# Patient Record
Sex: MALE | Race: BLACK OR AFRICAN AMERICAN | Employment: FULL TIME | URBAN - METROPOLITAN AREA
[De-identification: names, ages, dates, MRNs, and addresses within clinical notes are randomized per-mention and may not be internally consistent; named-entity substitution may affect disease eponyms.]

---

## 2017-05-10 ENCOUNTER — APPOINTMENT (OUTPATIENT)
Dept: GENERAL RADIOLOGY | Age: 52
End: 2017-05-10
Payer: OTHER GOVERNMENT

## 2017-05-10 ENCOUNTER — HOSPITAL ENCOUNTER (EMERGENCY)
Age: 52
Discharge: HOME | End: 2017-05-10
Attending: EMERGENCY MEDICINE
Payer: OTHER GOVERNMENT

## 2017-05-10 VITALS
WEIGHT: 242.44 LBS | TEMPERATURE: 98.9 F | RESPIRATION RATE: 16 BRPM | HEIGHT: 69 IN | SYSTOLIC BLOOD PRESSURE: 151 MMHG | HEART RATE: 89 BPM | DIASTOLIC BLOOD PRESSURE: 86 MMHG | OXYGEN SATURATION: 98 % | BODY MASS INDEX: 35.91 KG/M2

## 2017-05-10 DIAGNOSIS — M23.92 INTERNAL DERANGEMENT OF LEFT KNEE: Primary | ICD-10-CM

## 2017-05-10 LAB
ABSOLUTE EOS #: 0.2 K/UL (ref 0–0.4)
ABSOLUTE LYMPH #: 1.4 K/UL (ref 1–4.8)
ABSOLUTE MONO #: 0.5 K/UL (ref 0.2–0.8)
ANION GAP SERPL CALCULATED.3IONS-SCNC: 15 MMOL/L (ref 9–17)
BASOPHILS # BLD: 0 %
BASOPHILS ABSOLUTE: 0 K/UL (ref 0–0.2)
BUN BLDV-MCNC: 15 MG/DL (ref 6–20)
BUN/CREAT BLD: 12 (ref 9–20)
CALCIUM SERPL-MCNC: 8.9 MG/DL (ref 8.6–10.4)
CHLORIDE BLD-SCNC: 97 MMOL/L (ref 98–107)
CO2: 25 MMOL/L (ref 20–31)
CREAT SERPL-MCNC: 1.24 MG/DL (ref 0.7–1.2)
DIFFERENTIAL TYPE: ABNORMAL
EOSINOPHILS RELATIVE PERCENT: 2 %
GFR AFRICAN AMERICAN: >60 ML/MIN
GFR NON-AFRICAN AMERICAN: >60 ML/MIN
GFR SERPL CREATININE-BSD FRML MDRD: ABNORMAL ML/MIN/{1.73_M2}
GFR SERPL CREATININE-BSD FRML MDRD: ABNORMAL ML/MIN/{1.73_M2}
GLUCOSE BLD-MCNC: 170 MG/DL (ref 70–99)
HCT VFR BLD CALC: 37.6 % (ref 41–53)
HEMOGLOBIN: 12.5 G/DL (ref 13.5–17.5)
LYMPHOCYTES # BLD: 15 %
MCH RBC QN AUTO: 27.6 PG (ref 26–34)
MCHC RBC AUTO-ENTMCNC: 33.1 G/DL (ref 31–37)
MCV RBC AUTO: 83.5 FL (ref 80–100)
MONOCYTES # BLD: 6 %
PDW BLD-RTO: 14.1 % (ref 11.5–14.5)
PLATELET # BLD: 232 K/UL (ref 130–400)
PLATELET ESTIMATE: ABNORMAL
PMV BLD AUTO: ABNORMAL FL (ref 6–12)
POTASSIUM SERPL-SCNC: 4 MMOL/L (ref 3.7–5.3)
RBC # BLD: 4.51 M/UL (ref 4.5–5.9)
RBC # BLD: ABNORMAL 10*6/UL
SEG NEUTROPHILS: 77 %
SEGMENTED NEUTROPHILS ABSOLUTE COUNT: 6.8 K/UL (ref 1.8–7.7)
SODIUM BLD-SCNC: 137 MMOL/L (ref 135–144)
URIC ACID: 6.7 MG/DL (ref 3.4–7)
WBC # BLD: 8.9 K/UL (ref 3.5–11)
WBC # BLD: ABNORMAL 10*3/UL

## 2017-05-10 PROCEDURE — 6370000000 HC RX 637 (ALT 250 FOR IP): Performed by: EMERGENCY MEDICINE

## 2017-05-10 PROCEDURE — 73562 X-RAY EXAM OF KNEE 3: CPT

## 2017-05-10 PROCEDURE — 84550 ASSAY OF BLOOD/URIC ACID: CPT

## 2017-05-10 PROCEDURE — 85025 COMPLETE CBC W/AUTO DIFF WBC: CPT

## 2017-05-10 PROCEDURE — 99283 EMERGENCY DEPT VISIT LOW MDM: CPT

## 2017-05-10 PROCEDURE — 80048 BASIC METABOLIC PNL TOTAL CA: CPT

## 2017-05-10 RX ORDER — HYDROCODONE BITARTRATE AND ACETAMINOPHEN 5; 325 MG/1; MG/1
2 TABLET ORAL ONCE
Status: COMPLETED | OUTPATIENT
Start: 2017-05-10 | End: 2017-05-10

## 2017-05-10 RX ORDER — GLIMEPIRIDE 4 MG/1
8 TABLET ORAL
COMMUNITY

## 2017-05-10 RX ORDER — HYDROCODONE BITARTRATE AND ACETAMINOPHEN 5; 325 MG/1; MG/1
1 TABLET ORAL EVERY 4 HOURS PRN
Qty: 20 TABLET | Refills: 0 | Status: SHIPPED | OUTPATIENT
Start: 2017-05-10 | End: 2019-02-15

## 2017-05-10 RX ADMIN — HYDROCODONE BITARTRATE AND ACETAMINOPHEN 2 TABLET: 5; 325 TABLET ORAL at 20:50

## 2017-05-10 ASSESSMENT — PAIN DESCRIPTION - PAIN TYPE: TYPE: ACUTE PAIN

## 2017-05-10 ASSESSMENT — PAIN SCALES - WONG BAKER: WONGBAKER_NUMERICALRESPONSE: 6

## 2017-05-10 ASSESSMENT — PAIN SCALES - GENERAL
PAINLEVEL_OUTOF10: 10
PAINLEVEL_OUTOF10: 10

## 2017-05-10 ASSESSMENT — PAIN DESCRIPTION - DESCRIPTORS: DESCRIPTORS: THROBBING;CONSTANT

## 2017-05-10 ASSESSMENT — PAIN DESCRIPTION - FREQUENCY: FREQUENCY: CONTINUOUS

## 2017-05-10 ASSESSMENT — PAIN DESCRIPTION - ORIENTATION: ORIENTATION: LEFT

## 2017-05-10 ASSESSMENT — PAIN DESCRIPTION - LOCATION: LOCATION: KNEE

## 2017-12-12 ENCOUNTER — HOSPITAL ENCOUNTER (EMERGENCY)
Age: 52
Discharge: HOME | End: 2017-12-12
Attending: EMERGENCY MEDICINE
Payer: OTHER GOVERNMENT

## 2017-12-12 ENCOUNTER — APPOINTMENT (OUTPATIENT)
Dept: CT IMAGING | Age: 52
End: 2017-12-12
Payer: OTHER GOVERNMENT

## 2017-12-12 VITALS
TEMPERATURE: 98.4 F | WEIGHT: 232 LBS | HEART RATE: 77 BPM | HEIGHT: 69 IN | OXYGEN SATURATION: 95 % | RESPIRATION RATE: 16 BRPM | DIASTOLIC BLOOD PRESSURE: 66 MMHG | BODY MASS INDEX: 34.36 KG/M2 | SYSTOLIC BLOOD PRESSURE: 141 MMHG

## 2017-12-12 DIAGNOSIS — I10 ESSENTIAL HYPERTENSION: ICD-10-CM

## 2017-12-12 DIAGNOSIS — R51.9 NONINTRACTABLE HEADACHE, UNSPECIFIED CHRONICITY PATTERN, UNSPECIFIED HEADACHE TYPE: Primary | ICD-10-CM

## 2017-12-12 LAB
ABSOLUTE EOS #: 0.2 K/UL (ref 0–0.4)
ABSOLUTE IMMATURE GRANULOCYTE: ABNORMAL K/UL (ref 0–0.3)
ABSOLUTE LYMPH #: 1.8 K/UL (ref 1–4.8)
ABSOLUTE MONO #: 0.5 K/UL (ref 0.2–0.8)
AMPHETAMINE SCREEN URINE: NEGATIVE
ANION GAP SERPL CALCULATED.3IONS-SCNC: 13 MMOL/L (ref 9–17)
BARBITURATE SCREEN URINE: NEGATIVE
BASOPHILS # BLD: 1 % (ref 0–2)
BASOPHILS ABSOLUTE: 0 K/UL (ref 0–0.2)
BENZODIAZEPINE SCREEN, URINE: NEGATIVE
BUN BLDV-MCNC: 20 MG/DL (ref 6–20)
BUN/CREAT BLD: 15 (ref 9–20)
BUPRENORPHINE URINE: NORMAL
CALCIUM SERPL-MCNC: 8.9 MG/DL (ref 8.6–10.4)
CANNABINOID SCREEN URINE: NEGATIVE
CHLORIDE BLD-SCNC: 97 MMOL/L (ref 98–107)
CO2: 26 MMOL/L (ref 20–31)
COCAINE METABOLITE, URINE: NEGATIVE
CREAT SERPL-MCNC: 1.37 MG/DL (ref 0.7–1.2)
DIFFERENTIAL TYPE: ABNORMAL
EOSINOPHILS RELATIVE PERCENT: 2 % (ref 1–4)
GFR AFRICAN AMERICAN: >60 ML/MIN
GFR NON-AFRICAN AMERICAN: 55 ML/MIN
GFR SERPL CREATININE-BSD FRML MDRD: ABNORMAL ML/MIN/{1.73_M2}
GFR SERPL CREATININE-BSD FRML MDRD: ABNORMAL ML/MIN/{1.73_M2}
GLUCOSE BLD-MCNC: 171 MG/DL (ref 70–99)
HCT VFR BLD CALC: 36.4 % (ref 41–53)
HEMOGLOBIN: 12.3 G/DL (ref 13.5–17.5)
IMMATURE GRANULOCYTES: ABNORMAL %
LYMPHOCYTES # BLD: 21 % (ref 24–44)
MCH RBC QN AUTO: 28.7 PG (ref 26–34)
MCHC RBC AUTO-ENTMCNC: 33.8 G/DL (ref 31–37)
MCV RBC AUTO: 84.8 FL (ref 80–100)
MDMA URINE: NORMAL
METHADONE SCREEN, URINE: NEGATIVE
METHAMPHETAMINE, URINE: NORMAL
MONOCYTES # BLD: 6 % (ref 1–7)
MYOGLOBIN: 134 NG/ML (ref 28–72)
OPIATES, URINE: NEGATIVE
OXYCODONE SCREEN URINE: NEGATIVE
PDW BLD-RTO: 14.2 % (ref 11.5–14.5)
PHENCYCLIDINE, URINE: NEGATIVE
PLATELET # BLD: 243 K/UL (ref 130–400)
PLATELET ESTIMATE: ABNORMAL
PMV BLD AUTO: 7.5 FL (ref 6–12)
POTASSIUM SERPL-SCNC: 3.9 MMOL/L (ref 3.7–5.3)
PROPOXYPHENE, URINE: NORMAL
RBC # BLD: 4.29 M/UL (ref 4.5–5.9)
RBC # BLD: ABNORMAL 10*6/UL
SEG NEUTROPHILS: 70 % (ref 36–66)
SEGMENTED NEUTROPHILS ABSOLUTE COUNT: 5.8 K/UL (ref 1.8–7.7)
SODIUM BLD-SCNC: 136 MMOL/L (ref 135–144)
TEST INFORMATION: NORMAL
TRICYCLIC ANTIDEPRESSANTS, UR: NORMAL
TROPONIN INTERP: ABNORMAL
TROPONIN T: <0.03 NG/ML
WBC # BLD: 8.3 K/UL (ref 3.5–11)
WBC # BLD: ABNORMAL 10*3/UL

## 2017-12-12 PROCEDURE — 80307 DRUG TEST PRSMV CHEM ANLYZR: CPT

## 2017-12-12 PROCEDURE — 85025 COMPLETE CBC W/AUTO DIFF WBC: CPT

## 2017-12-12 PROCEDURE — 80048 BASIC METABOLIC PNL TOTAL CA: CPT

## 2017-12-12 PROCEDURE — 2580000003 HC RX 258: Performed by: NURSE PRACTITIONER

## 2017-12-12 PROCEDURE — 36415 COLL VENOUS BLD VENIPUNCTURE: CPT

## 2017-12-12 PROCEDURE — 6360000002 HC RX W HCPCS: Performed by: NURSE PRACTITIONER

## 2017-12-12 PROCEDURE — 84484 ASSAY OF TROPONIN QUANT: CPT

## 2017-12-12 PROCEDURE — 6360000004 HC RX CONTRAST MEDICATION: Performed by: NURSE PRACTITIONER

## 2017-12-12 PROCEDURE — 70496 CT ANGIOGRAPHY HEAD: CPT

## 2017-12-12 PROCEDURE — 96374 THER/PROPH/DIAG INJ IV PUSH: CPT

## 2017-12-12 PROCEDURE — 96375 TX/PRO/DX INJ NEW DRUG ADDON: CPT

## 2017-12-12 PROCEDURE — 99284 EMERGENCY DEPT VISIT MOD MDM: CPT

## 2017-12-12 PROCEDURE — 83874 ASSAY OF MYOGLOBIN: CPT

## 2017-12-12 PROCEDURE — 70496 CT ANGIOGRAPHY HEAD: CPT | Performed by: EMERGENCY MEDICINE

## 2017-12-12 RX ORDER — 0.9 % SODIUM CHLORIDE 0.9 %
50 INTRAVENOUS SOLUTION INTRAVENOUS ONCE
Status: COMPLETED | OUTPATIENT
Start: 2017-12-12 | End: 2017-12-12

## 2017-12-12 RX ORDER — ONDANSETRON 2 MG/ML
4 INJECTION INTRAMUSCULAR; INTRAVENOUS ONCE
Status: COMPLETED | OUTPATIENT
Start: 2017-12-12 | End: 2017-12-12

## 2017-12-12 RX ORDER — NALBUPHINE HCL 10 MG/ML
10 AMPUL (ML) INJECTION ONCE
Status: COMPLETED | OUTPATIENT
Start: 2017-12-12 | End: 2017-12-12

## 2017-12-12 RX ORDER — SODIUM CHLORIDE 0.9 % (FLUSH) 0.9 %
10 SYRINGE (ML) INJECTION PRN
Status: DISCONTINUED | OUTPATIENT
Start: 2017-12-12 | End: 2017-12-13 | Stop reason: HOSPADM

## 2017-12-12 RX ORDER — BUTALBITAL, ASPIRIN, AND CAFFEINE 50; 325; 40 MG/1; MG/1; MG/1
1 CAPSULE ORAL EVERY 6 HOURS PRN
Qty: 20 CAPSULE | Refills: 0 | Status: SHIPPED | OUTPATIENT
Start: 2017-12-12 | End: 2018-01-03

## 2017-12-12 RX ORDER — KETOROLAC TROMETHAMINE 30 MG/ML
30 INJECTION, SOLUTION INTRAMUSCULAR; INTRAVENOUS ONCE
Status: COMPLETED | OUTPATIENT
Start: 2017-12-12 | End: 2017-12-12

## 2017-12-12 RX ORDER — GLIPIZIDE 10 MG/1
10 TABLET ORAL DAILY
Status: ON HOLD | COMMUNITY
End: 2020-03-01

## 2017-12-12 RX ORDER — HYDRALAZINE HYDROCHLORIDE 20 MG/ML
10 INJECTION INTRAMUSCULAR; INTRAVENOUS ONCE
Status: COMPLETED | OUTPATIENT
Start: 2017-12-12 | End: 2017-12-12

## 2017-12-12 RX ADMIN — HYDRALAZINE HYDROCHLORIDE 10 MG: 20 INJECTION INTRAMUSCULAR; INTRAVENOUS at 20:01

## 2017-12-12 RX ADMIN — ONDANSETRON 4 MG: 2 INJECTION INTRAMUSCULAR; INTRAVENOUS at 20:14

## 2017-12-12 RX ADMIN — IOPAMIDOL 80 ML: 755 INJECTION, SOLUTION INTRAVENOUS at 19:32

## 2017-12-12 RX ADMIN — SODIUM CHLORIDE 50 ML: 9 INJECTION, SOLUTION INTRAVENOUS at 19:32

## 2017-12-12 RX ADMIN — NALBUPHINE HYDROCHLORIDE 10 MG: 10 INJECTION, SOLUTION INTRAMUSCULAR; INTRAVENOUS; SUBCUTANEOUS at 21:07

## 2017-12-12 RX ADMIN — KETOROLAC TROMETHAMINE 30 MG: 30 INJECTION, SOLUTION INTRAMUSCULAR at 20:14

## 2017-12-12 RX ADMIN — Medication 10 ML: at 19:32

## 2017-12-12 ASSESSMENT — PAIN DESCRIPTION - PAIN TYPE: TYPE: ACUTE PAIN

## 2017-12-12 ASSESSMENT — PAIN DESCRIPTION - LOCATION
LOCATION: HEAD
LOCATION: HEAD

## 2017-12-12 ASSESSMENT — PAIN SCALES - GENERAL
PAINLEVEL_OUTOF10: 8
PAINLEVEL_OUTOF10: 5

## 2017-12-12 ASSESSMENT — PAIN DESCRIPTION - DESCRIPTORS: DESCRIPTORS: ACHING;POUNDING

## 2017-12-13 ASSESSMENT — ENCOUNTER SYMPTOMS
RHINORRHEA: 0
ABDOMINAL PAIN: 0
SHORTNESS OF BREATH: 0
VOMITING: 0
COUGH: 0
WHEEZING: 0
DIARRHEA: 0
SINUS PRESSURE: 0
COLOR CHANGE: 0
NAUSEA: 0
CONSTIPATION: 0
SORE THROAT: 0

## 2017-12-13 NOTE — ED PROVIDER NOTES
Attending Supervisory Note/Shared Visit   I have personally performed a face to face diagnostic evaluation on this patient. I have reviewed the mid-levels findings and agree. History and Exam by me shows Vascular headache.   No signs of subarachnoid hemorrhage and was to follow-up with neurology    (Please note that portions of this note were completed with a voice recognition program.  Efforts were made to edit the dictations but occasionally words are mis-transcribed.)    Manan Hung MD  Attending Emergency Physician        Manan Hung MD  12/12/17 6101

## 2017-12-13 NOTE — ED PROVIDER NOTES
[facility] ED  eMERGENCY dEPARTMENT eNCOUnter      Pt Name: Candance Hakim  MRN: [id number]  [place] 1965  Date of evaluation: 12/12/2017  Provider: Beatriz Bonner NP, Simone 6626       Chief Complaint   Patient presents with    Headache     past 9 days / tx at [facility] ED 3 days ago         HISTORY OF PRESENT ILLNESS  (Location/Symptom, Timing/Onset, Context/Setting, Quality, Duration, Modifying Factors, Severity.)   Hayes Lawrence is a 47 y.o. male who presents to the emergency department Today by private vehicle for evaluation of a headache. Patient states that he has a throbbing generalized headache that he rates a 5 out of 0-10 scale. He states he feels that his head is going to explode. He also noted to have some high blood pressure. He does take 10 mg of lisinopril daily. He was seen at [facility] 3 days ago for the same complaint. They gave him some medications to bring his blood pressure down and help with his headache and then send him home. He states he still has the headache and high blood pressure. He attempted to get an appointment with his primary care physician at the [place] but he was unable to do so. He is experiencing any nausea or vomiting. Nursing Notes were reviewed. ALLERGIES     Review of patient's allergies indicates no known allergies.     CURRENT MEDICATIONS       Discharge Medication List as of 12/12/2017 10:20 PM      CONTINUE these medications which have NOT CHANGED    Details   glipiZIDE (GLUCOTROL) 10 MG tablet Take 10 mg by mouth dailyHistorical Med      glimepiride (AMARYL) 1 MG tablet Take 1 mg by mouth every morning (before breakfast)Historical Med      HYDROcodone-acetaminophen (NORCO) 5-325 MG per tablet Take 1 tablet by mouth every 4 hours as needed for Pain ., Disp-20 tablet, R-0Print      cephALEXin (KEFLEX) 500 MG capsule Take 1 capsule by mouth 3 times daily, Disp-21 capsule, R-0      ibuprofen (ADVIL;MOTRIN) 600 MG tablet Take 1 tablet by mouth every 8 hours as needed for Pain, Disp-30 tablet, R-0      nystatin (MYCOSTATIN) 571051 UNIT/GM powder Apply to left groin BID, Disp-1 Bottle, R-1, Print      insulin glargine (LANTUS) 100 UNIT/ML injection vial Inject 15 Units into the skin 2 times daily. PAST MEDICAL HISTORY         Diagnosis Date    Diabetes mellitus ([address]        SURGICAL HISTORY           Procedure Laterality Date    ROTATOR CUFF REPAIR      ROTATOR CUFF REPAIR Right          FAMILY HISTORY     History reviewed. No pertinent family history. No family status information on file. SOCIAL HISTORY      reports that he has never smoked. He does not have any smokeless tobacco history on file. He reports that he drinks alcohol. He reports that he does not use drugs. REVIEW OF SYSTEMS    (2-9 systems for level 4, 10 or more for level 5)     Review of Systems   Constitutional: Negative for chills, fever and unexpected weight change. HENT: Negative for congestion, rhinorrhea, sinus pressure and sore throat. Respiratory: Negative for cough, shortness of breath and wheezing. Cardiovascular: Negative for chest pain and palpitations. Gastrointestinal: Negative for abdominal pain, constipation, diarrhea, nausea and vomiting. Genitourinary: Negative for dysuria and hematuria. Musculoskeletal: Negative for arthralgias and myalgias. Skin: Negative for color change and rash. Neurological: Positive for headaches. Negative for dizziness and weakness. Hematological: Negative for adenopathy. Except as noted above the remainder of the review of systems was reviewed and negative. PHYSICAL EXAM    (up to 7 for level 4, 8 or more for level 5)     ED Triage Vitals [12/12/17 1730]   BP Temp Temp Source Pulse Resp SpO2 Height Weight   (!) 155/105 98.4 °F (36.9 °C) Oral 86 16 99 % 5' 9\" (1.753 m) 232 lb (105.2 kg)       Physical Exam   Constitutional: He is oriented to person, place, and time.  He appears well-developed and well-nourished. HENT:   Head: Normocephalic and atraumatic. Mouth/Throat: Oropharynx is clear and moist.   Eyes: Conjunctivae are normal. Pupils are equal, round, and reactive to light. Neck: Normal range of motion. Neck supple. Cardiovascular: Normal rate and regular rhythm. Pulmonary/Chest: Effort normal and breath sounds normal. No stridor. No respiratory distress. Abdominal: Soft. Bowel sounds are normal.   Musculoskeletal: Normal range of motion. Lymphadenopathy:     He has no cervical adenopathy. Neurological: He is alert and oriented to person, place, and time. Skin: Skin is warm and dry. No rash noted. Psychiatric: He has a normal mood and affect. Vitals reviewed. RADIOLOGY:   Non-plain film images such as CT, Ultrasound and MRI are read by the radiologist. Odalys Healy radiographic images are visualized and preliminarily interpreted by the emergency physician with the below findings:    Cta Head W Contrast    Result Date: 12/12/2017  EXAMINATION: CTA OF THE HEAD WITH CONTRAST  12/12/2017 7:38 pm: TECHNIQUE: CTA of the head/brain was performed with the administration of intravenous contrast. Multiplanar reformatted images are provided for review. MIP images are provided for review. Dose modulation, iterative reconstruction, and/or weight based adjustment of the mA/kV was utilized to reduce the radiation dose to as low as reasonably achievable. COMPARISON: Unenhanced head CT 04/17/2015 HISTORY: ORDERING SYSTEM PROVIDED HISTORY: headache FINDINGS: ANTERIOR CIRCULATION: The internal carotid arteries are normal in course and caliber without focal stenosis. The anterior cerebral and middle cerebral arteries demonstrate no focal stenosis. POSTERIOR CIRCULATION: The posterior cerebral arteries demonstrate no focal stenosis. The vertebral and basilar arteries appear unremarkable. ANEURYSM: No intracranial aneurysm is seen.  BRAIN: No abnormal intracranial enhancement or vascular malformation. Paranasal sinuses are well aerated. Normal structures are normal.  No acute osseous abnormality is seen. Presence of intravascular contrast precludes evaluation for small amounts of acute subarachnoid hemorrhage. Normal CTA of the head. Interpretation per the Radiologist below, if available at the time of this note:    CTA HEAD W CONTRAST   Final Result   Normal CTA of the head. LABS:  Labs Reviewed   CBC WITH AUTO DIFFERENTIAL - Abnormal; Notable for the following:        Result Value    RBC 4.29 (*)     Hemoglobin 12.3 (*)     Hematocrit 36.4 (*)     Seg Neutrophils 70 (*)     Lymphocytes 21 (*)     All other components within normal limits   BASIC METABOLIC PANEL - Abnormal; Notable for the following:     Glucose 171 (*)     CREATININE 1.37 (*)     Chloride 97 (*)     GFR Non- 55 (*)     All other components within normal limits   TROP/MYOGLOBIN - Abnormal; Notable for the following:     Myoglobin 134 (*)     All other components within normal limits   URINE DRUG SCREEN       All other labs were within normal range or not returned as of this dictation. EMERGENCY DEPARTMENT COURSE and DIFFERENTIAL DIAGNOSIS/MDM:   Vitals:    Vitals:    12/12/17 1730 12/12/17 1938 12/12/17 2055 12/12/17 2128   BP: (!) 155/105 (!) 176/90 (!) 147/69 (!) 141/66   Pulse: 86   77   Resp: 16   16   Temp: 98.4 °F (36.9 °C)      TempSrc: Oral      SpO2: 99%   95%   Weight: 232 lb (105.2 kg)      Height: 5' 9\" (1.753 m)          Medical Decision Making: Patient is given Toradol, Zofran, and Nubain. His headache has improved drastically. His blood pressure is now stable 147/69. He was told he needs a follow-up with neurology if these headaches persists as further outpatient testing such as MRI and other tests may be needed, return for worsening headache, fever, vomiting or any other concerns.   Medications   0.9 % sodium chloride bolus (0 mLs Intravenous Stopped 12/12/17 1933)   iopamidol (ISOVUE-370) 76 % injection 80 mL (80 mLs Intravenous Given 12/12/17 1932)   hydrALAZINE (APRESOLINE) injection 10 mg (10 mg Intravenous Given 12/12/17 2001)   ketorolac (TORADOL) injection 30 mg (30 mg Intravenous Given 12/12/17 2014)   ondansetron (ZOFRAN) injection 4 mg (4 mg Intravenous Given 12/12/17 2014)   nalbuphine (NUBAIN) injection 10 mg (10 mg Intravenous Given 12/12/17 2107)       FINAL IMPRESSION      1. Nonintractable headache, unspecified chronicity pattern, unspecified headache type    2.  Essential hypertension          DISPOSITION/PLAN   DISPOSITION Decision to Discharge    PATIENT REFERRED TO:   Sofia Smith MD  . [address]  [phone]    Call in 2 days        DISCHARGE MEDICATIONS:     Discharge Medication List as of 12/12/2017 10:20 PM      START taking these medications    Details   butalbital-aspirin-caffeine (FIORINAL) -40 MG per capsule Take 1 capsule by mouth every 6 hours as needed for Headaches ., Disp-20 capsule, R-0Print                 (Please note that portions of this note were completed with a voice recognition program.  Efforts were made to edit the dictations but occasionally words are mis-transcribed.)    Lynn Bingham NP, CNP  Certified Nurse Practitioner            Dillon Deleon, [address] [facility]  12/13/17 1696

## 2018-01-03 ENCOUNTER — HOSPITAL ENCOUNTER (EMERGENCY)
Age: 53
Discharge: HOME | End: 2018-01-03
Attending: EMERGENCY MEDICINE
Payer: OTHER GOVERNMENT

## 2018-01-03 VITALS
OXYGEN SATURATION: 98 % | DIASTOLIC BLOOD PRESSURE: 98 MMHG | SYSTOLIC BLOOD PRESSURE: 178 MMHG | BODY MASS INDEX: 34.36 KG/M2 | TEMPERATURE: 98.9 F | HEIGHT: 69 IN | RESPIRATION RATE: 18 BRPM | WEIGHT: 232 LBS | HEART RATE: 84 BPM

## 2018-01-03 DIAGNOSIS — R51.9 NONINTRACTABLE HEADACHE, UNSPECIFIED CHRONICITY PATTERN, UNSPECIFIED HEADACHE TYPE: ICD-10-CM

## 2018-01-03 DIAGNOSIS — I10 ESSENTIAL HYPERTENSION: Primary | ICD-10-CM

## 2018-01-03 LAB
ABSOLUTE EOS #: 0.2 K/UL (ref 0–0.4)
ABSOLUTE IMMATURE GRANULOCYTE: ABNORMAL K/UL (ref 0–0.3)
ABSOLUTE LYMPH #: 1.8 K/UL (ref 1–4.8)
ABSOLUTE MONO #: 0.5 K/UL (ref 0.2–0.8)
ANION GAP SERPL CALCULATED.3IONS-SCNC: 14 MMOL/L (ref 9–17)
BASOPHILS # BLD: 1 % (ref 0–2)
BASOPHILS ABSOLUTE: 0 K/UL (ref 0–0.2)
BUN BLDV-MCNC: 20 MG/DL (ref 6–20)
BUN/CREAT BLD: 14 (ref 9–20)
CALCIUM SERPL-MCNC: 8.9 MG/DL (ref 8.6–10.4)
CHLORIDE BLD-SCNC: 95 MMOL/L (ref 98–107)
CO2: 25 MMOL/L (ref 20–31)
CREAT SERPL-MCNC: 1.44 MG/DL (ref 0.7–1.2)
DIFFERENTIAL TYPE: ABNORMAL
EOSINOPHILS RELATIVE PERCENT: 3 % (ref 1–4)
GFR AFRICAN AMERICAN: >60 ML/MIN
GFR NON-AFRICAN AMERICAN: 52 ML/MIN
GFR SERPL CREATININE-BSD FRML MDRD: ABNORMAL ML/MIN/{1.73_M2}
GFR SERPL CREATININE-BSD FRML MDRD: ABNORMAL ML/MIN/{1.73_M2}
GLUCOSE BLD-MCNC: 188 MG/DL (ref 70–99)
HCT VFR BLD CALC: 37.3 % (ref 41–53)
HEMOGLOBIN: 12.8 G/DL (ref 13.5–17.5)
IMMATURE GRANULOCYTES: ABNORMAL %
LYMPHOCYTES # BLD: 21 % (ref 24–44)
MCH RBC QN AUTO: 29.2 PG (ref 26–34)
MCHC RBC AUTO-ENTMCNC: 34.3 G/DL (ref 31–37)
MCV RBC AUTO: 85.1 FL (ref 80–100)
MONOCYTES # BLD: 6 % (ref 1–7)
PDW BLD-RTO: 14.4 % (ref 11.5–14.5)
PLATELET # BLD: 249 K/UL (ref 130–400)
PLATELET ESTIMATE: ABNORMAL
PMV BLD AUTO: 7.1 FL (ref 6–12)
POTASSIUM SERPL-SCNC: 4 MMOL/L (ref 3.7–5.3)
RBC # BLD: 4.38 M/UL (ref 4.5–5.9)
RBC # BLD: ABNORMAL 10*6/UL
SEG NEUTROPHILS: 69 % (ref 36–66)
SEGMENTED NEUTROPHILS ABSOLUTE COUNT: 5.8 K/UL (ref 1.8–7.7)
SODIUM BLD-SCNC: 134 MMOL/L (ref 135–144)
WBC # BLD: 8.4 K/UL (ref 3.5–11)
WBC # BLD: ABNORMAL 10*3/UL

## 2018-01-03 PROCEDURE — 99284 EMERGENCY DEPT VISIT MOD MDM: CPT

## 2018-01-03 PROCEDURE — 96374 THER/PROPH/DIAG INJ IV PUSH: CPT

## 2018-01-03 PROCEDURE — 2580000003 HC RX 258: Performed by: NURSE PRACTITIONER

## 2018-01-03 PROCEDURE — 85025 COMPLETE CBC W/AUTO DIFF WBC: CPT

## 2018-01-03 PROCEDURE — 96375 TX/PRO/DX INJ NEW DRUG ADDON: CPT

## 2018-01-03 PROCEDURE — 6360000002 HC RX W HCPCS: Performed by: NURSE PRACTITIONER

## 2018-01-03 PROCEDURE — 80048 BASIC METABOLIC PNL TOTAL CA: CPT

## 2018-01-03 RX ORDER — 0.9 % SODIUM CHLORIDE 0.9 %
1000 INTRAVENOUS SOLUTION INTRAVENOUS ONCE
Status: COMPLETED | OUTPATIENT
Start: 2018-01-03 | End: 2018-01-03

## 2018-01-03 RX ORDER — MORPHINE SULFATE 4 MG/ML
4 INJECTION, SOLUTION INTRAMUSCULAR; INTRAVENOUS ONCE
Status: COMPLETED | OUTPATIENT
Start: 2018-01-03 | End: 2018-01-03

## 2018-01-03 RX ORDER — KETOROLAC TROMETHAMINE 30 MG/ML
30 INJECTION, SOLUTION INTRAMUSCULAR; INTRAVENOUS ONCE
Status: COMPLETED | OUTPATIENT
Start: 2018-01-03 | End: 2018-01-03

## 2018-01-03 RX ORDER — ONDANSETRON 2 MG/ML
4 INJECTION INTRAMUSCULAR; INTRAVENOUS ONCE
Status: COMPLETED | OUTPATIENT
Start: 2018-01-03 | End: 2018-01-03

## 2018-01-03 RX ORDER — HYDRALAZINE HYDROCHLORIDE 20 MG/ML
10 INJECTION INTRAMUSCULAR; INTRAVENOUS ONCE
Status: COMPLETED | OUTPATIENT
Start: 2018-01-03 | End: 2018-01-03

## 2018-01-03 RX ORDER — DIPHENHYDRAMINE HYDROCHLORIDE 50 MG/ML
12.5 INJECTION INTRAMUSCULAR; INTRAVENOUS
Status: DISCONTINUED | OUTPATIENT
Start: 2018-01-03 | End: 2018-01-03 | Stop reason: HOSPADM

## 2018-01-03 RX ORDER — BUTALBITAL, ASPIRIN, AND CAFFEINE 50; 325; 40 MG/1; MG/1; MG/1
1 CAPSULE ORAL EVERY 6 HOURS PRN
Qty: 20 CAPSULE | Refills: 0 | Status: SHIPPED | OUTPATIENT
Start: 2018-01-03 | End: 2019-02-15

## 2018-01-03 RX ORDER — LISINOPRIL 20 MG/1
20 TABLET ORAL DAILY
Qty: 30 TABLET | Refills: 0 | Status: ON HOLD | OUTPATIENT
Start: 2018-01-03 | End: 2020-03-04

## 2018-01-03 RX ADMIN — MORPHINE SULFATE 4 MG: 4 INJECTION INTRAVENOUS at 19:54

## 2018-01-03 RX ADMIN — KETOROLAC TROMETHAMINE 30 MG: 30 INJECTION, SOLUTION INTRAMUSCULAR at 19:09

## 2018-01-03 RX ADMIN — SODIUM CHLORIDE 1000 ML: 9 INJECTION, SOLUTION INTRAVENOUS at 19:09

## 2018-01-03 RX ADMIN — ONDANSETRON 4 MG: 2 INJECTION INTRAMUSCULAR; INTRAVENOUS at 19:08

## 2018-01-03 RX ADMIN — HYDRALAZINE HYDROCHLORIDE 10 MG: 20 INJECTION INTRAMUSCULAR; INTRAVENOUS at 19:52

## 2018-01-03 ASSESSMENT — ENCOUNTER SYMPTOMS
DIARRHEA: 0
NAUSEA: 0
CONSTIPATION: 0
RHINORRHEA: 0
SHORTNESS OF BREATH: 0
SORE THROAT: 0
SINUS PRESSURE: 0
WHEEZING: 0
COLOR CHANGE: 0
ABDOMINAL PAIN: 0
VOMITING: 0
COUGH: 0

## 2018-01-03 ASSESSMENT — PAIN DESCRIPTION - DESCRIPTORS
DESCRIPTORS: ACHING;POUNDING;PRESSURE
DESCRIPTORS: DISCOMFORT

## 2018-01-03 ASSESSMENT — PAIN SCALES - GENERAL
PAINLEVEL_OUTOF10: 10
PAINLEVEL_OUTOF10: 5
PAINLEVEL_OUTOF10: 10
PAINLEVEL_OUTOF10: 10

## 2018-01-03 ASSESSMENT — PAIN DESCRIPTION - LOCATION
LOCATION: HEAD
LOCATION: HEAD

## 2018-01-03 ASSESSMENT — PAIN DESCRIPTION - PROGRESSION: CLINICAL_PROGRESSION: GRADUALLY IMPROVING

## 2018-01-03 ASSESSMENT — PAIN DESCRIPTION - FREQUENCY
FREQUENCY: CONTINUOUS
FREQUENCY: INTERMITTENT

## 2018-01-03 ASSESSMENT — PAIN SCALES - WONG BAKER: WONGBAKER_NUMERICALRESPONSE: 0

## 2018-01-03 ASSESSMENT — PAIN DESCRIPTION - ORIENTATION: ORIENTATION: UPPER;ANTERIOR

## 2018-01-03 ASSESSMENT — PAIN DESCRIPTION - PAIN TYPE: TYPE: ACUTE PAIN

## 2018-01-04 NOTE — ED PROVIDER NOTES
tablet Take 1 tablet by mouth every 8 hours as needed for Pain, Disp-30 tablet, R-0      nystatin (MYCOSTATIN) 975936 UNIT/GM powder Apply to left groin BID, Disp-1 Bottle, R-1, Print      insulin glargine (LANTUS) 100 UNIT/ML injection vial Inject 15 Units into the skin 2 times daily. PAST MEDICAL HISTORY         Diagnosis Date    Diabetes mellitus ([facility] [address]        SURGICAL HISTORY           Procedure Laterality Date    ROTATOR CUFF REPAIR      ROTATOR CUFF REPAIR Right          FAMILY HISTORY     History reviewed. No pertinent family history. No family status information on file. SOCIAL HISTORY      reports that he has never smoked. He has never used smokeless tobacco. He reports that he drinks alcohol. He reports that he does not use drugs. REVIEW OF SYSTEMS    (2-9 systems for level 4, 10 or more for level 5)     Review of Systems   Constitutional: Negative for chills, fever and unexpected weight change. HENT: Negative for congestion, rhinorrhea, sinus pressure and sore throat. Respiratory: Negative for cough, shortness of breath and wheezing. Cardiovascular: Negative for chest pain and palpitations. Gastrointestinal: Negative for abdominal pain, constipation, diarrhea, nausea and vomiting. Genitourinary: Negative for dysuria and hematuria. Musculoskeletal: Negative for arthralgias and myalgias. Skin: Negative for color change and rash. Neurological: Positive for headaches. Negative for dizziness and weakness. Hematological: Negative for adenopathy. Except as noted above the remainder of the review of systems was reviewed and negative. PHYSICAL EXAM    (up to 7 for level 4, 8 or more for level 5)     ED Triage Vitals [01/03/18 1839]   BP Temp Temp Source Pulse Resp SpO2 Height Weight   (!) 182/102 98.9 °F (37.2 °C) Temporal 84 18 98 % 5' 9\" (1.753 m) 232 lb (105.2 kg)       Physical Exam   Constitutional: He is oriented to person, place, and time.  He appears well-developed and well-nourished. HENT:   Head: Normocephalic and atraumatic. Mouth/Throat: Oropharynx is clear and moist.   Eyes: Conjunctivae are normal. Pupils are equal, round, and reactive to light. Neck: Normal range of motion. Neck supple. Cardiovascular: Normal rate and regular rhythm. Pulmonary/Chest: Effort normal and breath sounds normal. No stridor. No respiratory distress. Abdominal: Soft. Bowel sounds are normal.   Musculoskeletal: Normal range of motion. Lymphadenopathy:     He has no cervical adenopathy. Neurological: He is alert and oriented to person, place, and time. Skin: Skin is warm and dry. No rash noted. Psychiatric: He has a normal mood and affect. Vitals reviewed. LABS:  Labs Reviewed   CBC WITH AUTO DIFFERENTIAL - Abnormal; Notable for the following:        Result Value    RBC 4.38 (*)     Hemoglobin 12.8 (*)     Hematocrit 37.3 (*)     Seg Neutrophils 69 (*)     Lymphocytes 21 (*)     All other components within normal limits   BASIC METABOLIC PANEL - Abnormal; Notable for the following:     Glucose 188 (*)     CREATININE 1.44 (*)     Sodium 134 (*)     Chloride 95 (*)     GFR Non- 52 (*)     All other components within normal limits       All other labs were within normal range or not returned as of this dictation. EMERGENCY DEPARTMENT COURSE and DIFFERENTIAL DIAGNOSIS/MDM:   Vitals:    Vitals:    01/03/18 1839 01/03/18 1952   BP: (!) 182/102 (!) 178/98   Pulse: 84    Resp: 18    Temp: 98.9 °F (37.2 °C)    TempSrc: Temporal    SpO2: 98%    Weight: 232 lb (105.2 kg)    Height: 5' 9\" (1.753 m)        Medical Decision Making: Patient was given Toradol and Zofran but continued to have a headache. He was given hydralazine and morphine. His headache has improved down to he'll be discharged home on a prescription for lisinopril 20 mg daily in addition to a refill of Fiorinal.  [facility].   Medications   0.9

## 2018-06-25 ENCOUNTER — HOSPITAL ENCOUNTER (EMERGENCY)
Age: 53
Discharge: HOME | End: 2018-06-25
Attending: EMERGENCY MEDICINE
Payer: OTHER GOVERNMENT

## 2018-06-25 ENCOUNTER — APPOINTMENT (OUTPATIENT)
Dept: CT IMAGING | Age: 53
End: 2018-06-25
Payer: OTHER GOVERNMENT

## 2018-06-25 VITALS
SYSTOLIC BLOOD PRESSURE: 168 MMHG | HEART RATE: 66 BPM | TEMPERATURE: 98.2 F | HEIGHT: 69 IN | RESPIRATION RATE: 16 BRPM | WEIGHT: 247.5 LBS | BODY MASS INDEX: 36.66 KG/M2 | DIASTOLIC BLOOD PRESSURE: 83 MMHG | OXYGEN SATURATION: 100 %

## 2018-06-25 DIAGNOSIS — E86.0 DEHYDRATION: ICD-10-CM

## 2018-06-25 DIAGNOSIS — I10 ESSENTIAL HYPERTENSION: ICD-10-CM

## 2018-06-25 DIAGNOSIS — R51.9 NONINTRACTABLE EPISODIC HEADACHE, UNSPECIFIED HEADACHE TYPE: Primary | ICD-10-CM

## 2018-06-25 LAB
ABSOLUTE EOS #: 0.2 K/UL (ref 0–0.4)
ABSOLUTE IMMATURE GRANULOCYTE: ABNORMAL K/UL (ref 0–0.3)
ABSOLUTE LYMPH #: 1.2 K/UL (ref 1–4.8)
ABSOLUTE MONO #: 0.5 K/UL (ref 0.2–0.8)
ANION GAP SERPL CALCULATED.3IONS-SCNC: 12 MMOL/L (ref 9–17)
BASOPHILS # BLD: 0 % (ref 0–2)
BASOPHILS ABSOLUTE: 0 K/UL (ref 0–0.2)
BUN BLDV-MCNC: 21 MG/DL (ref 6–20)
BUN/CREAT BLD: 15 (ref 9–20)
CALCIUM SERPL-MCNC: 9.5 MG/DL (ref 8.6–10.4)
CHLORIDE BLD-SCNC: 99 MMOL/L (ref 98–107)
CO2: 26 MMOL/L (ref 20–31)
CREAT SERPL-MCNC: 1.41 MG/DL (ref 0.7–1.2)
DIFFERENTIAL TYPE: ABNORMAL
EOSINOPHILS RELATIVE PERCENT: 2 % (ref 1–4)
GFR AFRICAN AMERICAN: >60 ML/MIN
GFR NON-AFRICAN AMERICAN: 53 ML/MIN
GFR SERPL CREATININE-BSD FRML MDRD: ABNORMAL ML/MIN/{1.73_M2}
GFR SERPL CREATININE-BSD FRML MDRD: ABNORMAL ML/MIN/{1.73_M2}
GLUCOSE BLD-MCNC: 144 MG/DL (ref 70–99)
HCT VFR BLD CALC: 37.6 % (ref 41–53)
HEMOGLOBIN: 12.6 G/DL (ref 13.5–17.5)
IMMATURE GRANULOCYTES: ABNORMAL %
LYMPHOCYTES # BLD: 13 % (ref 24–44)
MCH RBC QN AUTO: 28.6 PG (ref 26–34)
MCHC RBC AUTO-ENTMCNC: 33.6 G/DL (ref 31–37)
MCV RBC AUTO: 85.2 FL (ref 80–100)
MONOCYTES # BLD: 6 % (ref 1–7)
NRBC AUTOMATED: ABNORMAL PER 100 WBC
PDW BLD-RTO: 14.4 % (ref 11.5–14.5)
PLATELET # BLD: 238 K/UL (ref 130–400)
PLATELET ESTIMATE: ABNORMAL
PMV BLD AUTO: 6.8 FL (ref 6–12)
POTASSIUM SERPL-SCNC: 4.4 MMOL/L (ref 3.7–5.3)
RBC # BLD: 4.42 M/UL (ref 4.5–5.9)
RBC # BLD: ABNORMAL 10*6/UL
SEG NEUTROPHILS: 79 % (ref 36–66)
SEGMENTED NEUTROPHILS ABSOLUTE COUNT: 6.9 K/UL (ref 1.8–7.7)
SODIUM BLD-SCNC: 137 MMOL/L (ref 135–144)
WBC # BLD: 8.8 K/UL (ref 3.5–11)
WBC # BLD: ABNORMAL 10*3/UL

## 2018-06-25 PROCEDURE — 96375 TX/PRO/DX INJ NEW DRUG ADDON: CPT

## 2018-06-25 PROCEDURE — 96376 TX/PRO/DX INJ SAME DRUG ADON: CPT

## 2018-06-25 PROCEDURE — 2580000003 HC RX 258: Performed by: NURSE PRACTITIONER

## 2018-06-25 PROCEDURE — 96361 HYDRATE IV INFUSION ADD-ON: CPT

## 2018-06-25 PROCEDURE — 96374 THER/PROPH/DIAG INJ IV PUSH: CPT

## 2018-06-25 PROCEDURE — 70450 CT HEAD/BRAIN W/O DYE: CPT

## 2018-06-25 PROCEDURE — 80048 BASIC METABOLIC PNL TOTAL CA: CPT

## 2018-06-25 PROCEDURE — 85025 COMPLETE CBC W/AUTO DIFF WBC: CPT

## 2018-06-25 PROCEDURE — 6360000002 HC RX W HCPCS: Performed by: NURSE PRACTITIONER

## 2018-06-25 PROCEDURE — 99284 EMERGENCY DEPT VISIT MOD MDM: CPT

## 2018-06-25 RX ORDER — DIPHENHYDRAMINE HYDROCHLORIDE 50 MG/ML
25 INJECTION INTRAMUSCULAR; INTRAVENOUS ONCE
Status: COMPLETED | OUTPATIENT
Start: 2018-06-25 | End: 2018-06-25

## 2018-06-25 RX ORDER — TRAMADOL HYDROCHLORIDE 50 MG/1
50 TABLET ORAL EVERY 8 HOURS PRN
Qty: 12 TABLET | Refills: 0 | Status: SHIPPED | OUTPATIENT
Start: 2018-06-25 | End: 2018-06-29

## 2018-06-25 RX ORDER — HYDRALAZINE HYDROCHLORIDE 20 MG/ML
10 INJECTION INTRAMUSCULAR; INTRAVENOUS ONCE
Status: COMPLETED | OUTPATIENT
Start: 2018-06-25 | End: 2018-06-25

## 2018-06-25 RX ORDER — 0.9 % SODIUM CHLORIDE 0.9 %
1000 INTRAVENOUS SOLUTION INTRAVENOUS ONCE
Status: COMPLETED | OUTPATIENT
Start: 2018-06-25 | End: 2018-06-25

## 2018-06-25 RX ORDER — DEXAMETHASONE SODIUM PHOSPHATE 10 MG/ML
10 INJECTION INTRAMUSCULAR; INTRAVENOUS ONCE
Status: COMPLETED | OUTPATIENT
Start: 2018-06-25 | End: 2018-06-25

## 2018-06-25 RX ORDER — ONDANSETRON 2 MG/ML
4 INJECTION INTRAMUSCULAR; INTRAVENOUS ONCE
Status: COMPLETED | OUTPATIENT
Start: 2018-06-25 | End: 2018-06-25

## 2018-06-25 RX ADMIN — HYDRALAZINE HYDROCHLORIDE 10 MG: 20 INJECTION INTRAMUSCULAR; INTRAVENOUS at 22:43

## 2018-06-25 RX ADMIN — SODIUM CHLORIDE 1000 ML: 9 INJECTION, SOLUTION INTRAVENOUS at 21:43

## 2018-06-25 RX ADMIN — HYDROMORPHONE HYDROCHLORIDE 1 MG: 1 INJECTION, SOLUTION INTRAMUSCULAR; INTRAVENOUS; SUBCUTANEOUS at 22:43

## 2018-06-25 RX ADMIN — DIPHENHYDRAMINE HYDROCHLORIDE 25 MG: 50 INJECTION INTRAMUSCULAR; INTRAVENOUS at 21:44

## 2018-06-25 RX ADMIN — DEXAMETHASONE SODIUM PHOSPHATE 10 MG: 10 INJECTION INTRAMUSCULAR; INTRAVENOUS at 21:43

## 2018-06-25 RX ADMIN — ONDANSETRON 4 MG: 2 INJECTION INTRAMUSCULAR; INTRAVENOUS at 21:43

## 2018-06-25 RX ADMIN — HYDRALAZINE HYDROCHLORIDE 10 MG: 20 INJECTION INTRAMUSCULAR; INTRAVENOUS at 21:43

## 2018-06-25 ASSESSMENT — PAIN DESCRIPTION - LOCATION: LOCATION: HEAD

## 2018-06-25 ASSESSMENT — PAIN DESCRIPTION - PAIN TYPE
TYPE: ACUTE PAIN
TYPE: ACUTE PAIN

## 2018-06-25 ASSESSMENT — PAIN SCALES - GENERAL
PAINLEVEL_OUTOF10: 10
PAINLEVEL_OUTOF10: 8
PAINLEVEL_OUTOF10: 10

## 2018-06-25 ASSESSMENT — PAIN DESCRIPTION - FREQUENCY: FREQUENCY: CONTINUOUS

## 2018-06-25 ASSESSMENT — PAIN DESCRIPTION - DESCRIPTORS: DESCRIPTORS: HEADACHE

## 2019-02-15 ENCOUNTER — HOSPITAL ENCOUNTER (EMERGENCY)
Age: 54
Discharge: HOME | End: 2019-02-16
Attending: EMERGENCY MEDICINE
Payer: COMMERCIAL

## 2019-02-15 VITALS
SYSTOLIC BLOOD PRESSURE: 165 MMHG | OXYGEN SATURATION: 98 % | HEART RATE: 67 BPM | BODY MASS INDEX: 34.86 KG/M2 | TEMPERATURE: 97.7 F | RESPIRATION RATE: 19 BRPM | HEIGHT: 69 IN | WEIGHT: 235.38 LBS | DIASTOLIC BLOOD PRESSURE: 81 MMHG

## 2019-02-15 DIAGNOSIS — R51.9 NONINTRACTABLE HEADACHE, UNSPECIFIED CHRONICITY PATTERN, UNSPECIFIED HEADACHE TYPE: Primary | ICD-10-CM

## 2019-02-15 PROCEDURE — 2580000003 HC RX 258: Performed by: EMERGENCY MEDICINE

## 2019-02-15 PROCEDURE — 96375 TX/PRO/DX INJ NEW DRUG ADDON: CPT

## 2019-02-15 PROCEDURE — 99283 EMERGENCY DEPT VISIT LOW MDM: CPT

## 2019-02-15 PROCEDURE — 96361 HYDRATE IV INFUSION ADD-ON: CPT

## 2019-02-15 PROCEDURE — 96374 THER/PROPH/DIAG INJ IV PUSH: CPT

## 2019-02-15 PROCEDURE — 6360000002 HC RX W HCPCS: Performed by: EMERGENCY MEDICINE

## 2019-02-15 RX ORDER — 0.9 % SODIUM CHLORIDE 0.9 %
1000 INTRAVENOUS SOLUTION INTRAVENOUS ONCE
Status: COMPLETED | OUTPATIENT
Start: 2019-02-15 | End: 2019-02-16

## 2019-02-15 RX ORDER — KETOROLAC TROMETHAMINE 15 MG/ML
15 INJECTION, SOLUTION INTRAMUSCULAR; INTRAVENOUS ONCE
Status: COMPLETED | OUTPATIENT
Start: 2019-02-15 | End: 2019-02-15

## 2019-02-15 RX ORDER — DEXAMETHASONE SODIUM PHOSPHATE 10 MG/ML
10 INJECTION, SOLUTION INTRAMUSCULAR; INTRAVENOUS ONCE
Status: COMPLETED | OUTPATIENT
Start: 2019-02-15 | End: 2019-02-15

## 2019-02-15 RX ORDER — SUMATRIPTAN 25 MG/1
25 TABLET, FILM COATED ORAL
Status: ON HOLD | COMMUNITY
End: 2020-03-02

## 2019-02-15 RX ORDER — DIPHENHYDRAMINE HYDROCHLORIDE 50 MG/ML
25 INJECTION INTRAMUSCULAR; INTRAVENOUS ONCE
Status: COMPLETED | OUTPATIENT
Start: 2019-02-15 | End: 2019-02-15

## 2019-02-15 RX ADMIN — SODIUM CHLORIDE 1000 ML: 9 INJECTION, SOLUTION INTRAVENOUS at 23:24

## 2019-02-15 RX ADMIN — DEXAMETHASONE SODIUM PHOSPHATE 10 MG: 10 INJECTION, SOLUTION INTRAMUSCULAR; INTRAVENOUS at 23:27

## 2019-02-15 RX ADMIN — KETOROLAC TROMETHAMINE 15 MG: 15 INJECTION, SOLUTION INTRAMUSCULAR; INTRAVENOUS at 23:32

## 2019-02-15 RX ADMIN — PROCHLORPERAZINE EDISYLATE 10 MG: 5 INJECTION INTRAMUSCULAR; INTRAVENOUS at 23:29

## 2019-02-15 RX ADMIN — DIPHENHYDRAMINE HYDROCHLORIDE 25 MG: 50 INJECTION, SOLUTION INTRAMUSCULAR; INTRAVENOUS at 23:25

## 2019-02-15 ASSESSMENT — PAIN SCALES - GENERAL
PAINLEVEL_OUTOF10: 7
PAINLEVEL_OUTOF10: 7

## 2019-02-15 ASSESSMENT — PAIN DESCRIPTION - LOCATION: LOCATION: HEAD;NECK

## 2019-02-16 ASSESSMENT — PAIN SCALES - GENERAL: PAINLEVEL_OUTOF10: 4

## 2019-02-17 ENCOUNTER — APPOINTMENT (OUTPATIENT)
Dept: CT IMAGING | Age: 54
End: 2019-02-17
Payer: COMMERCIAL

## 2019-02-17 ENCOUNTER — HOSPITAL ENCOUNTER (EMERGENCY)
Age: 54
Discharge: HOME | End: 2019-02-17
Attending: EMERGENCY MEDICINE
Payer: COMMERCIAL

## 2019-02-17 VITALS
TEMPERATURE: 97.6 F | RESPIRATION RATE: 16 BRPM | HEART RATE: 94 BPM | DIASTOLIC BLOOD PRESSURE: 98 MMHG | BODY MASS INDEX: 35.62 KG/M2 | SYSTOLIC BLOOD PRESSURE: 165 MMHG | WEIGHT: 240.5 LBS | OXYGEN SATURATION: 99 % | HEIGHT: 69 IN

## 2019-02-17 DIAGNOSIS — R51.9 NONINTRACTABLE HEADACHE, UNSPECIFIED CHRONICITY PATTERN, UNSPECIFIED HEADACHE TYPE: ICD-10-CM

## 2019-02-17 DIAGNOSIS — R11.2 INTRACTABLE VOMITING WITH NAUSEA, UNSPECIFIED VOMITING TYPE: Primary | ICD-10-CM

## 2019-02-17 DIAGNOSIS — G43.809 OTHER MIGRAINE WITHOUT STATUS MIGRAINOSUS, NOT INTRACTABLE: ICD-10-CM

## 2019-02-17 LAB
-: ABNORMAL
ABSOLUTE EOS #: 0.1 K/UL (ref 0–0.4)
ABSOLUTE IMMATURE GRANULOCYTE: ABNORMAL K/UL (ref 0–0.3)
ABSOLUTE LYMPH #: 1.9 K/UL (ref 1–4.8)
ABSOLUTE MONO #: 0.4 K/UL (ref 0.2–0.8)
AMORPHOUS: ABNORMAL
ANION GAP SERPL CALCULATED.3IONS-SCNC: 14 MMOL/L (ref 9–17)
BACTERIA: ABNORMAL
BASOPHILS # BLD: 1 % (ref 0–2)
BASOPHILS ABSOLUTE: 0.1 K/UL (ref 0–0.2)
BILIRUBIN URINE: NEGATIVE
BUN BLDV-MCNC: 23 MG/DL (ref 6–20)
BUN/CREAT BLD: 16 (ref 9–20)
CALCIUM SERPL-MCNC: 9.1 MG/DL (ref 8.6–10.4)
CASTS UA: ABNORMAL /LPF
CASTS UA: ABNORMAL /LPF
CHLORIDE BLD-SCNC: 97 MMOL/L (ref 98–107)
CO2: 24 MMOL/L (ref 20–31)
COLOR: ABNORMAL
COMMENT UA: ABNORMAL
CREAT SERPL-MCNC: 1.46 MG/DL (ref 0.7–1.2)
CRYSTALS, UA: ABNORMAL /HPF
DIFFERENTIAL TYPE: ABNORMAL
EOSINOPHILS RELATIVE PERCENT: 2 % (ref 1–4)
EPITHELIAL CELLS UA: ABNORMAL /HPF (ref 0–5)
GFR AFRICAN AMERICAN: >60 ML/MIN
GFR NON-AFRICAN AMERICAN: 51 ML/MIN
GFR SERPL CREATININE-BSD FRML MDRD: ABNORMAL ML/MIN/{1.73_M2}
GFR SERPL CREATININE-BSD FRML MDRD: ABNORMAL ML/MIN/{1.73_M2}
GLUCOSE BLD-MCNC: 304 MG/DL (ref 70–99)
GLUCOSE URINE: ABNORMAL
HCT VFR BLD CALC: 39.3 % (ref 41–53)
HEMOGLOBIN: 13.2 G/DL (ref 13.5–17.5)
IMMATURE GRANULOCYTES: ABNORMAL %
KETONES, URINE: NEGATIVE
LEUKOCYTE ESTERASE, URINE: NEGATIVE
LYMPHOCYTES # BLD: 25 % (ref 24–44)
MCH RBC QN AUTO: 28.1 PG (ref 26–34)
MCHC RBC AUTO-ENTMCNC: 33.6 G/DL (ref 31–37)
MCV RBC AUTO: 83.6 FL (ref 80–100)
MONOCYTES # BLD: 5 % (ref 1–7)
MUCUS: ABNORMAL
NITRITE, URINE: NEGATIVE
NRBC AUTOMATED: ABNORMAL PER 100 WBC
OTHER OBSERVATIONS UA: ABNORMAL
PDW BLD-RTO: 13.8 % (ref 11.5–14.5)
PH UA: 6 (ref 5–8)
PLATELET # BLD: 278 K/UL (ref 130–400)
PLATELET ESTIMATE: ABNORMAL
PMV BLD AUTO: ABNORMAL FL (ref 6–12)
POTASSIUM SERPL-SCNC: 3.9 MMOL/L (ref 3.7–5.3)
PROTEIN UA: ABNORMAL
RBC # BLD: 4.7 M/UL (ref 4.5–5.9)
RBC # BLD: ABNORMAL 10*6/UL
RBC UA: ABNORMAL /HPF (ref 0–2)
RENAL EPITHELIAL, UA: ABNORMAL /HPF
SEG NEUTROPHILS: 67 % (ref 36–66)
SEGMENTED NEUTROPHILS ABSOLUTE COUNT: 5.3 K/UL (ref 1.8–7.7)
SODIUM BLD-SCNC: 135 MMOL/L (ref 135–144)
SPECIFIC GRAVITY UA: 1.02 (ref 1–1.03)
TRICHOMONAS: ABNORMAL
TURBIDITY: CLEAR
URINE HGB: ABNORMAL
UROBILINOGEN, URINE: NORMAL
WBC # BLD: 7.8 K/UL (ref 3.5–11)
WBC # BLD: ABNORMAL 10*3/UL
WBC UA: ABNORMAL /HPF (ref 0–5)
YEAST: ABNORMAL

## 2019-02-17 PROCEDURE — 85025 COMPLETE CBC W/AUTO DIFF WBC: CPT

## 2019-02-17 PROCEDURE — 70450 CT HEAD/BRAIN W/O DYE: CPT

## 2019-02-17 PROCEDURE — 6360000002 HC RX W HCPCS: Performed by: NURSE PRACTITIONER

## 2019-02-17 PROCEDURE — 96375 TX/PRO/DX INJ NEW DRUG ADDON: CPT

## 2019-02-17 PROCEDURE — 6370000000 HC RX 637 (ALT 250 FOR IP): Performed by: NURSE PRACTITIONER

## 2019-02-17 PROCEDURE — 2580000003 HC RX 258: Performed by: NURSE PRACTITIONER

## 2019-02-17 PROCEDURE — 96374 THER/PROPH/DIAG INJ IV PUSH: CPT

## 2019-02-17 PROCEDURE — 99283 EMERGENCY DEPT VISIT LOW MDM: CPT

## 2019-02-17 PROCEDURE — 80048 BASIC METABOLIC PNL TOTAL CA: CPT

## 2019-02-17 PROCEDURE — 81001 URINALYSIS AUTO W/SCOPE: CPT

## 2019-02-17 RX ORDER — OXYCODONE HYDROCHLORIDE AND ACETAMINOPHEN 5; 325 MG/1; MG/1
1 TABLET ORAL EVERY 8 HOURS PRN
Qty: 9 TABLET | Refills: 0 | Status: SHIPPED | OUTPATIENT
Start: 2019-02-17 | End: 2019-02-20

## 2019-02-17 RX ORDER — ONDANSETRON 4 MG/1
4 TABLET, ORALLY DISINTEGRATING ORAL EVERY 8 HOURS PRN
Qty: 20 TABLET | Refills: 0 | Status: SHIPPED | OUTPATIENT
Start: 2019-02-17 | End: 2019-05-19

## 2019-02-17 RX ORDER — PROMETHAZINE HYDROCHLORIDE 25 MG/ML
12.5 INJECTION, SOLUTION INTRAMUSCULAR; INTRAVENOUS ONCE
Status: COMPLETED | OUTPATIENT
Start: 2019-02-17 | End: 2019-02-17

## 2019-02-17 RX ORDER — KETOROLAC TROMETHAMINE 30 MG/ML
30 INJECTION, SOLUTION INTRAMUSCULAR; INTRAVENOUS ONCE
Status: COMPLETED | OUTPATIENT
Start: 2019-02-17 | End: 2019-02-17

## 2019-02-17 RX ORDER — OXYCODONE HYDROCHLORIDE AND ACETAMINOPHEN 5; 325 MG/1; MG/1
1 TABLET ORAL ONCE
Status: COMPLETED | OUTPATIENT
Start: 2019-02-17 | End: 2019-02-17

## 2019-02-17 RX ORDER — DIPHENHYDRAMINE HYDROCHLORIDE 50 MG/ML
12.5 INJECTION INTRAMUSCULAR; INTRAVENOUS ONCE
Status: COMPLETED | OUTPATIENT
Start: 2019-02-17 | End: 2019-02-17

## 2019-02-17 RX ORDER — 0.9 % SODIUM CHLORIDE 0.9 %
1000 INTRAVENOUS SOLUTION INTRAVENOUS ONCE
Status: COMPLETED | OUTPATIENT
Start: 2019-02-17 | End: 2019-02-17

## 2019-02-17 RX ORDER — MECLIZINE HYDROCHLORIDE 25 MG/1
25 TABLET ORAL 3 TIMES DAILY PRN
Qty: 20 TABLET | Refills: 0 | Status: SHIPPED | OUTPATIENT
Start: 2019-02-17 | End: 2019-02-27

## 2019-02-17 RX ORDER — ONDANSETRON 2 MG/ML
4 INJECTION INTRAMUSCULAR; INTRAVENOUS ONCE
Status: COMPLETED | OUTPATIENT
Start: 2019-02-17 | End: 2019-02-17

## 2019-02-17 RX ADMIN — PROMETHAZINE HYDROCHLORIDE 12.5 MG: 25 INJECTION INTRAMUSCULAR; INTRAVENOUS at 19:52

## 2019-02-17 RX ADMIN — DIPHENHYDRAMINE HYDROCHLORIDE 12.5 MG: 50 INJECTION, SOLUTION INTRAMUSCULAR; INTRAVENOUS at 19:54

## 2019-02-17 RX ADMIN — KETOROLAC TROMETHAMINE 30 MG: 30 INJECTION, SOLUTION INTRAMUSCULAR at 19:54

## 2019-02-17 RX ADMIN — ONDANSETRON 4 MG: 2 INJECTION INTRAMUSCULAR; INTRAVENOUS at 20:38

## 2019-02-17 RX ADMIN — OXYCODONE AND ACETAMINOPHEN 1 TABLET: 5; 325 TABLET ORAL at 21:19

## 2019-02-17 RX ADMIN — SODIUM CHLORIDE 1000 ML: 9 INJECTION, SOLUTION INTRAVENOUS at 19:52

## 2019-02-17 ASSESSMENT — PAIN SCALES - GENERAL: PAINLEVEL_OUTOF10: 8

## 2019-02-17 ASSESSMENT — ENCOUNTER SYMPTOMS
SHORTNESS OF BREATH: 0
WHEEZING: 0
ABDOMINAL PAIN: 0
CONSTIPATION: 0
DIARRHEA: 0
NAUSEA: 1
SINUS PRESSURE: 0
COUGH: 0
VOMITING: 1
COLOR CHANGE: 0
RHINORRHEA: 0
SORE THROAT: 0

## 2019-02-17 ASSESSMENT — PAIN DESCRIPTION - DESCRIPTORS: DESCRIPTORS: ACHING

## 2019-02-17 ASSESSMENT — PAIN DESCRIPTION - PAIN TYPE: TYPE: ACUTE PAIN

## 2019-03-15 ASSESSMENT — PAIN SCALES - GENERAL: PAINLEVEL_OUTOF10: 10

## 2019-03-16 ENCOUNTER — HOSPITAL ENCOUNTER (EMERGENCY)
Age: 54
Discharge: HOME | End: 2019-03-16
Attending: EMERGENCY MEDICINE
Payer: COMMERCIAL

## 2019-03-16 ENCOUNTER — APPOINTMENT (OUTPATIENT)
Dept: CT IMAGING | Age: 54
End: 2019-03-16
Payer: COMMERCIAL

## 2019-03-16 VITALS
OXYGEN SATURATION: 93 % | RESPIRATION RATE: 16 BRPM | TEMPERATURE: 99.6 F | WEIGHT: 234.25 LBS | BODY MASS INDEX: 34.69 KG/M2 | HEART RATE: 90 BPM | DIASTOLIC BLOOD PRESSURE: 76 MMHG | SYSTOLIC BLOOD PRESSURE: 151 MMHG | HEIGHT: 69 IN

## 2019-03-16 DIAGNOSIS — G43.709 CHRONIC MIGRAINE WITHOUT AURA WITHOUT STATUS MIGRAINOSUS, NOT INTRACTABLE: Primary | ICD-10-CM

## 2019-03-16 LAB
ABSOLUTE EOS #: 0.1 K/UL (ref 0–0.4)
ABSOLUTE IMMATURE GRANULOCYTE: ABNORMAL K/UL (ref 0–0.3)
ABSOLUTE LYMPH #: 0.6 K/UL (ref 1–4.8)
ABSOLUTE MONO #: 0.3 K/UL (ref 0.2–0.8)
ANION GAP SERPL CALCULATED.3IONS-SCNC: 12 MMOL/L (ref 9–17)
BASOPHILS # BLD: 0 % (ref 0–2)
BASOPHILS ABSOLUTE: 0 K/UL (ref 0–0.2)
BUN BLDV-MCNC: 15 MG/DL (ref 6–20)
BUN/CREAT BLD: 10 (ref 9–20)
CALCIUM SERPL-MCNC: 8.6 MG/DL (ref 8.6–10.4)
CHLORIDE BLD-SCNC: 99 MMOL/L (ref 98–107)
CO2: 23 MMOL/L (ref 20–31)
CREAT SERPL-MCNC: 1.45 MG/DL (ref 0.7–1.2)
DIFFERENTIAL TYPE: ABNORMAL
DIRECT EXAM: NORMAL
EOSINOPHILS RELATIVE PERCENT: 1 % (ref 1–4)
GFR AFRICAN AMERICAN: >60 ML/MIN
GFR NON-AFRICAN AMERICAN: 51 ML/MIN
GFR SERPL CREATININE-BSD FRML MDRD: ABNORMAL ML/MIN/{1.73_M2}
GFR SERPL CREATININE-BSD FRML MDRD: ABNORMAL ML/MIN/{1.73_M2}
GLUCOSE BLD-MCNC: 146 MG/DL (ref 70–99)
HCT VFR BLD CALC: 35.4 % (ref 41–53)
HEMOGLOBIN: 12.4 G/DL (ref 13.5–17.5)
IMMATURE GRANULOCYTES: ABNORMAL %
LYMPHOCYTES # BLD: 9 % (ref 24–44)
Lab: NORMAL
MCH RBC QN AUTO: 28.6 PG (ref 26–34)
MCHC RBC AUTO-ENTMCNC: 34.9 G/DL (ref 31–37)
MCV RBC AUTO: 82.2 FL (ref 80–100)
MONOCYTES # BLD: 5 % (ref 1–7)
NRBC AUTOMATED: ABNORMAL PER 100 WBC
PDW BLD-RTO: 14.1 % (ref 11.5–14.5)
PLATELET # BLD: 229 K/UL (ref 130–400)
PLATELET ESTIMATE: ABNORMAL
PMV BLD AUTO: 6.5 FL (ref 6–12)
POTASSIUM SERPL-SCNC: 4.2 MMOL/L (ref 3.7–5.3)
RBC # BLD: 4.31 M/UL (ref 4.5–5.9)
RBC # BLD: ABNORMAL 10*6/UL
SEG NEUTROPHILS: 85 % (ref 36–66)
SEGMENTED NEUTROPHILS ABSOLUTE COUNT: 5.6 K/UL (ref 1.8–7.7)
SODIUM BLD-SCNC: 134 MMOL/L (ref 135–144)
SPECIMEN DESCRIPTION: NORMAL
WBC # BLD: 6.6 K/UL (ref 3.5–11)
WBC # BLD: ABNORMAL 10*3/UL

## 2019-03-16 PROCEDURE — 96375 TX/PRO/DX INJ NEW DRUG ADDON: CPT

## 2019-03-16 PROCEDURE — 85025 COMPLETE CBC W/AUTO DIFF WBC: CPT

## 2019-03-16 PROCEDURE — 87804 INFLUENZA ASSAY W/OPTIC: CPT

## 2019-03-16 PROCEDURE — 2580000003 HC RX 258: Performed by: EMERGENCY MEDICINE

## 2019-03-16 PROCEDURE — 96374 THER/PROPH/DIAG INJ IV PUSH: CPT

## 2019-03-16 PROCEDURE — 6360000002 HC RX W HCPCS: Performed by: EMERGENCY MEDICINE

## 2019-03-16 PROCEDURE — 70450 CT HEAD/BRAIN W/O DYE: CPT

## 2019-03-16 PROCEDURE — 99284 EMERGENCY DEPT VISIT MOD MDM: CPT

## 2019-03-16 PROCEDURE — 80048 BASIC METABOLIC PNL TOTAL CA: CPT

## 2019-03-16 RX ORDER — 0.9 % SODIUM CHLORIDE 0.9 %
1000 INTRAVENOUS SOLUTION INTRAVENOUS ONCE
Status: COMPLETED | OUTPATIENT
Start: 2019-03-16 | End: 2019-03-16

## 2019-03-16 RX ORDER — ONDANSETRON 2 MG/ML
8 INJECTION INTRAMUSCULAR; INTRAVENOUS ONCE
Status: COMPLETED | OUTPATIENT
Start: 2019-03-16 | End: 2019-03-16

## 2019-03-16 RX ORDER — ONDANSETRON 4 MG/1
4 TABLET, ORALLY DISINTEGRATING ORAL EVERY 8 HOURS PRN
Qty: 20 TABLET | Refills: 0 | Status: SHIPPED | OUTPATIENT
Start: 2019-03-16

## 2019-03-16 RX ORDER — DIPHENHYDRAMINE HYDROCHLORIDE 50 MG/ML
25 INJECTION INTRAMUSCULAR; INTRAVENOUS ONCE
Status: COMPLETED | OUTPATIENT
Start: 2019-03-16 | End: 2019-03-16

## 2019-03-16 RX ORDER — OXYCODONE HYDROCHLORIDE AND ACETAMINOPHEN 5; 325 MG/1; MG/1
1 TABLET ORAL EVERY 8 HOURS PRN
Qty: 10 TABLET | Refills: 0 | Status: SHIPPED | OUTPATIENT
Start: 2019-03-16 | End: 2019-03-21

## 2019-03-16 RX ORDER — HYDROMORPHONE HYDROCHLORIDE 1 MG/ML
1 INJECTION, SOLUTION INTRAMUSCULAR; INTRAVENOUS; SUBCUTANEOUS ONCE
Status: COMPLETED | OUTPATIENT
Start: 2019-03-16 | End: 2019-03-16

## 2019-03-16 RX ORDER — METOCLOPRAMIDE HYDROCHLORIDE 5 MG/ML
10 INJECTION INTRAMUSCULAR; INTRAVENOUS ONCE
Status: COMPLETED | OUTPATIENT
Start: 2019-03-16 | End: 2019-03-16

## 2019-03-16 RX ORDER — DEXAMETHASONE SODIUM PHOSPHATE 10 MG/ML
10 INJECTION, SOLUTION INTRAMUSCULAR; INTRAVENOUS ONCE
Status: COMPLETED | OUTPATIENT
Start: 2019-03-16 | End: 2019-03-16

## 2019-03-16 RX ORDER — BUTALBITAL, ACETAMINOPHEN AND CAFFEINE 300; 40; 50 MG/1; MG/1; MG/1
1 CAPSULE ORAL EVERY 4 HOURS PRN
Qty: 20 CAPSULE | Refills: 0 | Status: ON HOLD | OUTPATIENT
Start: 2019-03-16 | End: 2020-03-04 | Stop reason: ALTCHOICE

## 2019-03-16 RX ADMIN — ONDANSETRON 8 MG: 2 INJECTION INTRAMUSCULAR; INTRAVENOUS at 00:28

## 2019-03-16 RX ADMIN — HYDROMORPHONE HYDROCHLORIDE 1 MG: 1 INJECTION, SOLUTION INTRAMUSCULAR; INTRAVENOUS; SUBCUTANEOUS at 00:28

## 2019-03-16 RX ADMIN — SODIUM CHLORIDE 1000 ML: 9 INJECTION, SOLUTION INTRAVENOUS at 00:28

## 2019-03-16 RX ADMIN — METOCLOPRAMIDE 10 MG: 5 INJECTION, SOLUTION INTRAMUSCULAR; INTRAVENOUS at 00:28

## 2019-03-16 RX ADMIN — DIPHENHYDRAMINE HYDROCHLORIDE 25 MG: 50 INJECTION, SOLUTION INTRAMUSCULAR; INTRAVENOUS at 00:28

## 2019-03-16 RX ADMIN — DEXAMETHASONE SODIUM PHOSPHATE 10 MG: 10 INJECTION INTRAMUSCULAR; INTRAVENOUS at 00:28

## 2019-03-16 ASSESSMENT — PAIN SCALES - GENERAL: PAINLEVEL_OUTOF10: 10

## 2019-03-18 ENCOUNTER — HOSPITAL ENCOUNTER (EMERGENCY)
Age: 54
Discharge: HOME | End: 2019-03-18
Attending: EMERGENCY MEDICINE
Payer: COMMERCIAL

## 2019-03-18 ENCOUNTER — APPOINTMENT (OUTPATIENT)
Dept: CT IMAGING | Age: 54
End: 2019-03-18
Payer: COMMERCIAL

## 2019-03-18 VITALS
TEMPERATURE: 98.1 F | RESPIRATION RATE: 16 BRPM | BODY MASS INDEX: 33.47 KG/M2 | WEIGHT: 226 LBS | DIASTOLIC BLOOD PRESSURE: 84 MMHG | HEART RATE: 77 BPM | HEIGHT: 69 IN | OXYGEN SATURATION: 99 % | SYSTOLIC BLOOD PRESSURE: 152 MMHG

## 2019-03-18 DIAGNOSIS — R19.7 DIARRHEA, UNSPECIFIED TYPE: ICD-10-CM

## 2019-03-18 DIAGNOSIS — R59.1 LYMPHADENOPATHY: Primary | ICD-10-CM

## 2019-03-18 LAB
ABSOLUTE EOS #: 0.11 K/UL (ref 0–0.4)
ABSOLUTE IMMATURE GRANULOCYTE: ABNORMAL K/UL (ref 0–0.3)
ABSOLUTE LYMPH #: 1.05 K/UL (ref 1–4.8)
ABSOLUTE MONO #: 0.5 K/UL (ref 0.2–0.8)
ANION GAP SERPL CALCULATED.3IONS-SCNC: 11 MMOL/L (ref 9–17)
BASOPHILS # BLD: 0 %
BASOPHILS ABSOLUTE: 0 K/UL (ref 0–0.2)
BUN BLDV-MCNC: 24 MG/DL (ref 6–20)
BUN/CREAT BLD: 16 (ref 9–20)
CALCIUM SERPL-MCNC: 8.8 MG/DL (ref 8.6–10.4)
CHLORIDE BLD-SCNC: 102 MMOL/L (ref 98–107)
CO2: 22 MMOL/L (ref 20–31)
CREAT SERPL-MCNC: 1.53 MG/DL (ref 0.7–1.2)
DIFFERENTIAL TYPE: ABNORMAL
EOSINOPHILS RELATIVE PERCENT: 2 % (ref 1–4)
GFR AFRICAN AMERICAN: 58 ML/MIN
GFR NON-AFRICAN AMERICAN: 48 ML/MIN
GFR SERPL CREATININE-BSD FRML MDRD: ABNORMAL ML/MIN/{1.73_M2}
GFR SERPL CREATININE-BSD FRML MDRD: ABNORMAL ML/MIN/{1.73_M2}
GLUCOSE BLD-MCNC: 119 MG/DL (ref 70–99)
HCT VFR BLD CALC: 38.2 % (ref 41–53)
HEMOGLOBIN: 13.2 G/DL (ref 13.5–17.5)
IMMATURE GRANULOCYTES: ABNORMAL %
LYMPHOCYTES # BLD: 19 % (ref 24–44)
MCH RBC QN AUTO: 28.4 PG (ref 26–34)
MCHC RBC AUTO-ENTMCNC: 34.5 G/DL (ref 31–37)
MCV RBC AUTO: 82.2 FL (ref 80–100)
MONOCYTES # BLD: 9 % (ref 1–7)
NRBC AUTOMATED: ABNORMAL PER 100 WBC
PDW BLD-RTO: 14.3 % (ref 11.5–14.5)
PLATELET # BLD: 229 K/UL (ref 130–400)
PLATELET ESTIMATE: ABNORMAL
PMV BLD AUTO: 6.8 FL (ref 6–12)
POTASSIUM SERPL-SCNC: 3.9 MMOL/L (ref 3.7–5.3)
RBC # BLD: 4.65 M/UL (ref 4.5–5.9)
RBC # BLD: ABNORMAL 10*6/UL
SEG NEUTROPHILS: 70 % (ref 36–66)
SEGMENTED NEUTROPHILS ABSOLUTE COUNT: 3.84 K/UL (ref 1.8–7.7)
SODIUM BLD-SCNC: 135 MMOL/L (ref 135–144)
TSH SERPL DL<=0.05 MIU/L-ACNC: 3.51 MIU/L (ref 0.3–5)
WBC # BLD: 5.5 K/UL (ref 3.5–11)
WBC # BLD: ABNORMAL 10*3/UL

## 2019-03-18 PROCEDURE — 96360 HYDRATION IV INFUSION INIT: CPT

## 2019-03-18 PROCEDURE — 99284 EMERGENCY DEPT VISIT MOD MDM: CPT

## 2019-03-18 PROCEDURE — 6360000004 HC RX CONTRAST MEDICATION: Performed by: NURSE PRACTITIONER

## 2019-03-18 PROCEDURE — 84443 ASSAY THYROID STIM HORMONE: CPT

## 2019-03-18 PROCEDURE — 80048 BASIC METABOLIC PNL TOTAL CA: CPT

## 2019-03-18 PROCEDURE — 85025 COMPLETE CBC W/AUTO DIFF WBC: CPT

## 2019-03-18 PROCEDURE — 2580000003 HC RX 258: Performed by: NURSE PRACTITIONER

## 2019-03-18 PROCEDURE — 70491 CT SOFT TISSUE NECK W/DYE: CPT

## 2019-03-18 RX ORDER — 0.9 % SODIUM CHLORIDE 0.9 %
50 INTRAVENOUS SOLUTION INTRAVENOUS ONCE
Status: COMPLETED | OUTPATIENT
Start: 2019-03-18 | End: 2019-03-18

## 2019-03-18 RX ORDER — 0.9 % SODIUM CHLORIDE 0.9 %
1000 INTRAVENOUS SOLUTION INTRAVENOUS ONCE
Status: COMPLETED | OUTPATIENT
Start: 2019-03-18 | End: 2019-03-18

## 2019-03-18 RX ORDER — SODIUM CHLORIDE 0.9 % (FLUSH) 0.9 %
10 SYRINGE (ML) INJECTION
Status: COMPLETED | OUTPATIENT
Start: 2019-03-18 | End: 2019-03-18

## 2019-03-18 RX ADMIN — SODIUM CHLORIDE 50 ML: 0.9 INJECTION, SOLUTION INTRAVENOUS at 19:57

## 2019-03-18 RX ADMIN — SODIUM CHLORIDE, PRESERVATIVE FREE 10 ML: 5 INJECTION INTRAVENOUS at 19:58

## 2019-03-18 RX ADMIN — IOPAMIDOL 75 ML: 755 INJECTION, SOLUTION INTRAVENOUS at 19:57

## 2019-03-18 RX ADMIN — SODIUM CHLORIDE 1000 ML: 9 INJECTION, SOLUTION INTRAVENOUS at 19:14

## 2019-03-18 ASSESSMENT — PAIN DESCRIPTION - PROGRESSION: CLINICAL_PROGRESSION: GRADUALLY WORSENING

## 2019-03-18 ASSESSMENT — ENCOUNTER SYMPTOMS
RHINORRHEA: 0
SHORTNESS OF BREATH: 0
WHEEZING: 0
SINUS PRESSURE: 0
VOMITING: 0
SORE THROAT: 0
ABDOMINAL PAIN: 0
CONSTIPATION: 0
COUGH: 0
NAUSEA: 0
COLOR CHANGE: 0
DIARRHEA: 0

## 2019-03-18 ASSESSMENT — PAIN DESCRIPTION - ONSET: ONSET: ON-GOING

## 2019-03-18 ASSESSMENT — PAIN DESCRIPTION - LOCATION: LOCATION: HEAD

## 2019-03-18 ASSESSMENT — PAIN DESCRIPTION - FREQUENCY: FREQUENCY: CONTINUOUS

## 2019-03-18 ASSESSMENT — PAIN SCALES - GENERAL: PAINLEVEL_OUTOF10: 8

## 2019-03-18 ASSESSMENT — PAIN DESCRIPTION - PAIN TYPE: TYPE: ACUTE PAIN

## 2019-03-18 ASSESSMENT — PAIN - FUNCTIONAL ASSESSMENT: PAIN_FUNCTIONAL_ASSESSMENT: PREVENTS OR INTERFERES SOME ACTIVE ACTIVITIES AND ADLS

## 2019-03-18 ASSESSMENT — PAIN DESCRIPTION - DESCRIPTORS: DESCRIPTORS: ACHING

## 2019-03-19 ENCOUNTER — TELEPHONE (OUTPATIENT)
Dept: FAMILY MEDICINE CLINIC | Age: 54
End: 2019-03-19

## 2019-05-19 ENCOUNTER — HOSPITAL ENCOUNTER (EMERGENCY)
Age: 54
Discharge: HOME | End: 2019-05-20
Attending: EMERGENCY MEDICINE
Payer: COMMERCIAL

## 2019-05-19 DIAGNOSIS — G43.809 OTHER MIGRAINE WITHOUT STATUS MIGRAINOSUS, NOT INTRACTABLE: Primary | ICD-10-CM

## 2019-05-19 PROCEDURE — 96375 TX/PRO/DX INJ NEW DRUG ADDON: CPT

## 2019-05-19 PROCEDURE — 6360000002 HC RX W HCPCS: Performed by: EMERGENCY MEDICINE

## 2019-05-19 PROCEDURE — 96374 THER/PROPH/DIAG INJ IV PUSH: CPT

## 2019-05-19 PROCEDURE — 99282 EMERGENCY DEPT VISIT SF MDM: CPT

## 2019-05-19 PROCEDURE — 2580000003 HC RX 258: Performed by: EMERGENCY MEDICINE

## 2019-05-19 PROCEDURE — 96372 THER/PROPH/DIAG INJ SC/IM: CPT

## 2019-05-19 RX ORDER — KETOROLAC TROMETHAMINE 30 MG/ML
30 INJECTION, SOLUTION INTRAMUSCULAR; INTRAVENOUS ONCE
Status: COMPLETED | OUTPATIENT
Start: 2019-05-19 | End: 2019-05-19

## 2019-05-19 RX ORDER — 0.9 % SODIUM CHLORIDE 0.9 %
1000 INTRAVENOUS SOLUTION INTRAVENOUS ONCE
Status: COMPLETED | OUTPATIENT
Start: 2019-05-19 | End: 2019-05-20

## 2019-05-19 RX ORDER — DIPHENHYDRAMINE HYDROCHLORIDE 50 MG/ML
25 INJECTION INTRAMUSCULAR; INTRAVENOUS ONCE
Status: COMPLETED | OUTPATIENT
Start: 2019-05-19 | End: 2019-05-19

## 2019-05-19 RX ORDER — PROMETHAZINE HYDROCHLORIDE 25 MG/ML
6.25 INJECTION, SOLUTION INTRAMUSCULAR; INTRAVENOUS ONCE
Status: COMPLETED | OUTPATIENT
Start: 2019-05-19 | End: 2019-05-19

## 2019-05-19 RX ORDER — NORTRIPTYLINE HYDROCHLORIDE 25 MG/1
25 CAPSULE ORAL NIGHTLY
Status: ON HOLD | COMMUNITY
Start: 2019-05-14 | End: 2020-03-04 | Stop reason: ALTCHOICE

## 2019-05-19 RX ADMIN — DIPHENHYDRAMINE HYDROCHLORIDE 25 MG: 50 INJECTION, SOLUTION INTRAMUSCULAR; INTRAVENOUS at 23:27

## 2019-05-19 RX ADMIN — SODIUM CHLORIDE 1000 ML: 9 INJECTION, SOLUTION INTRAVENOUS at 23:27

## 2019-05-19 RX ADMIN — KETOROLAC TROMETHAMINE 30 MG: 30 INJECTION, SOLUTION INTRAMUSCULAR at 23:28

## 2019-05-19 RX ADMIN — PROMETHAZINE HYDROCHLORIDE 6.25 MG: 25 INJECTION INTRAMUSCULAR; INTRAVENOUS at 23:29

## 2019-05-19 ASSESSMENT — PAIN SCALES - GENERAL
PAINLEVEL_OUTOF10: 7
PAINLEVEL_OUTOF10: 9
PAINLEVEL_OUTOF10: 9

## 2019-05-19 NOTE — LETTER
[facility] ED  [place] [address]  Phone: [phone]               May 20, 2019    Patient: Florian Barillas   YOB: 1965   Date of Visit: 5/19/2019       To Whom It May Concern:    Ashlee Gaviria was seen and treated in our emergency department on 5/19/2019. He may be excused from work 5/19-20/2019.        Sincerely,       [facility] ED Staff        Signature:__________________________________

## 2019-05-20 VITALS
TEMPERATURE: 98 F | SYSTOLIC BLOOD PRESSURE: 166 MMHG | DIASTOLIC BLOOD PRESSURE: 85 MMHG | WEIGHT: 234 LBS | HEART RATE: 73 BPM | RESPIRATION RATE: 16 BRPM | OXYGEN SATURATION: 98 % | HEIGHT: 69 IN | BODY MASS INDEX: 34.66 KG/M2

## 2019-05-20 PROCEDURE — 96375 TX/PRO/DX INJ NEW DRUG ADDON: CPT

## 2019-05-20 PROCEDURE — 6360000002 HC RX W HCPCS: Performed by: EMERGENCY MEDICINE

## 2019-05-20 RX ORDER — ONDANSETRON 2 MG/ML
4 INJECTION INTRAMUSCULAR; INTRAVENOUS ONCE
Status: COMPLETED | OUTPATIENT
Start: 2019-05-20 | End: 2019-05-20

## 2019-05-20 RX ORDER — PROCHLORPERAZINE EDISYLATE 5 MG/ML
10 INJECTION INTRAMUSCULAR; INTRAVENOUS EVERY 6 HOURS PRN
Status: DISCONTINUED | OUTPATIENT
Start: 2019-05-20 | End: 2019-05-20 | Stop reason: HOSPADM

## 2019-05-20 RX ADMIN — ONDANSETRON 4 MG: 2 INJECTION INTRAMUSCULAR; INTRAVENOUS at 00:23

## 2019-05-20 RX ADMIN — PROCHLORPERAZINE EDISYLATE 10 MG: 5 INJECTION INTRAMUSCULAR; INTRAVENOUS at 00:23

## 2019-05-20 NOTE — ED NOTES
Pt presents to ED via private auto accompanied by wife with c/o headache. Pt states having a frontal headache that started today with no relief from his prescribed medications. Pt also states that this headache \"hurts really bad and hurts more than his normal headaches. \" Pt's neuro assessment is WNL. Pt denies nausea and emesis at this time. Pt's mucous membranes are pink and moist, pt's skin is warm and dry. Pt's respirations are even and non-labored, no distress noted at this time, will continue to monitor pt.       Pearl Sawyer RN  05/19/19 7741

## 2019-05-20 NOTE — ED NOTES
Patient states he is feeling a little better. Pain is on and off.       Hollis Nava RN  05/20/19 1108

## 2019-05-20 NOTE — ED PROVIDER NOTES
EMERGENCY DEPARTMENT ENCOUNTER    Pt Name: Ryan Love  MRN: [id number]  [place] 1965  Date of evaluation: 5/20/19  CHIEF COMPLAINT       Chief Complaint   Patient presents with    Headache     HISTORY OF PRESENT ILLNESS   HPI     Patient is a 71-year-old male presenting to the ED for evaluation of worsening headache that started earlier today. Patient has a history of migraine headache and is currently on TCA for preventative and sumatriptan for abortive. Patient states that he has taken his sumatriptan today for this headache without any relief. Patient described a headache in the frontal region radiating to the back 7 out of 10. Patient with no nausea, vomiting. Patient with no changes in vision. Patient with no fever chills or neck pain. REVIEW OF SYSTEMS     Review of Systems   All other systems reviewed and are negative. PASTMEDICAL HISTORY     Past Medical History:   Diagnosis Date    Diabetes mellitus ([address]     Headache     Hypertension      SURGICAL HISTORY       Past Surgical History:   Procedure Laterality Date    ROTATOR CUFF REPAIR      ROTATOR CUFF REPAIR Right      CURRENT MEDICATIONS       Previous Medications    BUTALBITAL-APAP-CAFFEINE (FIORICET) -40 MG CAPS PER CAPSULE    Take 1 capsule by mouth every 4 hours as needed for Headaches or Migraine    GLIMEPIRIDE (AMARYL) 1 MG TABLET    Take 1 mg by mouth every morning (before breakfast)    GLIPIZIDE (GLUCOTROL) 10 MG TABLET    Take 10 mg by mouth daily    IBUPROFEN (ADVIL;MOTRIN) 600 MG TABLET    Take 1 tablet by mouth every 8 hours as needed for Pain    INSULIN GLARGINE (LANTUS) 100 UNIT/ML INJECTION VIAL    Inject 15 Units into the skin 2 times daily.     LISINOPRIL (PRINIVIL) 20 MG TABLET    Take 1 tablet by mouth daily    NORTRIPTYLINE (PAMELOR) 50 MG CAPSULE        NYSTATIN (MYCOSTATIN) 149748 UNIT/GM POWDER    Apply to left groin BID    ONDANSETRON (ZOFRAN ODT) 4 MG DISINTEGRATING TABLET    Take 1 tablet by mouth every 8 hours as needed for Nausea    SUMATRIPTAN (IMITREX) 25 MG TABLET    Take 25 mg by mouth once as needed for Migraine     ALLERGIES     has No Known Allergies. FAMILY HISTORY     has no family status information on file. SOCIAL HISTORY       Social History     Tobacco Use    Smoking status: Never Smoker    Smokeless tobacco: Never Used   Substance Use Topics    Alcohol use: No    Drug use: No     PHYSICAL EXAM     INITIAL VITALS:   Vitals:    05/19/19 2249 05/20/19 0005   BP: (!) 172/88 (!) 166/85   Pulse: 73    Resp: 16    Temp: 98 °F (36.7 °C)    TempSrc: Oral    SpO2: 98%    Weight: 234 lb (106.1 kg)    Height: 5' 9\" (1.753 m)        Physical Exam   Constitutional: He is oriented to person, place, and time. He appears well-developed. HENT:   Head: Normocephalic and atraumatic. Eyes: Conjunctivae and EOM are normal.   Neck: Normal range of motion. Neck supple. Cardiovascular: Normal rate, S1 normal and normal heart sounds. Pulmonary/Chest: Effort normal and breath sounds normal.   Abdominal: Soft. There is no tenderness. Musculoskeletal: Normal range of motion. He exhibits no edema. Neurological: He is alert and oriented to person, place, and time. Skin: Skin is warm and dry. MEDICAL DECISION MAKING:     Treatment for exacerbation of migraine headache. Patient initially treated with Toradol Phenergan and Benadryl. Was reevaluated at that point but had only minimal relief. Patient was then given Compazine and Zofran and felt much better. CRITICAL CARE:       PROCEDURES:    Procedures    DIAGNOSTIC RESULTS   EKG:All EKG's are interpreted by the Emergency Department Physician who either signs or Co-signs this chart in the absence of a cardiologist.        RADIOLOGY:All plain film, CT, MRI, and formal ultrasound images (except ED bedside ultrasound) are read by the radiologist, see reports below, unless otherwisenoted in MDM or here.   No orders to display     LABS: All lab results were reviewed by myself, and all abnormals are listed below. Labs Reviewed - No data to display    EMERGENCY DEPARTMENTCOURSE:         Vitals:    Vitals:    05/19/19 2249 05/20/19 0005   BP: (!) 172/88 (!) 166/85   Pulse: 73    Resp: 16    Temp: 98 °F (36.7 °C)    TempSrc: Oral    SpO2: 98%    Weight: 234 lb (106.1 kg)    Height: 5' 9\" (1.753 m)        The patient was given the following medications while in the emergency department:  Orders Placed This Encounter   Medications    0.9 % sodium chloride bolus    ketorolac (TORADOL) injection 30 mg    promethazine (PHENERGAN) injection 6.25 mg    diphenhydrAMINE (BENADRYL) injection 25 mg    prochlorperazine (COMPAZINE) injection 10 mg    ondansetron (ZOFRAN) injection 4 mg     CONSULTS:  None    FINAL IMPRESSION      1.  Other migraine without status migrainosus, not intractable          DISPOSITION/PLAN   DISPOSITION Decision To Discharge 05/20/2019 02:13:32 AM      PATIENT REFERRED TO:  Christie Reid MD  [address] [place] [id number] [phone]    Schedule an appointment as soon as possible for a visit   As needed    DISCHARGE MEDICATIONS:  New Prescriptions    No medications on file     Lindsey Reno MD  Attending Emergency Physician                  Beth Duran MD  05/20/19 0660

## 2020-03-01 ENCOUNTER — HOSPITAL ENCOUNTER (INPATIENT)
Age: 55
LOS: 3 days | Discharge: HOME | DRG: 684 | End: 2020-03-04
Attending: EMERGENCY MEDICINE | Admitting: INTERNAL MEDICINE
Payer: COMMERCIAL

## 2020-03-01 ENCOUNTER — APPOINTMENT (OUTPATIENT)
Dept: GENERAL RADIOLOGY | Age: 55
DRG: 684 | End: 2020-03-01
Payer: COMMERCIAL

## 2020-03-01 PROBLEM — N17.9 AKI (ACUTE KIDNEY INJURY) (HCC): Status: ACTIVE | Noted: 2020-03-01

## 2020-03-01 LAB
ABSOLUTE EOS #: 0.44 K/UL (ref 0–0.44)
ABSOLUTE IMMATURE GRANULOCYTE: 0.03 K/UL (ref 0–0.3)
ABSOLUTE LYMPH #: 1.47 K/UL (ref 1.1–3.7)
ABSOLUTE MONO #: 0.51 K/UL (ref 0.1–1.2)
ANION GAP SERPL CALCULATED.3IONS-SCNC: 13 MMOL/L (ref 9–17)
BASOPHILS # BLD: 1 % (ref 0–2)
BASOPHILS ABSOLUTE: 0.04 K/UL (ref 0–0.2)
BUN BLDV-MCNC: 33 MG/DL (ref 6–20)
BUN/CREAT BLD: 15 (ref 9–20)
CALCIUM SERPL-MCNC: 9.9 MG/DL (ref 8.6–10.4)
CHLORIDE BLD-SCNC: 101 MMOL/L (ref 98–107)
CO2: 19 MMOL/L (ref 20–31)
CREAT SERPL-MCNC: 2.26 MG/DL (ref 0.7–1.2)
DIFFERENTIAL TYPE: ABNORMAL
EOSINOPHILS RELATIVE PERCENT: 6 % (ref 1–4)
GFR AFRICAN AMERICAN: 37 ML/MIN
GFR NON-AFRICAN AMERICAN: 31 ML/MIN
GFR SERPL CREATININE-BSD FRML MDRD: ABNORMAL ML/MIN/{1.73_M2}
GFR SERPL CREATININE-BSD FRML MDRD: ABNORMAL ML/MIN/{1.73_M2}
GLUCOSE BLD-MCNC: 183 MG/DL (ref 70–99)
HCT VFR BLD CALC: 36.3 % (ref 40.7–50.3)
HEMOGLOBIN: 12 G/DL (ref 13–17)
IMMATURE GRANULOCYTES: 0 %
INR BLD: 1.1
LYMPHOCYTES # BLD: 21 % (ref 24–43)
MCH RBC QN AUTO: 28.2 PG (ref 25.2–33.5)
MCHC RBC AUTO-ENTMCNC: 33.1 G/DL (ref 28.4–34.8)
MCV RBC AUTO: 85.4 FL (ref 82.6–102.9)
MONOCYTES # BLD: 7 % (ref 3–12)
MYOGLOBIN: 91 NG/ML (ref 28–72)
MYOGLOBIN: 98 NG/ML (ref 28–72)
NRBC AUTOMATED: 0 PER 100 WBC
PARTIAL THROMBOPLASTIN TIME: 24.7 SEC (ref 23–31)
PDW BLD-RTO: 13.5 % (ref 11.8–14.4)
PLATELET # BLD: 214 K/UL (ref 138–453)
PLATELET ESTIMATE: ABNORMAL
PMV BLD AUTO: 9.5 FL (ref 8.1–13.5)
POTASSIUM SERPL-SCNC: 4.1 MMOL/L (ref 3.7–5.3)
PROTHROMBIN TIME: 10.9 SEC (ref 9.7–11.6)
RBC # BLD: 4.25 M/UL (ref 4.21–5.77)
RBC # BLD: ABNORMAL 10*6/UL
SEG NEUTROPHILS: 65 % (ref 36–65)
SEGMENTED NEUTROPHILS ABSOLUTE COUNT: 4.49 K/UL (ref 1.5–8.1)
SODIUM BLD-SCNC: 133 MMOL/L (ref 135–144)
TROPONIN INTERP: ABNORMAL
TROPONIN INTERP: ABNORMAL
TROPONIN T: ABNORMAL NG/ML
TROPONIN T: ABNORMAL NG/ML
TROPONIN, HIGH SENSITIVITY: 14 NG/L (ref 0–22)
TROPONIN, HIGH SENSITIVITY: 14 NG/L (ref 0–22)
WBC # BLD: 7 K/UL (ref 3.5–11.3)
WBC # BLD: ABNORMAL 10*3/UL

## 2020-03-01 PROCEDURE — 85730 THROMBOPLASTIN TIME PARTIAL: CPT

## 2020-03-01 PROCEDURE — 81001 URINALYSIS AUTO W/SCOPE: CPT

## 2020-03-01 PROCEDURE — 84300 ASSAY OF URINE SODIUM: CPT

## 2020-03-01 PROCEDURE — 99285 EMERGENCY DEPT VISIT HI MDM: CPT

## 2020-03-01 PROCEDURE — 6360000002 HC RX W HCPCS: Performed by: EMERGENCY MEDICINE

## 2020-03-01 PROCEDURE — 96361 HYDRATE IV INFUSION ADD-ON: CPT

## 2020-03-01 PROCEDURE — 83874 ASSAY OF MYOGLOBIN: CPT

## 2020-03-01 PROCEDURE — 85610 PROTHROMBIN TIME: CPT

## 2020-03-01 PROCEDURE — 84484 ASSAY OF TROPONIN QUANT: CPT

## 2020-03-01 PROCEDURE — 93005 ELECTROCARDIOGRAM TRACING: CPT | Performed by: EMERGENCY MEDICINE

## 2020-03-01 PROCEDURE — 99223 1ST HOSP IP/OBS HIGH 75: CPT | Performed by: NURSE PRACTITIONER

## 2020-03-01 PROCEDURE — 84156 ASSAY OF PROTEIN URINE: CPT

## 2020-03-01 PROCEDURE — 36415 COLL VENOUS BLD VENIPUNCTURE: CPT

## 2020-03-01 PROCEDURE — 80048 BASIC METABOLIC PNL TOTAL CA: CPT

## 2020-03-01 PROCEDURE — 96374 THER/PROPH/DIAG INJ IV PUSH: CPT

## 2020-03-01 PROCEDURE — 2580000003 HC RX 258: Performed by: EMERGENCY MEDICINE

## 2020-03-01 PROCEDURE — 1200000000 HC SEMI PRIVATE

## 2020-03-01 PROCEDURE — 85025 COMPLETE CBC W/AUTO DIFF WBC: CPT

## 2020-03-01 PROCEDURE — 71045 X-RAY EXAM CHEST 1 VIEW: CPT

## 2020-03-01 PROCEDURE — 6370000000 HC RX 637 (ALT 250 FOR IP): Performed by: EMERGENCY MEDICINE

## 2020-03-01 RX ORDER — SODIUM CHLORIDE 9 MG/ML
INJECTION, SOLUTION INTRAVENOUS CONTINUOUS
Status: DISCONTINUED | OUTPATIENT
Start: 2020-03-01 | End: 2020-03-01

## 2020-03-01 RX ORDER — SODIUM CHLORIDE 0.9 % (FLUSH) 0.9 %
10 SYRINGE (ML) INJECTION PRN
Status: DISCONTINUED | OUTPATIENT
Start: 2020-03-01 | End: 2020-03-04 | Stop reason: HOSPADM

## 2020-03-01 RX ORDER — DEXTROSE MONOHYDRATE 50 MG/ML
100 INJECTION, SOLUTION INTRAVENOUS PRN
Status: DISCONTINUED | OUTPATIENT
Start: 2020-03-01 | End: 2020-03-04 | Stop reason: HOSPADM

## 2020-03-01 RX ORDER — NICOTINE 21 MG/24HR
1 PATCH, TRANSDERMAL 24 HOURS TRANSDERMAL DAILY PRN
Status: DISCONTINUED | OUTPATIENT
Start: 2020-03-01 | End: 2020-03-04 | Stop reason: HOSPADM

## 2020-03-01 RX ORDER — INSULIN GLARGINE 100 [IU]/ML
15 INJECTION, SOLUTION SUBCUTANEOUS 2 TIMES DAILY
Status: DISCONTINUED | OUTPATIENT
Start: 2020-03-02 | End: 2020-03-04 | Stop reason: HOSPADM

## 2020-03-01 RX ORDER — HEPARIN SODIUM 5000 [USP'U]/ML
5000 INJECTION, SOLUTION INTRAVENOUS; SUBCUTANEOUS EVERY 8 HOURS SCHEDULED
Status: DISCONTINUED | OUTPATIENT
Start: 2020-03-02 | End: 2020-03-04 | Stop reason: HOSPADM

## 2020-03-01 RX ORDER — ATORVASTATIN CALCIUM 40 MG/1
40 TABLET, FILM COATED ORAL NIGHTLY
Status: DISCONTINUED | OUTPATIENT
Start: 2020-03-02 | End: 2020-03-04 | Stop reason: HOSPADM

## 2020-03-01 RX ORDER — MORPHINE SULFATE 4 MG/ML
4 INJECTION, SOLUTION INTRAMUSCULAR; INTRAVENOUS ONCE
Status: COMPLETED | OUTPATIENT
Start: 2020-03-01 | End: 2020-03-01

## 2020-03-01 RX ORDER — ACETAMINOPHEN 650 MG/1
650 SUPPOSITORY RECTAL EVERY 6 HOURS PRN
Status: DISCONTINUED | OUTPATIENT
Start: 2020-03-01 | End: 2020-03-04 | Stop reason: HOSPADM

## 2020-03-01 RX ORDER — SODIUM CHLORIDE 9 MG/ML
INJECTION, SOLUTION INTRAVENOUS CONTINUOUS
Status: DISCONTINUED | OUTPATIENT
Start: 2020-03-02 | End: 2020-03-04 | Stop reason: HOSPADM

## 2020-03-01 RX ORDER — SODIUM CHLORIDE 0.9 % (FLUSH) 0.9 %
10 SYRINGE (ML) INJECTION EVERY 12 HOURS SCHEDULED
Status: DISCONTINUED | OUTPATIENT
Start: 2020-03-02 | End: 2020-03-04 | Stop reason: HOSPADM

## 2020-03-01 RX ORDER — LISINOPRIL 40 MG/1
40 TABLET ORAL DAILY
Status: DISCONTINUED | OUTPATIENT
Start: 2020-03-02 | End: 2020-03-04 | Stop reason: HOSPADM

## 2020-03-01 RX ORDER — ASPIRIN 81 MG/1
324 TABLET, CHEWABLE ORAL ONCE
Status: COMPLETED | OUTPATIENT
Start: 2020-03-01 | End: 2020-03-01

## 2020-03-01 RX ORDER — ONDANSETRON 2 MG/ML
4 INJECTION INTRAMUSCULAR; INTRAVENOUS EVERY 6 HOURS PRN
Status: DISCONTINUED | OUTPATIENT
Start: 2020-03-01 | End: 2020-03-04 | Stop reason: HOSPADM

## 2020-03-01 RX ORDER — NITROGLYCERIN 0.4 MG/1
0.4 TABLET SUBLINGUAL EVERY 5 MIN PRN
Status: DISCONTINUED | OUTPATIENT
Start: 2020-03-01 | End: 2020-03-04 | Stop reason: HOSPADM

## 2020-03-01 RX ORDER — NITROGLYCERIN 0.4 MG/1
0.4 TABLET SUBLINGUAL EVERY 5 MIN PRN
Status: DISCONTINUED | OUTPATIENT
Start: 2020-03-01 | End: 2020-03-01 | Stop reason: SDUPTHER

## 2020-03-01 RX ORDER — NICOTINE POLACRILEX 4 MG
15 LOZENGE BUCCAL PRN
Status: DISCONTINUED | OUTPATIENT
Start: 2020-03-01 | End: 2020-03-04 | Stop reason: HOSPADM

## 2020-03-01 RX ORDER — ACETAMINOPHEN 325 MG/1
650 TABLET ORAL EVERY 6 HOURS PRN
Status: DISCONTINUED | OUTPATIENT
Start: 2020-03-01 | End: 2020-03-04 | Stop reason: HOSPADM

## 2020-03-01 RX ORDER — DEXTROSE MONOHYDRATE 25 G/50ML
12.5 INJECTION, SOLUTION INTRAVENOUS PRN
Status: DISCONTINUED | OUTPATIENT
Start: 2020-03-01 | End: 2020-03-04 | Stop reason: HOSPADM

## 2020-03-01 RX ORDER — PROMETHAZINE HYDROCHLORIDE 12.5 MG/1
12.5 TABLET ORAL EVERY 6 HOURS PRN
Status: DISCONTINUED | OUTPATIENT
Start: 2020-03-01 | End: 2020-03-04 | Stop reason: HOSPADM

## 2020-03-01 RX ADMIN — ASPIRIN 81 MG 324 MG: 81 TABLET ORAL at 20:58

## 2020-03-01 RX ADMIN — Medication 0.4 MG: at 21:09

## 2020-03-01 RX ADMIN — MORPHINE SULFATE 4 MG: 4 INJECTION, SOLUTION INTRAMUSCULAR; INTRAVENOUS at 21:24

## 2020-03-01 RX ADMIN — SODIUM CHLORIDE: 9 INJECTION, SOLUTION INTRAVENOUS at 21:45

## 2020-03-01 RX ADMIN — Medication 0.4 MG: at 20:58

## 2020-03-01 ASSESSMENT — PAIN DESCRIPTION - PAIN TYPE
TYPE: ACUTE PAIN
TYPE: ACUTE PAIN

## 2020-03-01 ASSESSMENT — ENCOUNTER SYMPTOMS
CONSTIPATION: 0
COLOR CHANGE: 0
EYE DISCHARGE: 0
EYE REDNESS: 0
VOMITING: 0
COUGH: 1
SHORTNESS OF BREATH: 0
DIARRHEA: 0
ABDOMINAL PAIN: 0
FACIAL SWELLING: 0

## 2020-03-01 ASSESSMENT — PAIN DESCRIPTION - LOCATION
LOCATION: CHEST

## 2020-03-01 ASSESSMENT — PAIN DESCRIPTION - ORIENTATION: ORIENTATION: MID

## 2020-03-01 ASSESSMENT — PAIN DESCRIPTION - FREQUENCY: FREQUENCY: CONTINUOUS

## 2020-03-01 ASSESSMENT — PAIN SCALES - GENERAL
PAINLEVEL_OUTOF10: 10
PAINLEVEL_OUTOF10: 8
PAINLEVEL_OUTOF10: 6
PAINLEVEL_OUTOF10: 8

## 2020-03-01 ASSESSMENT — PAIN DESCRIPTION - DESCRIPTORS
DESCRIPTORS: PRESSURE;SHARP
DESCRIPTORS: PRESSURE;SHARP
DESCRIPTORS: SHARP;SHOOTING;STABBING

## 2020-03-02 ENCOUNTER — APPOINTMENT (OUTPATIENT)
Dept: ULTRASOUND IMAGING | Age: 55
DRG: 684 | End: 2020-03-02
Payer: COMMERCIAL

## 2020-03-02 ENCOUNTER — APPOINTMENT (OUTPATIENT)
Dept: NUCLEAR MEDICINE | Age: 55
DRG: 684 | End: 2020-03-02
Payer: COMMERCIAL

## 2020-03-02 LAB
-: ABNORMAL
ALBUMIN SERPL-MCNC: 3.6 G/DL (ref 3.5–5.2)
ALBUMIN/GLOBULIN RATIO: ABNORMAL (ref 1–2.5)
ALP BLD-CCNC: 67 U/L (ref 40–129)
ALT SERPL-CCNC: 16 U/L (ref 5–41)
AMORPHOUS: ABNORMAL
ANION GAP SERPL CALCULATED.3IONS-SCNC: 12 MMOL/L (ref 9–17)
ANION GAP SERPL CALCULATED.3IONS-SCNC: 12 MMOL/L (ref 9–17)
AST SERPL-CCNC: 10 U/L
BACTERIA: ABNORMAL
BILIRUB SERPL-MCNC: 0.2 MG/DL (ref 0.3–1.2)
BILIRUBIN URINE: NEGATIVE
BUN BLDV-MCNC: 35 MG/DL (ref 6–20)
BUN BLDV-MCNC: 35 MG/DL (ref 6–20)
BUN/CREAT BLD: 15 (ref 9–20)
BUN/CREAT BLD: 16 (ref 9–20)
CALCIUM SERPL-MCNC: 8.7 MG/DL (ref 8.6–10.4)
CALCIUM SERPL-MCNC: 9.1 MG/DL (ref 8.6–10.4)
CASTS UA: ABNORMAL /LPF
CASTS UA: ABNORMAL /LPF
CHLORIDE BLD-SCNC: 100 MMOL/L (ref 98–107)
CHLORIDE BLD-SCNC: 102 MMOL/L (ref 98–107)
CHOLESTEROL/HDL RATIO: 6.5
CHOLESTEROL: 215 MG/DL
CO2: 20 MMOL/L (ref 20–31)
CO2: 21 MMOL/L (ref 20–31)
COLOR: YELLOW
COMMENT UA: ABNORMAL
CREAT SERPL-MCNC: 2.13 MG/DL (ref 0.7–1.2)
CREAT SERPL-MCNC: 2.33 MG/DL (ref 0.7–1.2)
CRYSTALS, UA: ABNORMAL /HPF
D-DIMER QUANTITATIVE: 1.51 MG/L FEU
EKG ATRIAL RATE: 78 BPM
EKG ATRIAL RATE: 78 BPM
EKG P AXIS: 45 DEGREES
EKG P AXIS: 48 DEGREES
EKG P-R INTERVAL: 180 MS
EKG P-R INTERVAL: 182 MS
EKG Q-T INTERVAL: 360 MS
EKG Q-T INTERVAL: 364 MS
EKG QRS DURATION: 94 MS
EKG QRS DURATION: 96 MS
EKG QTC CALCULATION (BAZETT): 410 MS
EKG QTC CALCULATION (BAZETT): 414 MS
EKG R AXIS: 23 DEGREES
EKG R AXIS: 23 DEGREES
EKG T AXIS: 48 DEGREES
EKG T AXIS: 49 DEGREES
EKG VENTRICULAR RATE: 78 BPM
EKG VENTRICULAR RATE: 78 BPM
EPITHELIAL CELLS UA: ABNORMAL /HPF (ref 0–5)
ESTIMATED AVERAGE GLUCOSE: 229 MG/DL
GFR AFRICAN AMERICAN: 36 ML/MIN
GFR AFRICAN AMERICAN: 40 ML/MIN
GFR NON-AFRICAN AMERICAN: 29 ML/MIN
GFR NON-AFRICAN AMERICAN: 33 ML/MIN
GFR SERPL CREATININE-BSD FRML MDRD: ABNORMAL ML/MIN/{1.73_M2}
GLUCOSE BLD-MCNC: 172 MG/DL (ref 75–110)
GLUCOSE BLD-MCNC: 186 MG/DL (ref 75–110)
GLUCOSE BLD-MCNC: 196 MG/DL (ref 75–110)
GLUCOSE BLD-MCNC: 215 MG/DL (ref 70–99)
GLUCOSE BLD-MCNC: 245 MG/DL (ref 75–110)
GLUCOSE BLD-MCNC: 254 MG/DL (ref 70–99)
GLUCOSE URINE: NEGATIVE
HBA1C MFR BLD: 9.6 % (ref 4–6)
HCT VFR BLD CALC: 34.1 % (ref 40.7–50.3)
HDLC SERPL-MCNC: 33 MG/DL
HEMOGLOBIN: 11.3 G/DL (ref 13–17)
INR BLD: 1.1
KETONES, URINE: NEGATIVE
LDL CHOLESTEROL: ABNORMAL MG/DL (ref 0–130)
LEUKOCYTE ESTERASE, URINE: NEGATIVE
LV EF: 46 %
LVEF MODALITY: NORMAL
MAGNESIUM: 1.4 MG/DL (ref 1.6–2.6)
MAGNESIUM: 1.8 MG/DL (ref 1.6–2.6)
MCH RBC QN AUTO: 28.5 PG (ref 25.2–33.5)
MCHC RBC AUTO-ENTMCNC: 33.1 G/DL (ref 28.4–34.8)
MCV RBC AUTO: 86.1 FL (ref 82.6–102.9)
MUCUS: ABNORMAL
NITRITE, URINE: NEGATIVE
NRBC AUTOMATED: 0 PER 100 WBC
OTHER OBSERVATIONS UA: ABNORMAL
PDW BLD-RTO: 13.6 % (ref 11.8–14.4)
PH UA: 6 (ref 5–8)
PLATELET # BLD: 205 K/UL (ref 138–453)
PMV BLD AUTO: 9.5 FL (ref 8.1–13.5)
POTASSIUM SERPL-SCNC: 4.3 MMOL/L (ref 3.7–5.3)
POTASSIUM SERPL-SCNC: 4.5 MMOL/L (ref 3.7–5.3)
PROTEIN UA: ABNORMAL
PROTHROMBIN TIME: 11.3 SEC (ref 9.7–11.6)
RBC # BLD: 3.96 M/UL (ref 4.21–5.77)
RBC UA: ABNORMAL /HPF (ref 0–2)
RENAL EPITHELIAL, UA: ABNORMAL /HPF
SODIUM BLD-SCNC: 133 MMOL/L (ref 135–144)
SODIUM BLD-SCNC: 134 MMOL/L (ref 135–144)
SODIUM,UR: 109 MMOL/L
SPECIFIC GRAVITY UA: 1.02 (ref 1–1.03)
TOTAL PROTEIN, URINE: 136 MG/DL
TOTAL PROTEIN: 6.5 G/DL (ref 6.4–8.3)
TRICHOMONAS: ABNORMAL
TRIGL SERPL-MCNC: 551 MG/DL
TROPONIN INTERP: NORMAL
TROPONIN T: NORMAL NG/ML
TROPONIN, HIGH SENSITIVITY: 13 NG/L (ref 0–22)
TURBIDITY: CLEAR
URINE HGB: NEGATIVE
UROBILINOGEN, URINE: NORMAL
VLDLC SERPL CALC-MCNC: ABNORMAL MG/DL (ref 1–30)
WBC # BLD: 7.6 K/UL (ref 3.5–11.3)
WBC UA: ABNORMAL /HPF (ref 0–5)
YEAST: ABNORMAL

## 2020-03-02 PROCEDURE — 2580000003 HC RX 258: Performed by: NURSE PRACTITIONER

## 2020-03-02 PROCEDURE — 82947 ASSAY GLUCOSE BLOOD QUANT: CPT

## 2020-03-02 PROCEDURE — 84484 ASSAY OF TROPONIN QUANT: CPT

## 2020-03-02 PROCEDURE — 85379 FIBRIN DEGRADATION QUANT: CPT

## 2020-03-02 PROCEDURE — 93010 ELECTROCARDIOGRAM REPORT: CPT | Performed by: INTERNAL MEDICINE

## 2020-03-02 PROCEDURE — 83735 ASSAY OF MAGNESIUM: CPT

## 2020-03-02 PROCEDURE — 83721 ASSAY OF BLOOD LIPOPROTEIN: CPT

## 2020-03-02 PROCEDURE — 83036 HEMOGLOBIN GLYCOSYLATED A1C: CPT

## 2020-03-02 PROCEDURE — 99232 SBSQ HOSP IP/OBS MODERATE 35: CPT | Performed by: INTERNAL MEDICINE

## 2020-03-02 PROCEDURE — 78452 HT MUSCLE IMAGE SPECT MULT: CPT

## 2020-03-02 PROCEDURE — 6360000002 HC RX W HCPCS: Performed by: NURSE PRACTITIONER

## 2020-03-02 PROCEDURE — 80053 COMPREHEN METABOLIC PANEL: CPT

## 2020-03-02 PROCEDURE — 85027 COMPLETE CBC AUTOMATED: CPT

## 2020-03-02 PROCEDURE — 76770 US EXAM ABDO BACK WALL COMP: CPT

## 2020-03-02 PROCEDURE — 80061 LIPID PANEL: CPT

## 2020-03-02 PROCEDURE — 93017 CV STRESS TEST TRACING ONLY: CPT

## 2020-03-02 PROCEDURE — 6370000000 HC RX 637 (ALT 250 FOR IP): Performed by: NURSE PRACTITIONER

## 2020-03-02 PROCEDURE — 1200000000 HC SEMI PRIVATE

## 2020-03-02 PROCEDURE — A9500 TC99M SESTAMIBI: HCPCS | Performed by: INTERNAL MEDICINE

## 2020-03-02 PROCEDURE — 80048 BASIC METABOLIC PNL TOTAL CA: CPT

## 2020-03-02 PROCEDURE — 3430000000 HC RX DIAGNOSTIC RADIOPHARMACEUTICAL: Performed by: INTERNAL MEDICINE

## 2020-03-02 PROCEDURE — 36415 COLL VENOUS BLD VENIPUNCTURE: CPT

## 2020-03-02 PROCEDURE — 85610 PROTHROMBIN TIME: CPT

## 2020-03-02 RX ORDER — METOPROLOL TARTRATE 5 MG/5ML
5 INJECTION INTRAVENOUS EVERY 5 MIN PRN
Status: CANCELLED | OUTPATIENT
Start: 2020-03-02 | End: 2020-03-02

## 2020-03-02 RX ORDER — MAGNESIUM SULFATE 1 G/100ML
1 INJECTION INTRAVENOUS PRN
Status: DISCONTINUED | OUTPATIENT
Start: 2020-03-02 | End: 2020-03-04 | Stop reason: HOSPADM

## 2020-03-02 RX ORDER — SODIUM CHLORIDE 0.9 % (FLUSH) 0.9 %
10 SYRINGE (ML) INJECTION PRN
Status: CANCELLED | OUTPATIENT
Start: 2020-03-02 | End: 2020-03-02

## 2020-03-02 RX ORDER — ATROPINE SULFATE 0.1 MG/ML
0.5 INJECTION INTRAVENOUS EVERY 5 MIN PRN
Status: CANCELLED | OUTPATIENT
Start: 2020-03-02 | End: 2020-03-02

## 2020-03-02 RX ORDER — HYDRALAZINE HYDROCHLORIDE 20 MG/ML
10 INJECTION INTRAMUSCULAR; INTRAVENOUS EVERY 6 HOURS PRN
Status: DISCONTINUED | OUTPATIENT
Start: 2020-03-02 | End: 2020-03-03

## 2020-03-02 RX ORDER — NITROGLYCERIN 0.4 MG/1
0.4 TABLET SUBLINGUAL EVERY 5 MIN PRN
Status: CANCELLED | OUTPATIENT
Start: 2020-03-02 | End: 2020-03-02

## 2020-03-02 RX ORDER — HYDROCODONE BITARTRATE AND ACETAMINOPHEN 5; 325 MG/1; MG/1
2 TABLET ORAL EVERY 4 HOURS PRN
Status: DISCONTINUED | OUTPATIENT
Start: 2020-03-02 | End: 2020-03-04 | Stop reason: HOSPADM

## 2020-03-02 RX ORDER — HYDROCODONE BITARTRATE AND ACETAMINOPHEN 5; 325 MG/1; MG/1
1 TABLET ORAL EVERY 4 HOURS PRN
Status: DISCONTINUED | OUTPATIENT
Start: 2020-03-02 | End: 2020-03-04 | Stop reason: HOSPADM

## 2020-03-02 RX ORDER — SODIUM CHLORIDE 9 MG/ML
500 INJECTION, SOLUTION INTRAVENOUS CONTINUOUS PRN
Status: CANCELLED | OUTPATIENT
Start: 2020-03-02 | End: 2020-03-02

## 2020-03-02 RX ORDER — ALBUTEROL SULFATE 90 UG/1
2 AEROSOL, METERED RESPIRATORY (INHALATION) PRN
Status: CANCELLED | OUTPATIENT
Start: 2020-03-02 | End: 2020-03-02

## 2020-03-02 RX ADMIN — INSULIN LISPRO 2 UNITS: 100 INJECTION, SOLUTION INTRAVENOUS; SUBCUTANEOUS at 16:42

## 2020-03-02 RX ADMIN — HEPARIN SODIUM 5000 UNITS: 5000 INJECTION INTRAVENOUS; SUBCUTANEOUS at 13:56

## 2020-03-02 RX ADMIN — INSULIN LISPRO 2 UNITS: 100 INJECTION, SOLUTION INTRAVENOUS; SUBCUTANEOUS at 12:05

## 2020-03-02 RX ADMIN — MAGNESIUM SULFATE HEPTAHYDRATE 1 G: 1 INJECTION, SOLUTION INTRAVENOUS at 05:37

## 2020-03-02 RX ADMIN — INSULIN LISPRO 1 UNITS: 100 INJECTION, SOLUTION INTRAVENOUS; SUBCUTANEOUS at 21:29

## 2020-03-02 RX ADMIN — Medication 325 MG: at 09:35

## 2020-03-02 RX ADMIN — ATORVASTATIN CALCIUM 40 MG: 40 TABLET, FILM COATED ORAL at 01:06

## 2020-03-02 RX ADMIN — HYDRALAZINE HYDROCHLORIDE 10 MG: 20 INJECTION INTRAMUSCULAR; INTRAVENOUS at 23:38

## 2020-03-02 RX ADMIN — INSULIN GLARGINE 15 UNITS: 100 INJECTION, SOLUTION SUBCUTANEOUS at 21:29

## 2020-03-02 RX ADMIN — INSULIN GLARGINE 15 UNITS: 100 INJECTION, SOLUTION SUBCUTANEOUS at 09:34

## 2020-03-02 RX ADMIN — SODIUM CHLORIDE: 9 INJECTION, SOLUTION INTRAVENOUS at 23:39

## 2020-03-02 RX ADMIN — HEPARIN SODIUM 5000 UNITS: 5000 INJECTION INTRAVENOUS; SUBCUTANEOUS at 05:25

## 2020-03-02 RX ADMIN — TETRAKIS(2-METHOXYISOBUTYLISOCYANIDE)COPPER(I) TETRAFLUOROBORATE 19.8 MILLICURIE: 1 INJECTION, POWDER, LYOPHILIZED, FOR SOLUTION INTRAVENOUS at 08:30

## 2020-03-02 RX ADMIN — MAGNESIUM SULFATE HEPTAHYDRATE 1 G: 1 INJECTION, SOLUTION INTRAVENOUS at 06:40

## 2020-03-02 RX ADMIN — ATORVASTATIN CALCIUM 40 MG: 40 TABLET, FILM COATED ORAL at 21:30

## 2020-03-02 RX ADMIN — HYDROCODONE BITARTRATE AND ACETAMINOPHEN 2 TABLET: 5; 325 TABLET ORAL at 01:06

## 2020-03-02 RX ADMIN — TETRAKIS(2-METHOXYISOBUTYLISOCYANIDE)COPPER(I) TETRAFLUOROBORATE 40 MILLICURIE: 1 INJECTION, POWDER, LYOPHILIZED, FOR SOLUTION INTRAVENOUS at 11:55

## 2020-03-02 RX ADMIN — ACETAMINOPHEN 650 MG: 325 TABLET ORAL at 12:15

## 2020-03-02 RX ADMIN — SODIUM CHLORIDE: 9 INJECTION, SOLUTION INTRAVENOUS at 12:15

## 2020-03-02 RX ADMIN — INSULIN LISPRO 4 UNITS: 100 INJECTION, SOLUTION INTRAVENOUS; SUBCUTANEOUS at 09:34

## 2020-03-02 RX ADMIN — HEPARIN SODIUM 5000 UNITS: 5000 INJECTION INTRAVENOUS; SUBCUTANEOUS at 21:30

## 2020-03-02 ASSESSMENT — PAIN DESCRIPTION - LOCATION
LOCATION: CHEST
LOCATION: HEAD

## 2020-03-02 ASSESSMENT — PAIN DESCRIPTION - PAIN TYPE
TYPE: ACUTE PAIN
TYPE: ACUTE PAIN

## 2020-03-02 ASSESSMENT — PAIN DESCRIPTION - FREQUENCY: FREQUENCY: CONTINUOUS

## 2020-03-02 ASSESSMENT — PAIN DESCRIPTION - ONSET: ONSET: ON-GOING

## 2020-03-02 ASSESSMENT — PAIN - FUNCTIONAL ASSESSMENT: PAIN_FUNCTIONAL_ASSESSMENT: ACTIVITIES ARE NOT PREVENTED

## 2020-03-02 ASSESSMENT — PAIN SCALES - GENERAL
PAINLEVEL_OUTOF10: 0
PAINLEVEL_OUTOF10: 3
PAINLEVEL_OUTOF10: 4
PAINLEVEL_OUTOF10: 0
PAINLEVEL_OUTOF10: 7
PAINLEVEL_OUTOF10: 0

## 2020-03-02 ASSESSMENT — ENCOUNTER SYMPTOMS
SHORTNESS OF BREATH: 0
BLOOD IN STOOL: 0
NAUSEA: 0
ABDOMINAL PAIN: 0
COUGH: 0
WHEEZING: 0
VOMITING: 0
DIARRHEA: 0
CONSTIPATION: 0

## 2020-03-02 ASSESSMENT — PAIN DESCRIPTION - DESCRIPTORS: DESCRIPTORS: PRESSURE;DISCOMFORT

## 2020-03-02 ASSESSMENT — PAIN DESCRIPTION - ORIENTATION: ORIENTATION: MID

## 2020-03-02 NOTE — PLAN OF CARE
Problem: Pain:  Goal: Pain level will decrease  Description  Pain level will decrease  3/2/2020 1211 by Julio Holden RN  Outcome: Ongoing  3/2/2020 0131 by Margot Riggs RN  Outcome: Ongoing  Goal: Control of acute pain  Description  Control of acute pain  3/2/2020 1211 by Julio Holden RN  Outcome: Ongoing  3/2/2020 0131 by Margot Riggs RN  Outcome: Ongoing  Note:   Pain level assessment complete. Patient educated on pain scale and control interventions  PRN pain medication given per patient request  Patient instructed to call out with new onset of pain or unrelieved pain   Goal: Control of chronic pain  Description  Control of chronic pain  3/2/2020 1211 by Julio Holden RN  Outcome: Ongoing  3/2/2020 0131 by Margot Riggs RN  Outcome: Ongoing   Pt medicated with pain medication prn. Assessed all pain characteristics including level, type, location, frequency, and onset. Non-pharmacologic interventions offered to pt as well. Pt states pain is tolerable at this time.  Will continue to monitor

## 2020-03-02 NOTE — PROCEDURES
[address]                 [address] [facility], [address] [place] [address]                              CARDIAC STRESS TEST    PATIENT NAME: Phu Porter                     :        1965  MED REC NO:   [id number]                             ROOM:         ACCOUNT NO:   [de-identified]                           ADMIT DATE: 2020  PROVIDER:     Irvin Herbert    DATE OF STUDY:  2020    TREADMILL MYOVIEW STRESS TEST    ATTENDING PROVIDER:   Yeni Chou MD    PRIMARY CARE PROVIDER:  Isabell Aguilar MD    PERFORMING PHYSICIAN: Irvin Herbert MD    INDICATION:  Chest pain. HEART RATE  100% max predicted heart rate:  167  85% max predicted heart rate:  142  Duration:  6:59    Resting heart rate:  70  Maximum heart rate achieved:  146  % of predicted maximum:  87%    BLOOD PRESSURE  Resting BP:  145/78  Peak BP:  198/67  Peak double product:  28,908  METS:  8.40    REASON FOR TERMINATION:  85% Maximum predicted heart rate achieved. BASELINE EKG DEMONSTRATED:  Sinus rhythm. Incomplete right bundle branch block. During the stress test, the patient reported:  No symptoms. STRESS EKG DEMONSTRATED:  No abnormal change. HEART RATE RESPONSE:   Normal response. BLOOD PRESSURE RESPONSE:   Normal response. Functional capacity is:  Average. The Duke treadmill score is +7 (specific only to the exercise findings). Formula score:  minutes - 5*() - 4* ()    Montgomery score for this test is:  Low risk (> to 5). EKG IMPRESSION:  Negative. FINAL IMPRESSION:  Negative.     COMMENTS:  Normal EKG portion of exercise stress test.        CRISTIAN ARGUETA    D: 2020 9:45:01       T: 2020 9:48:22     NLEI/POLLY_KATELYNIT  Job#: Hali Long     Doc#: Unknown

## 2020-03-02 NOTE — H&P
[facility]    HISTORY AND PHYSICAL EXAMINATION            Date:   3/1/2020  Patient name:  Adore Villa  Date of admission:  3/1/2020  8:38 PM  MRN:   [id number]  Account:  [de-identified]  YOB: 1965  PCP:    Chance Long MD  Room:   2003/2003-02  Code Status:    Prior    Chief Complaint:     Chief Complaint   Patient presents with    Chest Pain     increasing past 2 days     History Obtained From:     Patient and electronic medical record. History of Present Illness:     Adore Villa is a 49 y.o. Non-/non  male who presents with Chest Pain (increasing past 2 days)   and is admitted to the hospital for the management of Acute kidney injury superimposed on chronic kidney disease ([facility] [address] The patient states that he began experiencing constant chest pain approximately 4 days ago. He states his pain has been progressive and worsened today. He describes his pain as dull, aching with orientation to his left chest. His pain does not radiate. He states it is moderate to severe. He denies diaphoresis or shortness of breath. He states he did get minimal relief with aspirin and morphine administered in the ER. He discloses he recently had a kidney biopsy at the [facility] in [facility]. He is noted to have chronic kidney disease with a creatinine of ~1.4 since 2018. He states he does take ibuprofen occasionally for pain. He has past medical history that includes hypertension and diabetes. Creatinine 2.26, BUN 33. CO2 19, anion gap13. High sensitivity troponin 14, 14. WBC 7, hbg 12, hct 36.3. UA negative. CXR negative for acute cardiopulmonary process.      Past Medical History:     Past Medical History:   Diagnosis Date    CKD (chronic kidney disease)     Diabetes mellitus ([facility] [address]     Headache     Hypertension         Past Surgical History:     Past Surgical History:   Procedure Laterality Date    ROTATOR CUFF REPAIR      ROTATOR CUFF REPAIR Right         Medications Prior to Admission:     Prior to Admission medications    Medication Sig Start Date End Date Taking? Authorizing Provider   nortriptyline (PAMELOR) 50 MG capsule  5/14/19   Historical Provider, MD   ondansetron (ZOFRAN ODT) 4 MG disintegrating tablet Take 1 tablet by mouth every 8 hours as needed for Nausea 3/16/19   Nikki Whitley MD   butalbital-APAP-caffeine (FIORICET) -40 MG CAPS per capsule Take 1 capsule by mouth every 4 hours as needed for Headaches or Migraine 3/16/19   Nikki Whitley MD   SUMAtriptan (IMITREX) 25 MG tablet Take 25 mg by mouth once as needed for Migraine    Historical Provider, MD   lisinopril (PRINIVIL) 20 MG tablet Take 1 tablet by mouth daily  Patient taking differently: Take 40 mg by mouth daily  1/3/18   ROBERT Khalil CNP   glipiZIDE (GLUCOTROL) 10 MG tablet Take 10 mg by mouth daily    Historical Provider, MD   glimepiride (AMARYL) 1 MG tablet Take 1 mg by mouth every morning (before breakfast)    Historical Provider, MD   ibuprofen (ADVIL;MOTRIN) 600 MG tablet Take 1 tablet by mouth every 8 hours as needed for Pain 12/15/16   Sariah Shabazz MD   nystatin (MYCOSTATIN) 145834 UNIT/GM powder Apply to left groin BID 11/7/15   Lissy Lilly MD   insulin glargine (LANTUS) 100 UNIT/ML injection vial Inject 15 Units into the skin 2 times daily. Historical Provider, MD        Allergies:     Patient has no known allergies. Social History:     Tobacco:    reports that he has never smoked. He has never used smokeless tobacco.  Alcohol:      reports no history of alcohol use. Drug Use:  reports no history of drug use. Family History:     Family History   Problem Relation Age of Onset    Heart Disease Mother     Cancer Mother        Review of Systems:     Positive and Negative as described in HPI. Review of Systems   Constitutional: Negative for chills, diaphoresis and fever.    HENT: Negative for congestion. Eyes: Negative for visual disturbance. Respiratory: Negative for cough, shortness of breath and wheezing. Cardiovascular: Positive for chest pain. Negative for palpitations and leg swelling. Gastrointestinal: Negative for abdominal pain, blood in stool, constipation, diarrhea, nausea and vomiting. Endocrine: Negative for cold intolerance and heat intolerance. Genitourinary: Negative for difficulty urinating, dysuria, flank pain, frequency and urgency. Musculoskeletal: Negative for arthralgias and myalgias. Neurological: Negative for dizziness, weakness, light-headedness, numbness and headaches. Hematological: Does not bruise/bleed easily. Psychiatric/Behavioral: The patient is not nervous/anxious. All other systems reviewed and are negative. Physical Exam:   BP (!) 157/107   Pulse 65   Temp 98.7 °F (37.1 °C) (Oral)   Resp 18   Ht 5' 9\" (1.753 m)   Wt 234 lb (106.1 kg)   SpO2 97%   BMI 34.56 kg/m²   Temp (24hrs), Av.7 °F (37.1 °C), Min:98.7 °F (37.1 °C), Max:98.7 °F (37.1 °C)    No results for input(s): POCGLU in the last 72 hours. No intake or output data in the 24 hours ending 20 [id number]    Physical Exam  Vitals signs and nursing note reviewed. Constitutional:       General: He is not in acute distress. Appearance: He is obese. He is not diaphoretic. HENT:      Head: Normocephalic and atraumatic. Right Ear: Hearing normal.      Left Ear: Hearing normal.      Nose: Nose normal. No rhinorrhea. Eyes:      General: Lids are normal.      Extraocular Movements:      Right eye: Normal extraocular motion. Left eye: Normal extraocular motion. Conjunctiva/sclera: Conjunctivae normal.      Right eye: Right conjunctiva is not injected. Left eye: Left conjunctiva is not injected. Pupils: Pupils are equal, round, and reactive to light. Pupils are equal.      Right eye: Pupil is reactive. Left eye: Pupil is reactive.    Neck: Musculoskeletal: Neck supple. Thyroid: No thyromegaly. Vascular: No carotid bruit. Trachea: Trachea and phonation normal. No tracheal deviation. Cardiovascular:      Rate and Rhythm: Normal rate and regular rhythm. Pulses: Normal pulses. Heart sounds: Normal heart sounds. No murmur. Pulmonary:      Effort: Pulmonary effort is normal. No respiratory distress. Breath sounds: Normal breath sounds. No stridor. No decreased breath sounds. Abdominal:      General: Bowel sounds are normal. There is no distension. Palpations: Abdomen is soft. There is no mass. Tenderness: There is no abdominal tenderness. There is no guarding. Musculoskeletal:         General: No tenderness. Skin:     General: Skin is warm and dry. Findings: No erythema, lesion or rash. Neurological:      Mental Status: He is alert and oriented to person, place, and time. He is not disoriented. Cranial Nerves: No cranial nerve deficit. Psychiatric:         Speech: Speech normal.         Behavior: Behavior normal. Behavior is cooperative.          Investigations:      Laboratory Testing:  Recent Results (from the past 24 hour(s))   Basic Metabolic Panel    Collection Time: 03/01/20  8:50 PM   Result Value Ref Range    Glucose 183 (H) 70 - 99 mg/dL    BUN 33 (H) 6 - 20 mg/dL    CREATININE 2.26 (H) 0.70 - 1.20 mg/dL    Bun/Cre Ratio 15 9 - 20    Calcium 9.9 8.6 - 10.4 mg/dL    Sodium 133 (L) 135 - 144 mmol/L    Potassium 4.1 3.7 - 5.3 mmol/L    Chloride 101 98 - 107 mmol/L    CO2 19 (L) 20 - 31 mmol/L    Anion Gap 13 9 - 17 mmol/L    GFR Non-African American 31 (L) >60 mL/min    GFR  37 (L) >60 mL/min    GFR Comment          GFR Staging NOT REPORTED    CBC Auto Differential    Collection Time: 03/01/20  8:50 PM   Result Value Ref Range    WBC 7.0 3.5 - 11.3 k/uL    RBC 4.25 4.21 - 5.77 m/uL    Hemoglobin 12.0 (L) 13.0 - 17.0 g/dL    Hematocrit 36.3 (L) 40.7 - 50.3 %    MCV 85.4 82.6 - 102.9 fL    MCH 28.2 25.2 - 33.5 pg    MCHC 33.1 28.4 - 34.8 g/dL    RDW 13.5 11.8 - 14.4 %    Platelets 426 853 - 016 k/uL    MPV 9.5 8.1 - 13.5 fL    NRBC Automated 0.0 0.0 per 100 WBC    Differential Type NOT REPORTED     Seg Neutrophils 65 36 - 65 %    Lymphocytes 21 (L) 24 - 43 %    Monocytes 7 3 - 12 %    Eosinophils % 6 (H) 1 - 4 %    Basophils 1 0 - 2 %    Immature Granulocytes 0 0 %    Segs Absolute 4.49 1.50 - 8.10 k/uL    Absolute Lymph # 1.47 1.10 - 3.70 k/uL    Absolute Mono # 0.51 0.10 - 1.20 k/uL    Absolute Eos # 0.44 0.00 - 0.44 k/uL    Basophils Absolute 0.04 0.00 - 0.20 k/uL    Absolute Immature Granulocyte 0.03 0.00 - 0.30 k/uL    WBC Morphology NOT REPORTED     RBC Morphology NOT REPORTED     Platelet Estimate NOT REPORTED    Protime-INR    Collection Time: 03/01/20  8:50 PM   Result Value Ref Range    Protime 10.9 9.7 - 11.6 sec    INR 1.1    APTT    Collection Time: 03/01/20  8:50 PM   Result Value Ref Range    PTT 24.7 23 - 31 sec   TROP/MYOGLOBIN    Collection Time: 03/01/20  8:50 PM   Result Value Ref Range    Troponin, High Sensitivity 14 0 - 22 ng/L    Troponin T NOT REPORTED <0.03 ng/mL    Troponin Interp NOT REPORTED     Myoglobin 91 (H) 28 - 72 ng/mL   TROP/MYOGLOBIN    Collection Time: 03/01/20 11:09 PM   Result Value Ref Range    Troponin, High Sensitivity 14 0 - 22 ng/L    Troponin T NOT REPORTED <0.03 ng/mL    Troponin Interp NOT REPORTED     Myoglobin 98 (H) 28 - 72 ng/mL       Imaging/Diagnostics:  Xr Chest Portable    Result Date: 3/1/2020  No acute cardiopulmonary abnormality.        Assessment :      Hospital Problems           Last Modified POA    * (Principal) Acute kidney injury superimposed on chronic kidney disease ([facility] [address] 3/1/2020 Yes    Type 2 diabetes mellitus with stage 3 chronic kidney disease, with long-term current use of insulin ([facility] [address] 3/1/2020 Yes    Stage 3 chronic kidney disease ([facility] [address] 3/1/2020 Yes    Chest pain at rest 3/1/2020 Yes    Essential hypertension

## 2020-03-02 NOTE — PROGRESS NOTES
mg Oral Daily    sodium chloride flush  10 mL Intravenous 2 times per day    insulin lispro  0-12 Units Subcutaneous TID WC    insulin lispro  0-6 Units Subcutaneous Nightly    heparin (porcine)  5,000 Units Subcutaneous 3 times per day     Continuous Infusions:    dextrose      sodium chloride       PRN Meds: HYDROcodone 5 mg - acetaminophen **OR** HYDROcodone 5 mg - acetaminophen, magnesium sulfate, hydrALAZINE, technetium sestamibi, acetaminophen **OR** acetaminophen, magnesium hydroxide, promethazine **OR** ondansetron, nicotine, glucose, dextrose, glucagon (rDNA), dextrose, nitroGLYCERIN, sodium chloride flush    Data:     Past Medical History:   has a past medical history of CKD (chronic kidney disease), Diabetes mellitus ([address] Headache, and Hypertension. Social History:   reports that he has never smoked. He has never used smokeless tobacco. He reports current alcohol use. He reports that he does not use drugs. Family History:   Family History   Problem Relation Age of Onset    Heart Disease Mother     Cancer Mother        Vitals:  BP (!) 145/78   Pulse 78   Temp 98.4 °F (36.9 °C) (Oral)   Resp 16   Ht 5' 9\" (1.753 m)   Wt 241 lb (109.3 kg)   SpO2 96%   BMI 35.59 kg/m²   Temp (24hrs), Av.4 °F (36.9 °C), Min:98.2 °F (36.8 °C), Max:98.7 °F (37.1 °C)    Recent Labs     20  0607   POCGLU 245*       I/O (24Hr):     Intake/Output Summary (Last 24 hours) at 3/2/2020 1102  Last data filed at 3/2/2020 0515  Gross per 24 hour   Intake 1051 ml   Output --   Net 1051 ml       Labs:  Hematology:  Recent Labs     20  2050 20  0320   WBC 7.0 7.6   RBC 4.25 3.96*   HGB 12.0* 11.3*   HCT 36.3* 34.1*   MCV 85.4 86.1   MCH 28.2 28.5   MCHC 33.1 33.1   RDW 13.5 13.6    205   MPV 9.5 9.5   INR 1.1 1.1     Chemistry:  Recent Labs     20  0320   *  --  133*   K 4.1  --  4.3     --  100   CO2 19*  --  21   GLUCOSE 183*  --  254*   BUN Last Modified POA    * (Principal) Atypical chest pain 3/2/2020 Yes    Type 2 diabetes mellitus with stage 3 chronic kidney disease, with long-term current use of insulin ([facility] [address] 3/1/2020 Yes    Acute kidney injury superimposed on chronic kidney disease ([facility] [address] 3/2/2020 Yes    Stage 3 chronic kidney disease ([facility] [address] 3/1/2020 Yes    Essential hypertension 3/1/2020 Yes    Class 2 severe obesity due to excess calories with serious comorbidity and body mass index (BMI) of 35.0 to 35.9 in adult [facility]) 3/2/2020 Yes          Plan:        - Labs and imaging reviewed, medications reviewed  - Check pain likely msk/pleuritic - r/o cardiac/respiratory cause   - Check stress test  - Check D-Dimer given family hx of clots and pain worse with inspiration, VQ if positive  - Monitor renal function - hx baseline CKD  - Continue home medications  - Continue IVF  - Pain control      Maira Brooks MD  3/2/2020  11:02 AM

## 2020-03-02 NOTE — CARE COORDINATION
Case Management Initial Discharge Plan  Hayes Brock,             Met with:patient or spouse/SO to discuss discharge plans. Information verified: address, contacts, phone number, , insurance Yes  PCP: Zheng Walter MD  Date of last visit:     Insurance Provider: [address]    Discharge Planning    Living Arrangements:  Spouse/Significant Other, children   Support Systems:  Spouse/Significant Other    Home has 1 stories  0 stairs to climb to get into front door, 0 stairs to climb to reach second floor  Location of bedroom/bathroom in home  - main floor    Patient able to perform ADL's:Independent    Current Services (outpatient & in home) none  DME equipment: glucometer  DME provider: N/A    [place]     Potential Assistance Needed:  N/A    Patient agreeable to home care: No  [place] of choice provided:  n/a    Prior SNF/Rehab Placement and Facility: No  Agreeable to SNF/Rehab: No  [place] of choice provided: n/a   Evaluation: n/a    Expected Discharge date:  20  Patient expects to be discharged to:  home  Follow Up Appointment: Best Day/ Time:      Transportation provider: wife  Transportation arrangements needed for discharge: No    Readmission Risk              Risk of Unplanned Readmission:        9             Does patient have a readmission risk score greater than 14?: No  If yes, follow-up appointment must be made within 7 days of discharge. Goal of Care:       Discharge Plan: Met with patient and wife at bedside. Lives with wife and 2 children, independent and works full time. Admitted for chest pain that started Thursday and progressively worsened. Follows with nephrologist at [facility] for CKD. Stress test and renal US done today. Continue to follow and no home needs anticipated. Attempted to make hospital F/U appointment with Dr. Skye Avilez at [facility] and told to have pt call when discharged.  Pt informed.           Electronically signed by Dwight Forde RN on 3/2/20 at 2:34 PM

## 2020-03-03 LAB
ANION GAP SERPL CALCULATED.3IONS-SCNC: 12 MMOL/L (ref 9–17)
BUN BLDV-MCNC: 31 MG/DL (ref 6–20)
BUN/CREAT BLD: 18 (ref 9–20)
CALCIUM SERPL-MCNC: 8.5 MG/DL (ref 8.6–10.4)
CHLORIDE BLD-SCNC: 105 MMOL/L (ref 98–107)
CO2: 18 MMOL/L (ref 20–31)
CREAT SERPL-MCNC: 1.69 MG/DL (ref 0.7–1.2)
GFR AFRICAN AMERICAN: 52 ML/MIN
GFR NON-AFRICAN AMERICAN: 43 ML/MIN
GFR SERPL CREATININE-BSD FRML MDRD: ABNORMAL ML/MIN/{1.73_M2}
GFR SERPL CREATININE-BSD FRML MDRD: ABNORMAL ML/MIN/{1.73_M2}
GLUCOSE BLD-MCNC: 137 MG/DL (ref 75–110)
GLUCOSE BLD-MCNC: 187 MG/DL (ref 70–99)
GLUCOSE BLD-MCNC: 212 MG/DL (ref 75–110)
HCT VFR BLD CALC: 33.6 % (ref 40.7–50.3)
HEMOGLOBIN: 11.1 G/DL (ref 13–17)
LDL CHOLESTEROL DIRECT: 99 MG/DL
MAGNESIUM: 1.7 MG/DL (ref 1.6–2.6)
MCH RBC QN AUTO: 28.2 PG (ref 25.2–33.5)
MCHC RBC AUTO-ENTMCNC: 33 G/DL (ref 28.4–34.8)
MCV RBC AUTO: 85.3 FL (ref 82.6–102.9)
NRBC AUTOMATED: 0 PER 100 WBC
PDW BLD-RTO: 13.2 % (ref 11.8–14.4)
PLATELET # BLD: 206 K/UL (ref 138–453)
PMV BLD AUTO: 9.6 FL (ref 8.1–13.5)
POTASSIUM SERPL-SCNC: 4.1 MMOL/L (ref 3.7–5.3)
RBC # BLD: 3.94 M/UL (ref 4.21–5.77)
SODIUM BLD-SCNC: 135 MMOL/L (ref 135–144)
WBC # BLD: 5.7 K/UL (ref 3.5–11.3)

## 2020-03-03 PROCEDURE — 99232 SBSQ HOSP IP/OBS MODERATE 35: CPT | Performed by: INTERNAL MEDICINE

## 2020-03-03 PROCEDURE — 6370000000 HC RX 637 (ALT 250 FOR IP): Performed by: INTERNAL MEDICINE

## 2020-03-03 PROCEDURE — 6360000002 HC RX W HCPCS: Performed by: NURSE PRACTITIONER

## 2020-03-03 PROCEDURE — 85027 COMPLETE CBC AUTOMATED: CPT

## 2020-03-03 PROCEDURE — 36415 COLL VENOUS BLD VENIPUNCTURE: CPT

## 2020-03-03 PROCEDURE — 82947 ASSAY GLUCOSE BLOOD QUANT: CPT

## 2020-03-03 PROCEDURE — 80048 BASIC METABOLIC PNL TOTAL CA: CPT

## 2020-03-03 PROCEDURE — 2500000003 HC RX 250 WO HCPCS: Performed by: NURSE PRACTITIONER

## 2020-03-03 PROCEDURE — 83735 ASSAY OF MAGNESIUM: CPT

## 2020-03-03 PROCEDURE — 6370000000 HC RX 637 (ALT 250 FOR IP): Performed by: NURSE PRACTITIONER

## 2020-03-03 PROCEDURE — 1200000000 HC SEMI PRIVATE

## 2020-03-03 RX ORDER — TOPIRAMATE 50 MG/1
50 TABLET, FILM COATED ORAL 2 TIMES DAILY
COMMUNITY

## 2020-03-03 RX ORDER — ROSUVASTATIN CALCIUM 10 MG/1
10 TABLET, COATED ORAL DAILY
COMMUNITY

## 2020-03-03 RX ORDER — METOPROLOL TARTRATE 5 MG/5ML
5 INJECTION INTRAVENOUS EVERY 4 HOURS PRN
Status: DISCONTINUED | OUTPATIENT
Start: 2020-03-03 | End: 2020-03-04 | Stop reason: HOSPADM

## 2020-03-03 RX ORDER — VERAPAMIL HYDROCHLORIDE 240 MG/1
240 TABLET, FILM COATED, EXTENDED RELEASE ORAL NIGHTLY
Status: ON HOLD | COMMUNITY
End: 2020-03-04 | Stop reason: HOSPADM

## 2020-03-03 RX ORDER — AMLODIPINE BESYLATE 5 MG/1
5 TABLET ORAL DAILY
Status: DISCONTINUED | OUTPATIENT
Start: 2020-03-03 | End: 2020-03-04 | Stop reason: HOSPADM

## 2020-03-03 RX ORDER — RIZATRIPTAN BENZOATE 5 MG/1
5 TABLET ORAL
COMMUNITY
End: 2021-07-26

## 2020-03-03 RX ADMIN — INSULIN LISPRO 2 UNITS: 100 INJECTION, SOLUTION INTRAVENOUS; SUBCUTANEOUS at 21:11

## 2020-03-03 RX ADMIN — METOPROLOL TARTRATE 5 MG: 1 INJECTION, SOLUTION INTRAVENOUS at 21:30

## 2020-03-03 RX ADMIN — INSULIN LISPRO 4 UNITS: 100 INJECTION, SOLUTION INTRAVENOUS; SUBCUTANEOUS at 17:28

## 2020-03-03 RX ADMIN — INSULIN GLARGINE 15 UNITS: 100 INJECTION, SOLUTION SUBCUTANEOUS at 21:11

## 2020-03-03 RX ADMIN — ATORVASTATIN CALCIUM 40 MG: 40 TABLET, FILM COATED ORAL at 21:12

## 2020-03-03 RX ADMIN — ACETAMINOPHEN 650 MG: 325 TABLET ORAL at 04:35

## 2020-03-03 RX ADMIN — METOPROLOL TARTRATE 5 MG: 1 INJECTION, SOLUTION INTRAVENOUS at 03:16

## 2020-03-03 RX ADMIN — HEPARIN SODIUM 5000 UNITS: 5000 INJECTION INTRAVENOUS; SUBCUTANEOUS at 16:09

## 2020-03-03 RX ADMIN — INSULIN GLARGINE 15 UNITS: 100 INJECTION, SOLUTION SUBCUTANEOUS at 07:50

## 2020-03-03 RX ADMIN — Medication 325 MG: at 07:50

## 2020-03-03 RX ADMIN — INSULIN LISPRO 2 UNITS: 100 INJECTION, SOLUTION INTRAVENOUS; SUBCUTANEOUS at 07:50

## 2020-03-03 RX ADMIN — AMLODIPINE BESYLATE 5 MG: 5 TABLET ORAL at 16:09

## 2020-03-03 RX ADMIN — HEPARIN SODIUM 5000 UNITS: 5000 INJECTION INTRAVENOUS; SUBCUTANEOUS at 21:11

## 2020-03-03 RX ADMIN — HEPARIN SODIUM 5000 UNITS: 5000 INJECTION INTRAVENOUS; SUBCUTANEOUS at 06:10

## 2020-03-03 ASSESSMENT — PAIN DESCRIPTION - LOCATION
LOCATION: CHEST
LOCATION: HEAD

## 2020-03-03 ASSESSMENT — PAIN SCALES - GENERAL
PAINLEVEL_OUTOF10: 0
PAINLEVEL_OUTOF10: 8
PAINLEVEL_OUTOF10: 0
PAINLEVEL_OUTOF10: 7
PAINLEVEL_OUTOF10: 0
PAINLEVEL_OUTOF10: 0
PAINLEVEL_OUTOF10: 3
PAINLEVEL_OUTOF10: 0
PAINLEVEL_OUTOF10: 0

## 2020-03-03 ASSESSMENT — PAIN DESCRIPTION - PAIN TYPE
TYPE: ACUTE PAIN
TYPE: ACUTE PAIN

## 2020-03-03 ASSESSMENT — PAIN DESCRIPTION - ONSET
ONSET: SUDDEN
ONSET: ON-GOING

## 2020-03-03 ASSESSMENT — PAIN DESCRIPTION - PROGRESSION: CLINICAL_PROGRESSION: NOT CHANGED

## 2020-03-03 ASSESSMENT — PAIN DESCRIPTION - ORIENTATION: ORIENTATION: MID

## 2020-03-03 ASSESSMENT — PAIN DESCRIPTION - FREQUENCY
FREQUENCY: INTERMITTENT
FREQUENCY: INTERMITTENT

## 2020-03-03 ASSESSMENT — PAIN DESCRIPTION - DESCRIPTORS: DESCRIPTORS: BURNING

## 2020-03-03 ASSESSMENT — PAIN - FUNCTIONAL ASSESSMENT: PAIN_FUNCTIONAL_ASSESSMENT: ACTIVITIES ARE NOT PREVENTED

## 2020-03-03 NOTE — CONSULTS
[facility]   Consult Note         Today's Date: 3/3/2020  Patient Name: Joe Sellers  Date of admission: 3/1/2020  8:38 PM  Patient's age: 49 y.o., 1965  Admission Dx: JOSE ELIAS (acute kidney injury) ([facility] [address] [N17.9]    Reason for Consult:  Cardiac evaluation    Requesting Physician: Isela Perez MD    REASON FOR CONSULT:  Abn Stress    History Obtained From:  Patient, chart, staff, records    HISTORY OF PRESENT ILLNESS:      The patient is a 49 y.o. male who is admitted to the hospital for non cardiac chest pain. Happens at rest and at night. Not with exertion. Does not improve with rest or nitro. Had stress that was normal, but EF 46%. Past Medical History:   has a past medical history of CKD (chronic kidney disease), Diabetes mellitus ([facility] [address] Headache, and Hypertension. Past Surgical History:   has a past surgical history that includes Rotator cuff repair; Rotator cuff repair (Right); Kidney biopsy; and pr sono guide needle biopsy. Home Medications:    Prior to Admission medications    Medication Sig Start Date End Date Taking? Authorizing Provider   butalbital-APAP-caffeine (FIORICET) -40 MG CAPS per capsule Take 1 capsule by mouth every 4 hours as needed for Headaches or Migraine 3/16/19  Yes Cem London MD   lisinopril (PRINIVIL) 20 MG tablet Take 1 tablet by mouth daily  Patient taking differently: Take 40 mg by mouth daily  1/3/18  Yes ROBERT Galvez - CNP   glimepiride (AMARYL) 4 MG tablet Take 8 mg by mouth every morning (before breakfast)    Yes Historical Provider, MD   insulin glargine (LANTUS) 100 UNIT/ML injection vial Inject 15 Units into the skin 2 times daily.    Yes Historical Provider, MD   nortriptyline (PAMELOR) 25 MG capsule Take 25 mg by mouth nightly  5/14/19   Historical Provider, MD   ondansetron (ZOFRAN ODT) 4 MG disintegrating tablet Take 1 tablet by mouth every 8 hours as needed for Nausea 3/16/19   Cem London MD   ibuprofen results for input(s): CKTOTAL, CKMB, CKMBINDEX, TROPONINI in the last 72 hours. FASTING LIPID PANEL:  Lab Results   Component Value Date    HDL 33 03/02/2020    LDLDIRECT 99 03/02/2020    TRIG 551 03/02/2020     LIVER PROFILE:  Recent Labs     03/02/20  0320   AST 10   ALT 16   LABALBU 3.6     Troponins: Invalid input(s): TROPONIN     Other Current Problems  Patient Active Problem List   Diagnosis    Severe frontal headaches    Blurring of vision    Intractable migraine    Type 2 diabetes mellitus with stage 3 chronic kidney disease, with long-term current use of insulin (HCC)    Headache    Acute kidney injury superimposed on chronic kidney disease (HCC)    Stage 3 chronic kidney disease ([facility] [address]    Atypical chest pain    Essential hypertension    Class 2 severe obesity due to excess calories with serious comorbidity and body mass index (BMI) of 35.0 to 35.9 in adult [facility])     Stress 3/1/2020  Left ventricular ejection fraction:  46%  Small amount of breast attenuation artifact results in small fixed anterior   wall perfusion defect which resolves on prone imaging.  No reversible defect   to indicate ischemia.  No infarct. IMPRESSION & Recommendations:      Non cardiac chest pain. Normal perfusion scan on stress test. But had EF 46%. Likely pleuritic. VQ scan pending. Add low dose norvasc for possible vasospasm. EF on nuclear scan can usually underestimate EF by 5-10%. Can do echo to confirm if needed. Can follow up in clinic upon discharge    Discussed with patient, family, and Nurse. Electronically signed by Tremayne Regan DO on 3/3/2020 at 2:41 PM    Tremayne Regan, [id number] [facility]  [facility]  [phone]

## 2020-03-03 NOTE — PLAN OF CARE
Problem: Pain:  Goal: Pain level will decrease  Description  Pain level will decrease  3/2/2020 2231 by Milan Hussein RN  Outcome: Ongoing  Note:   Pain level assessment complete. Patient educated on pain scale and control interventions  PRN pain medication given per patient request if needed  Patient instructed to call out with new onset of pain or unrelieved pain  Patient denies any pain at this time.   3/2/2020 1211 by Shabana Hassan RN  Outcome: Ongoing  Goal: Control of acute pain  Description  Control of acute pain  3/2/2020 2231 by Milan Hussein RN  Outcome: Ongoing  3/2/2020 1211 by Shabana Hassan RN  Outcome: Ongoing  Goal: Control of chronic pain  Description  Control of chronic pain  3/2/2020 2231 by Milan Hussein RN  Outcome: Ongoing  3/2/2020 1211 by Shabana Hassan RN  Outcome: Ongoing

## 2020-03-03 NOTE — PROGRESS NOTES
[facility]    Progress Note    3/3/2020    8:38 AM    Name:   Nito Rojas  MRN:     [id number]     [place]:      [de-identified]   Room:   2003/2003-02  IP Day:  2  Admit Date:  3/1/2020  8:38 PM    PCP:   Sanjeev Alexander MD  Code Status:  Full Code    Subjective:     C/C:   Chief Complaint   Patient presents with    Chest Pain     increasing past 2 days     Interval History Status:  Patient had stress test showing intermediate risk for ischemia due to  low LVEF of 46%  Creatinine is trending down, 1.69 today (baseline 1.24 on 5/10/2017)   Had high d-dimer, VQ scan cannot be done today since he had a stress test yesterday, it is likely to be done tomorrow  Patient still gets off-and-on chest pain  Has raspy voice since 6 months after getting thyroid biopsy  States he had kidney biopsy was not back at [facility]  Brief History:   Harshil Borne a 49 y.o. M with hx of CKD, HTN, DM who presented with chest pain. The patient states that he began experiencing constant chest pain approximately 4 days ago. He states his pain has been progressive and worsened today. He describes his pain as dull, aching with orientation to his left chest. His pain does not radiate. He states it is moderate to severe. He denies diaphoresis or shortness of breath. He states he did get minimal relief with aspirin and morphine administered in the ER.      Creatinine 2.26, BUN 33. CO2 19, anion gap13. High sensitivity troponin 14, 14. WBC 7, hbg 12, hct 36.3. UA negative. CXR negative for acute cardiopulmonary process. Admitted for chest pain.        Review of Systems:     Constitutional:  negative for chills, fevers, sweats  Respiratory:  negative for cough, dyspnea on exertion, shortness of breath, wheezing  Cardiovascular:  + for intermittent chest pain, denies chest pressure/discomfort, lower extremity edema, palpitations  Gastrointestinal:  negative for abdominal pain, constipation, diarrhea, nausea, vomiting  Neurological:  negative for dizziness, headache    Medications: Allergies:  No Known Allergies    Current Meds:   Scheduled Meds:    insulin glargine  15 Units Subcutaneous BID    [Held by provider] lisinopril  40 mg Oral Daily    atorvastatin  40 mg Oral Nightly    aspirin  325 mg Oral Daily    sodium chloride flush  10 mL Intravenous 2 times per day    insulin lispro  0-12 Units Subcutaneous TID WC    insulin lispro  0-6 Units Subcutaneous Nightly    heparin (porcine)  5,000 Units Subcutaneous 3 times per day     Continuous Infusions:    dextrose      sodium chloride 75 mL/hr at 20 2339     PRN Meds: metoprolol, HYDROcodone 5 mg - acetaminophen **OR** HYDROcodone 5 mg - acetaminophen, magnesium sulfate, acetaminophen **OR** acetaminophen, magnesium hydroxide, promethazine **OR** ondansetron, nicotine, glucose, dextrose, glucagon (rDNA), dextrose, nitroGLYCERIN, sodium chloride flush    Data:     Past Medical History:   has a past medical history of CKD (chronic kidney disease), Diabetes mellitus ([address] Headache, and Hypertension. Social History:   reports that he has never smoked. He has never used smokeless tobacco. He reports current alcohol use. He reports that he does not use drugs. Family History:   Family History   Problem Relation Age of Onset    Heart Disease Mother     Cancer Mother        Vitals:  /62   Pulse 66   Temp 98.1 °F (36.7 °C) (Oral)   Resp 16   Ht 5' 9\" (1.753 m)   Wt 250 lb (113.4 kg)   SpO2 96%   BMI 36.92 kg/m²   Temp (24hrs), Av.1 °F (36.7 °C), Min:97.9 °F (36.6 °C), Max:98.4 °F (36.9 °C)    Recent Labs     20  0607 20  1115 20  1619 20   POCGLU 245* 196* 186* 172*       I/O (24Hr):     Intake/Output Summary (Last 24 hours) at 3/3/2020 0838  Last data filed at 3/3/2020 2394  Gross per 24 hour   Intake 1225 ml   Output 650 ml   Net 575 ml       Labs:  Hematology:  Recent Labs

## 2020-03-04 ENCOUNTER — APPOINTMENT (OUTPATIENT)
Dept: NUCLEAR MEDICINE | Age: 55
DRG: 684 | End: 2020-03-04
Payer: COMMERCIAL

## 2020-03-04 ENCOUNTER — APPOINTMENT (OUTPATIENT)
Dept: GENERAL RADIOLOGY | Age: 55
DRG: 684 | End: 2020-03-04
Payer: COMMERCIAL

## 2020-03-04 VITALS
WEIGHT: 251 LBS | BODY MASS INDEX: 37.18 KG/M2 | HEART RATE: 70 BPM | RESPIRATION RATE: 18 BRPM | OXYGEN SATURATION: 100 % | TEMPERATURE: 98.2 F | HEIGHT: 69 IN | DIASTOLIC BLOOD PRESSURE: 73 MMHG | SYSTOLIC BLOOD PRESSURE: 140 MMHG

## 2020-03-04 LAB
ANION GAP SERPL CALCULATED.3IONS-SCNC: 11 MMOL/L (ref 9–17)
BUN BLDV-MCNC: 25 MG/DL (ref 6–20)
BUN/CREAT BLD: 15 (ref 9–20)
CALCIUM SERPL-MCNC: 8.6 MG/DL (ref 8.6–10.4)
CHLORIDE BLD-SCNC: 108 MMOL/L (ref 98–107)
CO2: 19 MMOL/L (ref 20–31)
CREAT SERPL-MCNC: 1.62 MG/DL (ref 0.7–1.2)
GFR AFRICAN AMERICAN: 54 ML/MIN
GFR NON-AFRICAN AMERICAN: 45 ML/MIN
GFR SERPL CREATININE-BSD FRML MDRD: ABNORMAL ML/MIN/{1.73_M2}
GFR SERPL CREATININE-BSD FRML MDRD: ABNORMAL ML/MIN/{1.73_M2}
GLUCOSE BLD-MCNC: 116 MG/DL (ref 75–110)
GLUCOSE BLD-MCNC: 122 MG/DL (ref 70–99)
GLUCOSE BLD-MCNC: 146 MG/DL (ref 75–110)
GLUCOSE BLD-MCNC: 185 MG/DL (ref 75–110)
GLUCOSE BLD-MCNC: 96 MG/DL (ref 75–110)
HCT VFR BLD CALC: 32.4 % (ref 40.7–50.3)
HEMOGLOBIN: 10.9 G/DL (ref 13–17)
MCH RBC QN AUTO: 28.4 PG (ref 25.2–33.5)
MCHC RBC AUTO-ENTMCNC: 33.6 G/DL (ref 28.4–34.8)
MCV RBC AUTO: 84.4 FL (ref 82.6–102.9)
NRBC AUTOMATED: 0 PER 100 WBC
PDW BLD-RTO: 13.5 % (ref 11.8–14.4)
PLATELET # BLD: 188 K/UL (ref 138–453)
PMV BLD AUTO: 9.2 FL (ref 8.1–13.5)
POTASSIUM SERPL-SCNC: 4.2 MMOL/L (ref 3.7–5.3)
RBC # BLD: 3.84 M/UL (ref 4.21–5.77)
SODIUM BLD-SCNC: 138 MMOL/L (ref 135–144)
WBC # BLD: 6.1 K/UL (ref 3.5–11.3)

## 2020-03-04 PROCEDURE — 71045 X-RAY EXAM CHEST 1 VIEW: CPT

## 2020-03-04 PROCEDURE — 36415 COLL VENOUS BLD VENIPUNCTURE: CPT

## 2020-03-04 PROCEDURE — 78582 LUNG VENTILAT&PERFUS IMAGING: CPT

## 2020-03-04 PROCEDURE — 6370000000 HC RX 637 (ALT 250 FOR IP): Performed by: INTERNAL MEDICINE

## 2020-03-04 PROCEDURE — 3430000000 HC RX DIAGNOSTIC RADIOPHARMACEUTICAL: Performed by: INTERNAL MEDICINE

## 2020-03-04 PROCEDURE — A9538 TC99M PYROPHOSPHATE: HCPCS | Performed by: INTERNAL MEDICINE

## 2020-03-04 PROCEDURE — 6370000000 HC RX 637 (ALT 250 FOR IP): Performed by: NURSE PRACTITIONER

## 2020-03-04 PROCEDURE — 6360000002 HC RX W HCPCS: Performed by: NURSE PRACTITIONER

## 2020-03-04 PROCEDURE — 99232 SBSQ HOSP IP/OBS MODERATE 35: CPT | Performed by: INTERNAL MEDICINE

## 2020-03-04 PROCEDURE — 85027 COMPLETE CBC AUTOMATED: CPT

## 2020-03-04 PROCEDURE — A9540 TC99M MAA: HCPCS | Performed by: INTERNAL MEDICINE

## 2020-03-04 PROCEDURE — 80048 BASIC METABOLIC PNL TOTAL CA: CPT

## 2020-03-04 PROCEDURE — 2580000003 HC RX 258: Performed by: NURSE PRACTITIONER

## 2020-03-04 PROCEDURE — 82947 ASSAY GLUCOSE BLOOD QUANT: CPT

## 2020-03-04 RX ORDER — LISINOPRIL 20 MG/1
20 TABLET ORAL DAILY
Qty: 30 TABLET | Refills: 0 | Status: SHIPPED | OUTPATIENT
Start: 2020-03-04

## 2020-03-04 RX ORDER — ACETAMINOPHEN 325 MG/1
650 TABLET ORAL EVERY 6 HOURS PRN
Qty: 20 TABLET | Refills: 1 | Status: SHIPPED | OUTPATIENT
Start: 2020-03-04

## 2020-03-04 RX ORDER — AMLODIPINE BESYLATE 5 MG/1
5 TABLET ORAL DAILY
Qty: 30 TABLET | Refills: 3 | Status: SHIPPED | OUTPATIENT
Start: 2020-03-05 | End: 2021-07-26

## 2020-03-04 RX ORDER — LISINOPRIL 40 MG/1
40 TABLET ORAL DAILY
Status: ON HOLD | COMMUNITY
End: 2020-03-04 | Stop reason: HOSPADM

## 2020-03-04 RX ADMIN — INSULIN GLARGINE 15 UNITS: 100 INJECTION, SOLUTION SUBCUTANEOUS at 09:38

## 2020-03-04 RX ADMIN — HEPARIN SODIUM 5000 UNITS: 5000 INJECTION INTRAVENOUS; SUBCUTANEOUS at 06:14

## 2020-03-04 RX ADMIN — AMLODIPINE BESYLATE 5 MG: 5 TABLET ORAL at 09:37

## 2020-03-04 RX ADMIN — SODIUM CHLORIDE: 9 INJECTION, SOLUTION INTRAVENOUS at 01:39

## 2020-03-04 RX ADMIN — INSULIN LISPRO 2 UNITS: 100 INJECTION, SOLUTION INTRAVENOUS; SUBCUTANEOUS at 11:51

## 2020-03-04 RX ADMIN — Medication 7.8 MILLICURIE: at 13:20

## 2020-03-04 RX ADMIN — ACETAMINOPHEN 650 MG: 325 TABLET ORAL at 06:22

## 2020-03-04 RX ADMIN — Medication 325 MG: at 09:37

## 2020-03-04 RX ADMIN — Medication 37 MILLICURIE: at 13:05

## 2020-03-04 ASSESSMENT — PAIN DESCRIPTION - ONSET
ONSET: GRADUAL

## 2020-03-04 ASSESSMENT — PAIN SCALES - GENERAL
PAINLEVEL_OUTOF10: 4
PAINLEVEL_OUTOF10: 0
PAINLEVEL_OUTOF10: 3
PAINLEVEL_OUTOF10: 0
PAINLEVEL_OUTOF10: 4

## 2020-03-04 ASSESSMENT — PAIN SCALES - WONG BAKER
WONGBAKER_NUMERICALRESPONSE: 0
WONGBAKER_NUMERICALRESPONSE: 2
WONGBAKER_NUMERICALRESPONSE: 0
WONGBAKER_NUMERICALRESPONSE: 2
WONGBAKER_NUMERICALRESPONSE: 0

## 2020-03-04 ASSESSMENT — PAIN DESCRIPTION - DESCRIPTORS
DESCRIPTORS: ACHING;HEADACHE

## 2020-03-04 ASSESSMENT — PAIN DESCRIPTION - FREQUENCY
FREQUENCY: INTERMITTENT

## 2020-03-04 ASSESSMENT — PAIN DESCRIPTION - LOCATION
LOCATION: HEAD

## 2020-03-04 ASSESSMENT — PAIN - FUNCTIONAL ASSESSMENT
PAIN_FUNCTIONAL_ASSESSMENT: ACTIVITIES ARE NOT PREVENTED

## 2020-03-04 ASSESSMENT — PAIN DESCRIPTION - PROGRESSION
CLINICAL_PROGRESSION: NOT CHANGED

## 2020-03-04 ASSESSMENT — PAIN DESCRIPTION - ORIENTATION
ORIENTATION: ANTERIOR

## 2020-03-04 ASSESSMENT — PAIN DESCRIPTION - PAIN TYPE
TYPE: ACUTE PAIN

## 2020-03-04 NOTE — DISCHARGE SUMMARY
LVEF.       Consultations:    Consults:     Final Specialist Recommendations/Findings:   IP CONSULT TO HOSPITALIST  IP CONSULT TO CARDIOLOGY      The patient was seen and examined on day of discharge and this discharge summary is in conjunction with any daily progress note from day of discharge.     Discharge plan:     Disposition: Home    Physician Follow Up:     Isabelle Og MD  [address] [facility] [address]  [address], [address] [facility] [address]  [facility] [address]  [phone]    Go on 3/20/2020  Appointment 3-20-20 at 10am with Isidra Machuca ELISA       Requiring Further Evaluation/Follow Up POST HOSPITALIZATION/Incidental Findings: BMP in 5 days and send results to PCP and his nephrologist at [facility]: cardiac diet and diabetic diet    Activity: As tolerated    Instructions to Patient: Hold lisinopril till kidney function improves    Discharge Medications:      Medication List      STOP taking these medications    butalbital-APAP-caffeine -40 MG Caps per capsule  Commonly known as:  Fioricet     nortriptyline 25 MG capsule  Commonly known as:  PAMELOR     SUMAtriptan 25 MG tablet  Commonly known as:  IMITREX        ASK your doctor about these medications    Crestor 10 MG tablet  Generic drug:  rosuvastatin     glimepiride 4 MG tablet  Commonly known as:  AMARYL     insulin glargine 100 UNIT/ML injection vial  Commonly known as:  LANTUS     lisinopril 40 MG tablet  Commonly known as:  PRINIVIL;ZESTRIL  Ask about: Which instructions should I use?     ondansetron 4 MG disintegrating tablet  Commonly known as:  Zofran ODT  Take 1 tablet by mouth every 8 hours as needed for Nausea     psyllium 28.3 % Pack  Commonly known as:  KONSYL     rizatriptan 5 MG tablet  Commonly known as:  MAXALT     Topamax 50 MG tablet  Generic drug:  topiramate     verapamil 240 MG extended release tablet  Commonly known as:  CALAN SR     vitamin D 25 MCG (1000 UT) Tabs tablet  Commonly known as:  CHOLECALCIFEROL            No discharge procedures on file. Time Spent on discharge is  20 mins in patient examination, evaluation, counseling as well as medication reconciliation, prescriptions for required medications, discharge plan and follow up. Electronically signed by   Codey Soliz MD  3/4/2020  1:08 PM      Thank you Dr. Elsi Mitchell MD for the opportunity to be involved in this patient's care.

## 2020-03-04 NOTE — PROGRESS NOTES
[address]     Progress Note    3/4/2020    8:27 AM    Name:   Kierra Mcgraw  MRN:     [id number]     [place]:      [de-identified]   Room:   2003/2003-02  IP Day:  3  Admit Date:  3/1/2020  8:38 PM    PCP:   Deedee Barrett MD  Code Status:  Full Code    Subjective:     C/C:   Chief Complaint   Patient presents with    Chest Pain     increasing past 2 days     Interval History Status:  Patient had stress test showing intermediate risk for ischemia due to  low LVEF of 46%  Creatinine is trending down, 1.62 today (baseline 1.24 on 5/10/2017)   Had high d-dimer, VQ scan will be done today   patient still gets off-and-on chest pain  Has raspy voice since 6 months after getting thyroid biopsy  States he had kidney biopsy  1 month back at [facility]  Brief History:   Talia Dumont a 49 y.o. M with hx of CKD, HTN, DM who presented with chest pain. The patient states that he began experiencing constant chest pain approximately 4 days ago. He states his pain has been progressive and worsened today. He describes his pain as dull, aching with orientation to his left chest. His pain does not radiate. He states it is moderate to severe. He denies diaphoresis or shortness of breath. He states he did get minimal relief with aspirin and morphine administered in the ER.      Creatinine 2.26, BUN 33. CO2 19, anion gap13. High sensitivity troponin 14, 14. WBC 7, hbg 12, hct 36.3. UA negative. CXR negative for acute cardiopulmonary process. Admitted for chest pain.        Review of Systems:     Constitutional:  negative for chills, fevers, sweats  Respiratory:  negative for cough, dyspnea on exertion, shortness of breath, wheezing  Cardiovascular:  + for intermittent chest pain, denies chest pressure/discomfort, lower extremity edema, palpitations  Gastrointestinal:  negative for abdominal pain, constipation, diarrhea, nausea, vomiting  Neurological:  negative for POCHCO3, AUE1PMP, HCO3, NBEA, PBEA, BEART, BE, THGBART, THB, EEL2MRA, CZVR6UZZ, A8WJPNYB, O2SAT, FIO2  Lab Results   Component Value Date/Time    SPECIAL NOT REPORTED 03/16/2019 12:24 AM     Lab Results   Component Value Date/Time    CULTURE  04/20/2015 10:30 AM     NO SALMONELLA, SHIGELLA, YERSINIA OR CAMPYLOBACTER ISOLATED    CULTURE NEGATIVE FOR SHIGATOXIN 04/20/2015 10:30 AM    CULTURE NO ESCHERICHIA COLI O157:H7 ISOLATED 04/20/2015 10:30 AM    CULTURE  04/20/2015 10:30 AM     [facility] [address] [facility] [address] [place] [address] [phone]       Radiology:  Us Renal Complete    Result Date: 3/2/2020  Echogenic focus in the right kidney as measured and described above, finding is nonspecific and may represent small hyperdense cyst or nonshadowing stone. Otherwise negative ultrasound of the kidneys and urinary bladder. Xr Chest Portable    Result Date: 3/1/2020  No acute cardiopulmonary abnormality. Nm Cardiac Stress Test Nuclear Imaging    Result Date: 3/2/2020  No stress-induced ischemia or infarct. Breast attenuation artifact in the anterior wall. No infarct. LVEF 46%. Risk stratification: Intermediate risk based on the LVEF.        Physical Examination:        General appearance:  alert, cooperative and no distress, voice is raspy  Mental Status:  oriented to person, place and time and normal affect  Lungs:  clear to auscultation bilaterally, normal effort  Heart:  regular rate and rhythm, no murmur  Abdomen:  soft, nontender, nondistended, normal bowel sounds, no masses, hepatomegaly, splenomegaly  Extremities:  no edema, redness, tenderness in the calves  Skin:  no gross lesions, rashes, induration    Assessment:        Hospital Problems           Last Modified POA    * (Principal) Atypical chest pain 3/2/2020 Yes    Type 2 diabetes mellitus with stage 3 chronic kidney disease, with long-term current use of insulin ([address] 3/1/2020 Yes    Acute kidney injury superimposed on chronic kidney disease

## 2020-03-04 NOTE — PROGRESS NOTES
Transitions of Care Pharmacy Service   Medication Review    The patient's list of current home medications was reviewed. Source(s) of information: patient, [place] pharmacy    Please feel free to call me with any questions about this encounter. Thank you.     Janet Gupta [address] [place] [address]   Transitions of Care Pharmacy Service  Phone:  [phone]  Fax: [phone]      Electronically signed by Janet Gupta [address] [place] [address] on 3/4/2020 at 10:30 AM           Medications Prior to Admission:   lisinopril (PRINIVIL;ZESTRIL) 40 MG tablet, Take 40 mg by mouth daily  topiramate (TOPAMAX) 50 MG tablet, Take 50 mg by mouth 2 times daily  rizatriptan (MAXALT) 5 MG tablet, Take 5 mg by mouth once as needed for Migraine May repeat in 2 hours if needed  verapamil (CALAN SR) 240 MG extended release tablet, Take 240 mg by mouth nightly  vitamin D (CHOLECALCIFEROL) 25 MCG (1000 UT) TABS tablet, Take 1,000 Units by mouth daily  psyllium (KONSYL) 28.3 % PACK, Take 1 packet by mouth daily as needed for Constipation  rosuvastatin (CRESTOR) 10 MG tablet, Take 10 mg by mouth daily  glimepiride (AMARYL) 4 MG tablet, Take 8 mg by mouth every morning (before breakfast) Takes 2 tabs (=8mg) once daily  insulin glargine (LANTUS) 100 UNIT/ML injection vial, Inject 15 Units into the skin every morning   ondansetron (ZOFRAN ODT) 4 MG disintegrating tablet, Take 1 tablet by mouth every 8 hours as needed for Nausea

## 2020-03-04 NOTE — PROGRESS NOTES
CLINICAL PHARMACY NOTE: MEDS TO [address] Select Patient?: No  Total # of Prescriptions Filled: 3   The following medications were delivered to the patient:  · LISINOPRIL 20MG  · PAIN & FEVER 325MG  · AMLODIPINE 5MG  Total # of Interventions Completed: 0  Time Spent (min): 30    Additional Documentation:

## 2020-06-05 ENCOUNTER — HOSPITAL ENCOUNTER (EMERGENCY)
Age: 55
Discharge: LEFT BEFORE DISCHARGE | End: 2020-06-05
Payer: COMMERCIAL

## 2020-06-05 VITALS
TEMPERATURE: 98.6 F | RESPIRATION RATE: 18 BRPM | DIASTOLIC BLOOD PRESSURE: 89 MMHG | HEIGHT: 69 IN | SYSTOLIC BLOOD PRESSURE: 162 MMHG | BODY MASS INDEX: 36.14 KG/M2 | WEIGHT: 244 LBS | OXYGEN SATURATION: 98 % | HEART RATE: 83 BPM

## 2020-06-05 ASSESSMENT — PAIN DESCRIPTION - LOCATION: LOCATION: KNEE

## 2020-06-05 ASSESSMENT — PAIN SCALES - GENERAL: PAINLEVEL_OUTOF10: 9

## 2020-06-05 ASSESSMENT — PAIN DESCRIPTION - PAIN TYPE: TYPE: ACUTE PAIN

## 2020-06-05 ASSESSMENT — PAIN DESCRIPTION - ORIENTATION: ORIENTATION: LEFT

## 2020-06-05 ASSESSMENT — PAIN DESCRIPTION - DESCRIPTORS: DESCRIPTORS: ACHING;CONSTANT

## 2020-07-21 ENCOUNTER — HOSPITAL ENCOUNTER (EMERGENCY)
Age: 55
Discharge: HOME | End: 2020-07-21
Attending: EMERGENCY MEDICINE
Payer: COMMERCIAL

## 2020-07-21 ENCOUNTER — APPOINTMENT (OUTPATIENT)
Dept: GENERAL RADIOLOGY | Age: 55
End: 2020-07-21
Payer: COMMERCIAL

## 2020-07-21 VITALS
DIASTOLIC BLOOD PRESSURE: 94 MMHG | BODY MASS INDEX: 34.66 KG/M2 | OXYGEN SATURATION: 97 % | TEMPERATURE: 98.3 F | WEIGHT: 234 LBS | HEIGHT: 69 IN | RESPIRATION RATE: 16 BRPM | HEART RATE: 87 BPM | SYSTOLIC BLOOD PRESSURE: 165 MMHG

## 2020-07-21 LAB
ABSOLUTE EOS #: 0.27 K/UL (ref 0–0.44)
ABSOLUTE IMMATURE GRANULOCYTE: 0.07 K/UL (ref 0–0.3)
ABSOLUTE LYMPH #: 1.58 K/UL (ref 1.1–3.7)
ABSOLUTE MONO #: 0.53 K/UL (ref 0.1–1.2)
BASOPHILS # BLD: 1 % (ref 0–2)
BASOPHILS ABSOLUTE: 0.07 K/UL (ref 0–0.2)
DIFFERENTIAL TYPE: ABNORMAL
EOSINOPHILS RELATIVE PERCENT: 3 % (ref 1–4)
HCT VFR BLD CALC: 35.9 % (ref 40.7–50.3)
HEMOGLOBIN: 12.2 G/DL (ref 13–17)
IMMATURE GRANULOCYTES: 1 %
LYMPHOCYTES # BLD: 16 % (ref 24–43)
MCH RBC QN AUTO: 27.7 PG (ref 25.2–33.5)
MCHC RBC AUTO-ENTMCNC: 34 G/DL (ref 28.4–34.8)
MCV RBC AUTO: 81.6 FL (ref 82.6–102.9)
MONOCYTES # BLD: 5 % (ref 3–12)
NRBC AUTOMATED: 0 PER 100 WBC
PDW BLD-RTO: 12.6 % (ref 11.8–14.4)
PLATELET # BLD: 218 K/UL (ref 138–453)
PLATELET ESTIMATE: ABNORMAL
PMV BLD AUTO: 9.7 FL (ref 8.1–13.5)
RBC # BLD: 4.4 M/UL (ref 4.21–5.77)
RBC # BLD: ABNORMAL 10*6/UL
SEDIMENTATION RATE, ERYTHROCYTE: 35 MM (ref 0–20)
SEG NEUTROPHILS: 74 % (ref 36–65)
SEGMENTED NEUTROPHILS ABSOLUTE COUNT: 7.67 K/UL (ref 1.5–8.1)
URIC ACID: 8.5 MG/DL (ref 3.4–7)
WBC # BLD: 10.2 K/UL (ref 3.5–11.3)
WBC # BLD: ABNORMAL 10*3/UL

## 2020-07-21 PROCEDURE — 99283 EMERGENCY DEPT VISIT LOW MDM: CPT

## 2020-07-21 PROCEDURE — 73562 X-RAY EXAM OF KNEE 3: CPT

## 2020-07-21 PROCEDURE — 6370000000 HC RX 637 (ALT 250 FOR IP): Performed by: EMERGENCY MEDICINE

## 2020-07-21 PROCEDURE — 96372 THER/PROPH/DIAG INJ SC/IM: CPT

## 2020-07-21 PROCEDURE — 6360000002 HC RX W HCPCS: Performed by: EMERGENCY MEDICINE

## 2020-07-21 PROCEDURE — 85025 COMPLETE CBC W/AUTO DIFF WBC: CPT

## 2020-07-21 PROCEDURE — 36415 COLL VENOUS BLD VENIPUNCTURE: CPT

## 2020-07-21 PROCEDURE — 85652 RBC SED RATE AUTOMATED: CPT

## 2020-07-21 PROCEDURE — 84550 ASSAY OF BLOOD/URIC ACID: CPT

## 2020-07-21 RX ORDER — PREDNISONE 50 MG/1
50 TABLET ORAL DAILY
Qty: 5 TABLET | Refills: 0 | Status: SHIPPED | OUTPATIENT
Start: 2020-07-21 | End: 2020-07-26

## 2020-07-21 RX ORDER — KETOROLAC TROMETHAMINE 30 MG/ML
30 INJECTION, SOLUTION INTRAMUSCULAR; INTRAVENOUS ONCE
Status: COMPLETED | OUTPATIENT
Start: 2020-07-21 | End: 2020-07-21

## 2020-07-21 RX ORDER — HYDROCODONE BITARTRATE AND ACETAMINOPHEN 5; 325 MG/1; MG/1
1 TABLET ORAL EVERY 6 HOURS PRN
Qty: 10 TABLET | Refills: 0 | Status: SHIPPED | OUTPATIENT
Start: 2020-07-21 | End: 2020-07-24

## 2020-07-21 RX ORDER — PREDNISONE 20 MG/1
60 TABLET ORAL ONCE
Status: COMPLETED | OUTPATIENT
Start: 2020-07-21 | End: 2020-07-21

## 2020-07-21 RX ORDER — KETOROLAC TROMETHAMINE 30 MG/ML
30 INJECTION, SOLUTION INTRAMUSCULAR; INTRAVENOUS ONCE
Status: DISCONTINUED | OUTPATIENT
Start: 2020-07-21 | End: 2020-07-21

## 2020-07-21 RX ADMIN — KETOROLAC TROMETHAMINE 30 MG: 30 INJECTION, SOLUTION INTRAMUSCULAR at 05:49

## 2020-07-21 RX ADMIN — PREDNISONE 60 MG: 20 TABLET ORAL at 05:49

## 2020-07-21 ASSESSMENT — PAIN SCALES - GENERAL
PAINLEVEL_OUTOF10: 10
PAINLEVEL_OUTOF10: 10

## 2020-07-21 ASSESSMENT — ENCOUNTER SYMPTOMS
COUGH: 0
SORE THROAT: 0
RHINORRHEA: 0
SHORTNESS OF BREATH: 0
EYE REDNESS: 0
COLOR CHANGE: 0
NAUSEA: 0
DIARRHEA: 0
EYE DISCHARGE: 0
VOMITING: 0

## 2020-07-21 ASSESSMENT — PAIN DESCRIPTION - ORIENTATION: ORIENTATION: RIGHT

## 2020-07-21 ASSESSMENT — PAIN DESCRIPTION - LOCATION: LOCATION: KNEE

## 2020-07-21 ASSESSMENT — PAIN DESCRIPTION - PAIN TYPE: TYPE: ACUTE PAIN

## 2021-03-08 ENCOUNTER — HOSPITAL ENCOUNTER (EMERGENCY)
Age: 56
Discharge: HOME | End: 2021-03-08
Attending: EMERGENCY MEDICINE
Payer: COMMERCIAL

## 2021-03-08 VITALS
HEART RATE: 85 BPM | TEMPERATURE: 98.4 F | WEIGHT: 234 LBS | RESPIRATION RATE: 18 BRPM | SYSTOLIC BLOOD PRESSURE: 185 MMHG | DIASTOLIC BLOOD PRESSURE: 101 MMHG | BODY MASS INDEX: 35.46 KG/M2 | OXYGEN SATURATION: 98 % | HEIGHT: 68 IN

## 2021-03-08 DIAGNOSIS — M54.13 RADICULOPATHY OF CERVICOTHORACIC REGION: Primary | ICD-10-CM

## 2021-03-08 PROCEDURE — 99283 EMERGENCY DEPT VISIT LOW MDM: CPT

## 2021-03-08 ASSESSMENT — ENCOUNTER SYMPTOMS
DIARRHEA: 0
CONSTIPATION: 0
SINUS PAIN: 0
ABDOMINAL DISTENTION: 0
APNEA: 0
EYES NEGATIVE: 1
SHORTNESS OF BREATH: 0
VOMITING: 0
CHEST TIGHTNESS: 0
COLOR CHANGE: 0

## 2021-03-08 NOTE — ED PROVIDER NOTES
EMERGENCY DEPARTMENT ENCOUNTER    Pt Name: Franki Sierra  MRN: [id number]  [place] 1965  Date of evaluation: 3/8/21  CHIEF COMPLAINT       Chief Complaint   Patient presents with    Arm Pain     right sided arm/hand pain with intermittent numbness and tingling s/p receiving covid shot 3/1     HISTORY OF PRESENT ILLNESS   29-year-old male presenting to the emergency room for 4-day history of intermittent paresthesias to the left forearm. Patient reports that a few days ago he had had the Materna vaccine. He states that the numbness and tingling started after this. Patient reports that he woke up 1 morning and felt the numbness in the forearm and hand as well as to the pinky and ring finger. He states that throughout the last several days this has been happening several times a day. He does not note any weakness in the hand but states a tingling. No pain. REVIEW OF SYSTEMS     Review of Systems   Constitutional: Negative for activity change, chills and diaphoresis. HENT: Negative for congestion, sinus pain and tinnitus. Eyes: Negative. Respiratory: Negative for apnea, chest tightness and shortness of breath. Gastrointestinal: Negative for abdominal distention, constipation, diarrhea and vomiting. Genitourinary: Negative for difficulty urinating and frequency. Musculoskeletal: Negative for arthralgias and myalgias. Skin: Negative for color change and rash. Neurological: Negative for dizziness. Hematological: Negative. Psychiatric/Behavioral: Negative.         PASTMEDICAL HISTORY     Past Medical History:   Diagnosis Date    CKD (chronic kidney disease)     Diabetes mellitus ([facility]     Headache     Hypertension      Past Problem List  Patient Active Problem List   Diagnosis Code    Severe frontal headaches R51.9    Blurring of vision H53.8    Intractable migraine G43.919    Type 2 diabetes mellitus with stage 3 chronic kidney disease, with long-term current use of insulin (HCC) E11.22, N18.30, Z79.4    Headache R51.9    Acute kidney injury superimposed on chronic kidney disease ([facility] N17.9, N18.9    Stage 3 chronic kidney disease N18.30    Atypical chest pain R07.89    Essential hypertension I10    Class 2 severe obesity due to excess calories with serious comorbidity and body mass index (BMI) of 35.0 to 35.9 in adult ([facility] E66.01, Z68.35     SURGICAL HISTORY       Past Surgical History:   Procedure Laterality Date    KIDNEY BIOPSY      2/2020    MA SONO GUIDE NEEDLE BIOPSY      on thyroid 11/19    ROTATOR CUFF REPAIR      ROTATOR CUFF REPAIR Right      CURRENT MEDICATIONS       Previous Medications    ACETAMINOPHEN (TYLENOL) 325 MG TABLET    Take 2 tablets by mouth every 6 hours as needed for Pain    AMLODIPINE (NORVASC) 5 MG TABLET    Take 1 tablet by mouth daily    GLIMEPIRIDE (AMARYL) 4 MG TABLET    Take 8 mg by mouth every morning (before breakfast) Takes 2 tabs (=8mg) once daily    INSULIN GLARGINE (LANTUS) 100 UNIT/ML INJECTION VIAL    Inject 15 Units into the skin every morning     LISINOPRIL (PRINIVIL) 20 MG TABLET    Take 1 tablet by mouth daily    ONDANSETRON (ZOFRAN ODT) 4 MG DISINTEGRATING TABLET    Take 1 tablet by mouth every 8 hours as needed for Nausea    PSYLLIUM (KONSYL) 28.3 % PACK    Take 1 packet by mouth daily as needed for Constipation    RIZATRIPTAN (MAXALT) 5 MG TABLET    Take 5 mg by mouth once as needed for Migraine May repeat in 2 hours if needed    ROSUVASTATIN (CRESTOR) 10 MG TABLET    Take 10 mg by mouth daily    TOPIRAMATE (TOPAMAX) 50 MG TABLET    Take 50 mg by mouth 2 times daily    VITAMIN D (CHOLECALCIFEROL) 25 MCG (1000 UT) TABS TABLET    Take 1,000 Units by mouth daily     ALLERGIES     is allergic to dexamethasone. FAMILY HISTORY     He indicated that his mother is alive. He indicated that his father is alive.      SOCIAL HISTORY       Social History     Tobacco Use    Smoking status: Never Smoker    Smokeless tobacco: Never Used   Substance Use Topics    Alcohol use: Yes     Comment: occas    Drug use: No     PHYSICAL EXAM     INITIAL VITALS: BP (!) 185/101   Pulse 85   Temp 98.4 °F (36.9 °C) (Oral)   Resp 18   Ht 5' 8\" (1.727 m)   Wt 234 lb (106.1 kg)   SpO2 98%   BMI 35.58 kg/m²    Physical Exam  Constitutional:       General: He is not in acute distress. Appearance: He is well-developed. HENT:      Head: Normocephalic. Eyes:      Pupils: Pupils are equal, round, and reactive to light. Cardiovascular:      Rate and Rhythm: Normal rate and regular rhythm. Heart sounds: Normal heart sounds. Pulmonary:      Effort: Pulmonary effort is normal. No respiratory distress. Breath sounds: Normal breath sounds. Abdominal:      General: Bowel sounds are normal.      Palpations: Abdomen is soft. Tenderness: There is no abdominal tenderness. Musculoskeletal: Normal range of motion. Skin:     General: Skin is warm and dry. Neurological:      Mental Status: He is alert and oriented to person, place, and time. MEDICAL DECISION MAKIN-year-old male presenting to the emergency room for 4-day intermittent history of paresthesias to the lateral aspect of the right arm. Patient has a normal neurologic exam here in the emergency room. He has normal strength and range of motion of both upper extremities. He has normal sensation. Given the distribution along the lateral aspect of the forearm into the lateral aspect of the hand and fingers this may be an ulnar radiculopathy. Patient given PCP follow-up instructions and neurology referral information. Paresthesias are in an ulnar nerve distribution. History and exam not consistent with CVA.   CRITICAL CARE:       PROCEDURES:    Procedures    DIAGNOSTIC RESULTS   EKG:All EKG's are interpreted by the Emergency Department Physician who either signs or Co-signs this chart in the absence of a cardiologist.        RADIOLOGY:All plain film, CT, MRI, and formal ultrasound images (except ED bedside ultrasound) are read by the radiologist, see reports below, unless otherwisenoted in MDM or here. No orders to display     LABS: All lab results were reviewed by myself, and all abnormals are listed below. Labs Reviewed - No data to display    EMERGENCY DEPARTMENTCOURSE:         Vitals:    Vitals:    03/08/21 0224   BP: (!) 185/101   Pulse: 85   Resp: 18   Temp: 98.4 °F (36.9 °C)   TempSrc: Oral   SpO2: 98%   Weight: 234 lb (106.1 kg)   Height: 5' 8\" (1.727 m)       The patient was given the following medications while in the emergency department:  No orders of the defined types were placed in this encounter. CONSULTS:  None    FINAL IMPRESSION      1.  Radiculopathy of cervicothoracic region          DISPOSITION/PLAN   DISPOSITION Decision To Discharge 03/08/2021 03:32:09 AM      PATIENT REFERRED TO:  Maryam Obrien MD  [address]  [phone]    Schedule an appointment as soon as possible for a visit in 1 week      Rolanda Aguilera MD  [address] [place] [id number]  [phone]    Schedule an appointment as soon as possible for a visit in 1 week      DISCHARGE MEDICATIONS:  New Prescriptions    No medications on file     Megan George MD  Attending Emergency Physician                  Soham Black MD  03/08/21 7709

## 2021-03-09 ENCOUNTER — HOSPITAL ENCOUNTER (EMERGENCY)
Age: 56
Discharge: HOME | End: 2021-03-09
Attending: EMERGENCY MEDICINE
Payer: COMMERCIAL

## 2021-03-09 VITALS
OXYGEN SATURATION: 98 % | SYSTOLIC BLOOD PRESSURE: 171 MMHG | HEIGHT: 69 IN | RESPIRATION RATE: 16 BRPM | DIASTOLIC BLOOD PRESSURE: 91 MMHG | WEIGHT: 251 LBS | BODY MASS INDEX: 37.18 KG/M2 | TEMPERATURE: 97.8 F | HEART RATE: 84 BPM

## 2021-03-09 DIAGNOSIS — R51.9 NONINTRACTABLE HEADACHE, UNSPECIFIED CHRONICITY PATTERN, UNSPECIFIED HEADACHE TYPE: Primary | ICD-10-CM

## 2021-03-09 LAB
CHP ED QC CHECK: NORMAL
GLUCOSE BLD-MCNC: 176 MG/DL
GLUCOSE BLD-MCNC: 176 MG/DL (ref 75–110)

## 2021-03-09 PROCEDURE — 82947 ASSAY GLUCOSE BLOOD QUANT: CPT

## 2021-03-09 PROCEDURE — 99284 EMERGENCY DEPT VISIT MOD MDM: CPT

## 2021-03-09 PROCEDURE — 96375 TX/PRO/DX INJ NEW DRUG ADDON: CPT

## 2021-03-09 PROCEDURE — 2580000003 HC RX 258: Performed by: EMERGENCY MEDICINE

## 2021-03-09 PROCEDURE — 96374 THER/PROPH/DIAG INJ IV PUSH: CPT

## 2021-03-09 PROCEDURE — 6370000000 HC RX 637 (ALT 250 FOR IP): Performed by: EMERGENCY MEDICINE

## 2021-03-09 PROCEDURE — 6360000002 HC RX W HCPCS: Performed by: EMERGENCY MEDICINE

## 2021-03-09 RX ORDER — DIPHENHYDRAMINE HYDROCHLORIDE 50 MG/ML
25 INJECTION INTRAMUSCULAR; INTRAVENOUS ONCE
Status: COMPLETED | OUTPATIENT
Start: 2021-03-09 | End: 2021-03-09

## 2021-03-09 RX ORDER — BUTALBITAL, ACETAMINOPHEN AND CAFFEINE 50; 325; 40 MG/1; MG/1; MG/1
1 TABLET ORAL EVERY 4 HOURS PRN
Qty: 30 TABLET | Refills: 0 | Status: SHIPPED | OUTPATIENT
Start: 2021-03-09

## 2021-03-09 RX ORDER — 0.9 % SODIUM CHLORIDE 0.9 %
1000 INTRAVENOUS SOLUTION INTRAVENOUS ONCE
Status: COMPLETED | OUTPATIENT
Start: 2021-03-09 | End: 2021-03-09

## 2021-03-09 RX ORDER — BUTALBITAL, ACETAMINOPHEN AND CAFFEINE 50; 325; 40 MG/1; MG/1; MG/1
2 TABLET ORAL ONCE
Status: COMPLETED | OUTPATIENT
Start: 2021-03-09 | End: 2021-03-09

## 2021-03-09 RX ORDER — METHYLPREDNISOLONE SODIUM SUCCINATE 40 MG/ML
40 INJECTION, POWDER, LYOPHILIZED, FOR SOLUTION INTRAMUSCULAR; INTRAVENOUS ONCE
Status: COMPLETED | OUTPATIENT
Start: 2021-03-09 | End: 2021-03-09

## 2021-03-09 RX ORDER — METOCLOPRAMIDE HYDROCHLORIDE 5 MG/ML
10 INJECTION INTRAMUSCULAR; INTRAVENOUS ONCE
Status: COMPLETED | OUTPATIENT
Start: 2021-03-09 | End: 2021-03-09

## 2021-03-09 RX ADMIN — METHYLPREDNISOLONE SODIUM SUCCINATE 40 MG: 40 INJECTION, POWDER, FOR SOLUTION INTRAMUSCULAR; INTRAVENOUS at 08:09

## 2021-03-09 RX ADMIN — BUTALBITAL, ACETAMINOPHEN, AND CAFFEINE 2 TABLET: 50; 325; 40 TABLET ORAL at 08:07

## 2021-03-09 RX ADMIN — SODIUM CHLORIDE 1000 ML: 9 INJECTION, SOLUTION INTRAVENOUS at 08:22

## 2021-03-09 RX ADMIN — METOCLOPRAMIDE 10 MG: 5 INJECTION, SOLUTION INTRAMUSCULAR; INTRAVENOUS at 08:09

## 2021-03-09 RX ADMIN — DIPHENHYDRAMINE HYDROCHLORIDE 25 MG: 50 INJECTION, SOLUTION INTRAMUSCULAR; INTRAVENOUS at 08:08

## 2021-03-09 ASSESSMENT — ENCOUNTER SYMPTOMS
SHORTNESS OF BREATH: 0
TROUBLE SWALLOWING: 0
EYE REDNESS: 0
VOMITING: 0
ABDOMINAL PAIN: 0

## 2021-03-09 ASSESSMENT — PAIN SCALES - GENERAL
PAINLEVEL_OUTOF10: 6
PAINLEVEL_OUTOF10: 6

## 2021-03-09 NOTE — ED PROVIDER NOTES
EMERGENCY DEPARTMENT ENCOUNTER    Pt Name: Hola Medina  MRN: [id number]  [place] 1965  Date of evaluation: 3/9/21  CHIEF COMPLAINT       Chief Complaint   Patient presents with    Headache     HISTORY OF PRESENT ILLNESS   Patient is a 52-year-old male here with headache. He states it started a day and a half ago in the evening. He states it was not sudden in onset not the worst of his life. He had a similar headache several years ago. States it is gradually gotten worse. He states it is frontal in nature nonradiating. Denies any fall or head injury. Not on blood thinners. Denies any nausea vomiting chest pain shortness of breath neck pain or stiffness. Denies lightheadedness dizziness blurred or double vision or trouble speech. No focal weakness numbness tingling. No history of brain aneurysm. He does have history of migraines. He states he takes verapamil and Topamax for prevention. He is on blood pressure medications and has not taken them yet today. REVIEW OF SYSTEMS     Review of Systems   Constitutional: Negative for fever. HENT: Negative for trouble swallowing. Eyes: Negative for redness. Respiratory: Negative for shortness of breath. Cardiovascular: Negative for chest pain. Gastrointestinal: Negative for abdominal pain and vomiting. Genitourinary: Negative for difficulty urinating. Musculoskeletal: Negative for neck stiffness. Skin: Negative for rash. Neurological: Positive for headaches. Negative for seizures, weakness and numbness. Psychiatric/Behavioral: Negative for confusion.      PASTMEDICAL HISTORY     Past Medical History:   Diagnosis Date    CKD (chronic kidney disease)     Diabetes mellitus ([facility] [address]     Headache     Hypertension      SURGICAL HISTORY       Past Surgical History:   Procedure Laterality Date    KIDNEY BIOPSY      2/2020    [place] SONO GUIDE NEEDLE BIOPSY      on thyroid 11/19    ROTATOR CUFF REPAIR      ROTATOR CUFF REPAIR Right CURRENT MEDICATIONS       Previous Medications    ACETAMINOPHEN (TYLENOL) 325 MG TABLET    Take 2 tablets by mouth every 6 hours as needed for Pain    AMLODIPINE (NORVASC) 5 MG TABLET    Take 1 tablet by mouth daily    GLIMEPIRIDE (AMARYL) 4 MG TABLET    Take 8 mg by mouth every morning (before breakfast) Takes 2 tabs (=8mg) once daily    INSULIN GLARGINE (LANTUS) 100 UNIT/ML INJECTION VIAL    Inject 25 Units into the skin every morning     LISINOPRIL (PRINIVIL) 20 MG TABLET    Take 1 tablet by mouth daily    ONDANSETRON (ZOFRAN ODT) 4 MG DISINTEGRATING TABLET    Take 1 tablet by mouth every 8 hours as needed for Nausea    PSYLLIUM (KONSYL) 28.3 % PACK    Take 1 packet by mouth daily as needed for Constipation    RIZATRIPTAN (MAXALT) 5 MG TABLET    Take 5 mg by mouth once as needed for Migraine May repeat in 2 hours if needed    ROSUVASTATIN (CRESTOR) 10 MG TABLET    Take 10 mg by mouth daily    TOPIRAMATE (TOPAMAX) 50 MG TABLET    Take 50 mg by mouth 2 times daily    VITAMIN D (CHOLECALCIFEROL) 25 MCG (1000 UT) TABS TABLET    Take 1,000 Units by mouth daily     ALLERGIES     is allergic to dexamethasone. FAMILY HISTORY     He indicated that his mother is alive. He indicated that his father is alive. SOCIAL HISTORY       Social History     Tobacco Use    Smoking status: Never Smoker    Smokeless tobacco: Never Used   Substance Use Topics    Alcohol use: Yes     Comment: occas    Drug use: No     PHYSICAL EXAM     INITIAL VITALS: BP (!) 171/91   Pulse 84   Temp 97.8 °F (36.6 °C) (Oral)   Resp 16   Ht 5' 9\" (1.753 m)   Wt 251 lb (113.9 kg)   SpO2 98%   BMI 37.07 kg/m²    Physical Exam  Vitals signs and nursing note reviewed. Constitutional:       General: He is not in acute distress. Appearance: He is not ill-appearing, toxic-appearing or diaphoretic. Comments: Appears uncomfortable   HENT:      Head: Normocephalic and atraumatic.       Mouth/Throat:      Mouth: Mucous membranes are moist.      Pharynx: Oropharynx is clear. Eyes:      General: No visual field deficit or scleral icterus. Extraocular Movements: Extraocular movements intact. Pupils: Pupils are equal, round, and reactive to light. Neck:      Musculoskeletal: Normal range of motion and neck supple. No neck rigidity. Cardiovascular:      Rate and Rhythm: Normal rate and regular rhythm. Pulses: Normal pulses. Pulmonary:      Effort: Pulmonary effort is normal. No respiratory distress. Abdominal:      General: There is no distension. Palpations: Abdomen is soft. Tenderness: There is no abdominal tenderness. Musculoskeletal: Normal range of motion. General: No deformity. Skin:     General: Skin is warm and dry. Capillary Refill: Capillary refill takes less than 2 seconds. Findings: No rash. Neurological:      General: No focal deficit present. Mental Status: He is alert and oriented to person, place, and time. GCS: GCS eye subscore is 4. GCS verbal subscore is 5. GCS motor subscore is 6. Cranial Nerves: No cranial nerve deficit, dysarthria or facial asymmetry. Sensory: Sensation is intact. Motor: Motor function is intact. No pronator drift. Coordination: Finger-Nose-Finger Test normal.      Gait: Gait is intact. Comments: Cranial nerves II through XII intact grossly. Conjugate gaze. Psychiatric:         Thought Content: Thought content normal.         MEDICAL DECISION MAKING:          Please see ED Course below for MDM/ED course. DDx: Headache, subarachnoid hemorrhage, intracranial mass, meningitis    All patient's question's and concerns were answered prior to disposition and patient and/or family expressed understanding and agreement of treatment plan.        NIH STROKE SCALE:            PROCEDURES:    Procedures    DIAGNOSTIC RESULTS   EKG:All EKG's are interpreted by the Emergency Department Physician who either signs or Co-signs this chart in the absence of a cardiologist.    RADIOLOGY:All plain film, CT, MRI, and formal ultrasound images (except ED bedside ultrasound) are read by the radiologist, see reports below, unless otherwisenoted in MDM or here. No orders to display     LABS: All lab results were reviewed by myself, and all abnormals are listed below. Labs Reviewed   POC GLUCOSE FINGERSTICK - Abnormal; Notable for the following components:       Result Value    POC Glucose 176 (*)     All other components within normal limits   POCT GLUCOSE - Normal       EMERGENCY DEPARTMENTCOURSE:     Patient is a 50-year-old male here with headache. Worsening over the past day and a half not the worst of his life not sudden onset. Denies any neck pain or fevers or trauma. He has no focal neurologic deficits on exam.  He is afebrile. Appears uncomfortable. Will give migraine cocktail, steroids, Fioricet, and check a glucose as he did mention his glucose was in the 500s yesterday evening. Glucose 176. Patient reassessed after migraine cocktail and Fioricet the appears much more comfortable he is resting. Significant improvement of his headache. He will be getting a ride home from his wife. We will give him Fioricet as needed for headaches and have him follow-up with his doctor. Strict return precautions given.     Vitals:    Vitals:    03/09/21 0723   BP: (!) 171/91   Pulse: 84   Resp: 16   Temp: 97.8 °F (36.6 °C)   TempSrc: Oral   SpO2: 98%   Weight: 251 lb (113.9 kg)   Height: 5' 9\" (1.753 m)       The patient was given the following medications while in the emergency department:  Orders Placed This Encounter   Medications    metoclopramide (REGLAN) injection 10 mg    diphenhydrAMINE (BENADRYL) injection 25 mg    butalbital-acetaminophen-caffeine (FIORICET, ESGIC) per tablet 2 tablet    methylPREDNISolone sodium (SOLU-MEDROL) injection 40 mg    0.9 % sodium chloride bolus    butalbital-acetaminophen-caffeine (FIORICET, ESGIC) -40 MG per tablet     Sig: Take 1 tablet by mouth every 4 hours as needed for Headaches     Dispense:  30 tablet     Refill:  0     CONSULTS:  None    FINAL IMPRESSION      1. Nonintractable headache, unspecified chronicity pattern, unspecified headache type          DISPOSITION/PLAN   DISPOSITION Decision To Discharge 03/09/2021 09:12:31 AM      PATIENT REFERRED TO:  Momo Nicholas MD  [address] [place] [address] [id number]    Schedule an appointment as soon as possible for a visit       [facility] ED  [address] [facility]  [phone]    If symptoms worsen    DISCHARGE MEDICATIONS:  New Prescriptions    BUTALBITAL-ACETAMINOPHEN-CAFFEINE (FIORICET, ESGIC) -40 MG PER TABLET    Take 1 tablet by mouth every 4 hours as needed for Headaches     Derek Silverman MD  Attending Emergency Physician    This note was created with the assistance of a speech-recognition program. While intending to generate a document that actually reflects the content of the visit, no guarantees can be provided that every mistake has been identified and corrected by editing.                     Derek Silverman MD  03/09/21 [id number]

## 2021-03-15 ENCOUNTER — APPOINTMENT (OUTPATIENT)
Dept: GENERAL RADIOLOGY | Age: 56
End: 2021-03-15
Payer: COMMERCIAL

## 2021-03-15 ENCOUNTER — HOSPITAL ENCOUNTER (EMERGENCY)
Age: 56
Discharge: HOME | End: 2021-03-15
Attending: EMERGENCY MEDICINE
Payer: COMMERCIAL

## 2021-03-15 VITALS
BODY MASS INDEX: 36.88 KG/M2 | HEART RATE: 75 BPM | OXYGEN SATURATION: 100 % | RESPIRATION RATE: 20 BRPM | WEIGHT: 249 LBS | HEIGHT: 69 IN | TEMPERATURE: 98.6 F | DIASTOLIC BLOOD PRESSURE: 95 MMHG | SYSTOLIC BLOOD PRESSURE: 164 MMHG

## 2021-03-15 DIAGNOSIS — R60.9 DEPENDENT EDEMA: Primary | ICD-10-CM

## 2021-03-15 LAB
ABSOLUTE EOS #: 0.16 K/UL (ref 0–0.44)
ABSOLUTE IMMATURE GRANULOCYTE: 0.04 K/UL (ref 0–0.3)
ABSOLUTE LYMPH #: 1.02 K/UL (ref 1.1–3.7)
ABSOLUTE MONO #: 0.33 K/UL (ref 0.1–1.2)
ALBUMIN SERPL-MCNC: 3.4 G/DL (ref 3.5–5.2)
ALBUMIN/GLOBULIN RATIO: ABNORMAL (ref 1–2.5)
ALP BLD-CCNC: 76 U/L (ref 40–129)
ALT SERPL-CCNC: 27 U/L (ref 5–41)
ANION GAP SERPL CALCULATED.3IONS-SCNC: 10 MMOL/L (ref 9–17)
AST SERPL-CCNC: <5 U/L
BASOPHILS # BLD: 1 % (ref 0–2)
BASOPHILS ABSOLUTE: 0.05 K/UL (ref 0–0.2)
BILIRUB SERPL-MCNC: 0.16 MG/DL (ref 0.3–1.2)
BILIRUBIN DIRECT: 0.1 MG/DL
BILIRUBIN, INDIRECT: 0.06 MG/DL (ref 0–1)
BNP INTERPRETATION: NORMAL
BUN BLDV-MCNC: 23 MG/DL (ref 6–20)
BUN/CREAT BLD: 11 (ref 9–20)
CALCIUM SERPL-MCNC: 8.5 MG/DL (ref 8.6–10.4)
CHLORIDE BLD-SCNC: 102 MMOL/L (ref 98–107)
CO2: 23 MMOL/L (ref 20–31)
CREAT SERPL-MCNC: 2.05 MG/DL (ref 0.7–1.2)
D-DIMER QUANTITATIVE: 0.64 MG/L FEU (ref 0–0.59)
DIFFERENTIAL TYPE: ABNORMAL
EOSINOPHILS RELATIVE PERCENT: 2 % (ref 1–4)
GFR AFRICAN AMERICAN: 41 ML/MIN
GFR NON-AFRICAN AMERICAN: 34 ML/MIN
GFR SERPL CREATININE-BSD FRML MDRD: ABNORMAL ML/MIN/{1.73_M2}
GFR SERPL CREATININE-BSD FRML MDRD: ABNORMAL ML/MIN/{1.73_M2}
GLOBULIN: ABNORMAL G/DL (ref 1.5–3.8)
GLUCOSE BLD-MCNC: 296 MG/DL (ref 70–99)
HCT VFR BLD CALC: 33 % (ref 40.7–50.3)
HEMOGLOBIN: 10.9 G/DL (ref 13–17)
IMMATURE GRANULOCYTES: 1 %
LYMPHOCYTES # BLD: 15 % (ref 24–43)
MCH RBC QN AUTO: 27.9 PG (ref 25.2–33.5)
MCHC RBC AUTO-ENTMCNC: 33 G/DL (ref 28.4–34.8)
MCV RBC AUTO: 84.4 FL (ref 82.6–102.9)
MONOCYTES # BLD: 5 % (ref 3–12)
NRBC AUTOMATED: 0 PER 100 WBC
PDW BLD-RTO: 13.2 % (ref 11.8–14.4)
PLATELET # BLD: 234 K/UL (ref 138–453)
PLATELET ESTIMATE: ABNORMAL
PMV BLD AUTO: 9.3 FL (ref 8.1–13.5)
POTASSIUM SERPL-SCNC: 4.3 MMOL/L (ref 3.7–5.3)
PRO-BNP: 137 PG/ML
RBC # BLD: 3.91 M/UL (ref 4.21–5.77)
RBC # BLD: ABNORMAL 10*6/UL
SEG NEUTROPHILS: 76 % (ref 36–65)
SEGMENTED NEUTROPHILS ABSOLUTE COUNT: 5.38 K/UL (ref 1.5–8.1)
SODIUM BLD-SCNC: 135 MMOL/L (ref 135–144)
TOTAL PROTEIN: 6.3 G/DL (ref 6.4–8.3)
WBC # BLD: 7 K/UL (ref 3.5–11.3)
WBC # BLD: ABNORMAL 10*3/UL

## 2021-03-15 PROCEDURE — 71045 X-RAY EXAM CHEST 1 VIEW: CPT

## 2021-03-15 PROCEDURE — 80048 BASIC METABOLIC PNL TOTAL CA: CPT

## 2021-03-15 PROCEDURE — 85025 COMPLETE CBC W/AUTO DIFF WBC: CPT

## 2021-03-15 PROCEDURE — 99282 EMERGENCY DEPT VISIT SF MDM: CPT

## 2021-03-15 PROCEDURE — 83880 ASSAY OF NATRIURETIC PEPTIDE: CPT

## 2021-03-15 PROCEDURE — 80076 HEPATIC FUNCTION PANEL: CPT

## 2021-03-15 PROCEDURE — 85379 FIBRIN DEGRADATION QUANT: CPT

## 2021-03-15 PROCEDURE — 93971 EXTREMITY STUDY: CPT

## 2021-03-15 ASSESSMENT — ENCOUNTER SYMPTOMS
ABDOMINAL PAIN: 0
SHORTNESS OF BREATH: 0
BACK PAIN: 0
COLOR CHANGE: 0
COUGH: 0

## 2021-03-15 NOTE — ED PROVIDER NOTES
[address] ED  eMERGENCY dEPARTMENT eNCOUnter      Pt Name: Hola Medina  MRN: [id number]  [place] 1965  Date of evaluation: 3/15/2021  Provider: ROBERT Schuler [id number]       Chief Complaint   Patient presents with    Leg Swelling         HISTORY OF PRESENT ILLNESS  (Location/Symptom, Timing/Onset, Context/Setting, Quality, Duration, Modifying Factors, Severity.)   Hayes Beck is a 48 y.o. male who presents to the emergency department via private auto for bilateral lower leg swelling, abrasions, stiffness. Onset was today. Denies fever, chills, injury, drainage, SOB. Rates his pain 0/10 at this time. Nursing Notes were reviewed.     ALLERGIES     Dexamethasone    CURRENT MEDICATIONS       Previous Medications    ACETAMINOPHEN (TYLENOL) 325 MG TABLET    Take 2 tablets by mouth every 6 hours as needed for Pain    AMLODIPINE (NORVASC) 5 MG TABLET    Take 1 tablet by mouth daily    BUTALBITAL-ACETAMINOPHEN-CAFFEINE (FIORICET, ESGIC) -40 MG PER TABLET    Take 1 tablet by mouth every 4 hours as needed for Headaches    GLIMEPIRIDE (AMARYL) 4 MG TABLET    Take 8 mg by mouth every morning (before breakfast) Takes 2 tabs (=8mg) once daily    INSULIN GLARGINE (LANTUS) 100 UNIT/ML INJECTION VIAL    Inject 25 Units into the skin every morning     LISINOPRIL (PRINIVIL) 20 MG TABLET    Take 1 tablet by mouth daily    ONDANSETRON (ZOFRAN ODT) 4 MG DISINTEGRATING TABLET    Take 1 tablet by mouth every 8 hours as needed for Nausea    PSYLLIUM (KONSYL) 28.3 % PACK    Take 1 packet by mouth daily as needed for Constipation    RIZATRIPTAN (MAXALT) 5 MG TABLET    Take 5 mg by mouth once as needed for Migraine May repeat in 2 hours if needed    ROSUVASTATIN (CRESTOR) 10 MG TABLET    Take 10 mg by mouth daily    TOPIRAMATE (TOPAMAX) 50 MG TABLET    Take 50 mg by mouth 2 times daily    VITAMIN D (CHOLECALCIFEROL) 25 MCG (1000 UT) TABS TABLET    Take 1,000 Units by mouth daily PAST MEDICAL HISTORY         Diagnosis Date    CKD (chronic kidney disease)     Diabetes mellitus ([facility] [address]     Headache     Hypertension        SURGICAL HISTORY           Procedure Laterality Date    KIDNEY BIOPSY      2/2020    [place] SONO GUIDE NEEDLE BIOPSY      on thyroid 11/19    ROTATOR CUFF REPAIR      ROTATOR CUFF REPAIR Right          FAMILY HISTORY           Problem Relation Age of Onset    Heart Disease Mother     Cancer Mother      Family Status   Relation Name Status    Mother  Alive    Father  Alive        SOCIAL HISTORY      reports that he has never smoked. He has never used smokeless tobacco. He reports current alcohol use. He reports that he does not use drugs. REVIEW OF SYSTEMS    (2-9 systems for level 4, 10 or more for level 5)     Review of Systems   Constitutional: Negative for chills, diaphoresis, fatigue and fever. Respiratory: Negative for cough and shortness of breath. Cardiovascular: Positive for leg swelling. Negative for chest pain and palpitations. Gastrointestinal: Negative for abdominal pain. Musculoskeletal: Positive for myalgias. Negative for arthralgias and back pain. Skin: Positive for wound. Negative for color change and rash. Neurological: Negative for dizziness, weakness, light-headedness and headaches. Except as noted above the remainder of the review of systems was reviewed and negative. PHYSICAL EXAM    (up to 7 for level 4, 8 or more for level 5)     ED Triage Vitals [03/15/21 1512]   BP Temp Temp Source Pulse Resp SpO2 Height Weight   (!) 164/95 98.6 °F (37 °C) Oral 75 20 100 % 5' 9\" (1.753 m) 249 lb (112.9 kg)     Physical Exam  Vitals signs reviewed. Constitutional:       General: He is not in acute distress. Appearance: He is well-developed. He is not diaphoretic. Eyes:      General: No scleral icterus. Conjunctiva/sclera: Conjunctivae normal.   Neck:      Vascular: No JVD.    Cardiovascular:      Rate and Rhythm: Normal rate.      Pulses: Normal pulses. Pulmonary:      Effort: Pulmonary effort is normal. No respiratory distress. Musculoskeletal:      Right lower leg: Edema (1+) present. Left lower leg: Edema (1+) present. Comments: Moves extremities   Skin:     General: Skin is warm and dry. Capillary Refill: Capillary refill takes less than 2 seconds. Findings: Abrasion (to bilateral calf areas) present. No rash. Comments: Chronic skin changes to bilateral lower legs. Neurological:      Mental Status: He is alert and oriented to person, place, and time. Psychiatric:         Behavior: Behavior normal.         DIAGNOSTIC RESULTS     RADIOLOGY:   Non-plain film images such as CT, Ultrasound and MRI are read by the radiologist. Verdi Nickel radiographic images are visualized and preliminarily interpreted by the emergency physician with the below findings:    Interpretation per the Radiologist below, if available at the time of this note:    Xr Chest Portable    Result Date: 3/15/2021  EXAMINATION: ONE XRAY VIEW OF THE CHEST 3/15/2021 3:25 pm COMPARISON: 03/04/2020 HISTORY: ORDERING SYSTEM PROVIDED HISTORY: edema TECHNOLOGIST PROVIDED HISTORY: edema Reason for Exam: leg numbness, tingling and swelling. no current chest complaintss Acuity: Unknown Type of Exam: Unknown FINDINGS: Lordotic positioning. The cardiomediastinal silhouette is within normal limits. There is no consolidation, pneumothorax or evidence for edema. No evidence for effusion. No acute osseous abnormality is identified. No acute airspace disease identified.        LABS:  Labs Reviewed   BASIC METABOLIC PANEL - Abnormal; Notable for the following components:       Result Value    Glucose 296 (*)     BUN 23 (*)     CREATININE 2.05 (*)     Calcium 8.5 (*)     GFR Non- 34 (*)     GFR  41 (*)     All other components within normal limits   CBC WITH AUTO DIFFERENTIAL - Abnormal; Notable for the following components:    RBC 3.91 (*)     Hemoglobin 10.9 (*)     Hematocrit 33.0 (*)     Seg Neutrophils 76 (*)     Lymphocytes 15 (*)     Immature Granulocytes 1 (*)     Absolute Lymph # 1.02 (*)     All other components within normal limits   D-DIMER, QUANTITATIVE - Abnormal; Notable for the following components:    D-Dimer, Quant 0.64 (*)     All other components within normal limits   HEPATIC FUNCTION PANEL - Abnormal; Notable for the following components:    Albumin 3.4 (*)     Total Bilirubin 0.16 (*)     Total Protein 6.3 (*)     All other components within normal limits   BRAIN NATRIURETIC PEPTIDE       All other labs were within normal range or not returned as of this dictation. EMERGENCY DEPARTMENT COURSE and DIFFERENTIAL DIAGNOSIS/MDM:   Vitals:    Vitals:    03/15/21 1512   BP: (!) 164/95   Pulse: 75   Resp: 20   Temp: 98.6 °F (37 °C)   TempSrc: Oral   SpO2: 100%   Weight: 249 lb (112.9 kg)   Height: 5' 9\" (1.753 m)       CLINICAL DECISION MAKING:  The patient presented alert with a nontoxic appearance and was seen in conjunction with Dr. Bloom. Venous doppler was negative. Laboratory studies were unremarkable. Follow up with pcp and vascular, return to ED if condition worsens. FINAL IMPRESSION      1.  Dependent edema            Problem List  Patient Active Problem List   Diagnosis Code    Severe frontal headaches R51.9    Blurring of vision H53.8    Intractable migraine G43.919    Type 2 diabetes mellitus with stage 3 chronic kidney disease, with long-term current use of insulin ([facility]) E11.22, N18.30, Z79.4    Headache R51.9    Acute kidney injury superimposed on chronic kidney disease ([facility] [address] N17.9, N18.9    Stage 3 chronic kidney disease N18.30    Atypical chest pain R07.89    Essential hypertension I10    Class 2 severe obesity due to excess calories with serious comorbidity and body mass index (BMI) of 35.0 to 35.9 in adult ([facility] [address] E66.01, Z68.35         DISPOSITION/PLAN   DISPOSITION DISCHARGE      PATIENT REFERRED TO:   Sabas Yusuf MD  [address] [facility] [address] [phone]    Schedule an appointment as soon as possible for a visit       Onofre Carolina MD  [address] [facility] [address] [facility]  [phone]    Schedule an appointment as soon as possible for a visit       [facility] ED  [address] [facility]  [phone]    As needed, If symptoms worsen      DISCHARGE MEDICATIONS:     New Prescriptions    No medications on file           (Please note that portions of this note were completed with a voice recognition program.  Efforts were made to edit the dictations but occasionally words are mis-transcribed.)    ROBERT Brandt - ROBERT Marina CNP  03/15/21 6545

## 2021-03-15 NOTE — ED PROVIDER NOTES
EMERGENCY DEPARTMENT ENCOUNTER   ATTENDING ATTESTATION     Pt Name: Brianna Dominguez  MRN: [id number]  [place] 1965  Date of evaluation: 3/15/21   Hayes Dhaliwal is a 48 y.o. male with CC: Leg Swelling    MDM:   The patient is a 41-year-old male who presented to the emergency department secondary to bilateral leg swelling. Negative for DVT BMP within normal limits. Patient will be discharged home with outpatient vascular follow-up and given parameters to return to the emergency department. CRITICAL CARE:       EKG: All EKG's are interpreted by the Emergency Department Physician who either signs or Co-signs this chart in the absence of a cardiologist.      RADIOLOGY:All plain film, CT, MRI, and formal ultrasound images (except ED bedside ultrasound) are read by the radiologist, see reports below, unless otherwise noted in MDM or here. XR CHEST PORTABLE   Final Result   No acute airspace disease identified. VL DUP LOWER EXTREMITY VENOUS BILATERAL    (Results Pending)     LABS: All lab results were reviewed by myself, and all abnormals are listed below.   Labs Reviewed   BASIC METABOLIC PANEL - Abnormal; Notable for the following components:       Result Value    Glucose 296 (*)     BUN 23 (*)     CREATININE 2.05 (*)     Calcium 8.5 (*)     GFR Non- 34 (*)     GFR  41 (*)     All other components within normal limits   CBC WITH AUTO DIFFERENTIAL - Abnormal; Notable for the following components:    RBC 3.91 (*)     Hemoglobin 10.9 (*)     Hematocrit 33.0 (*)     Seg Neutrophils 76 (*)     Lymphocytes 15 (*)     Immature Granulocytes 1 (*)     Absolute Lymph # 1.02 (*)     All other components within normal limits   D-DIMER, QUANTITATIVE - Abnormal; Notable for the following components:    D-Dimer, Quant 0.64 (*)     All other components within normal limits   HEPATIC FUNCTION PANEL - Abnormal; Notable for the following components:    Albumin 3.4 (*)     Total Bilirubin 0.16 (*)     Total Protein 6.3 (*)     All other components within normal limits   BRAIN NATRIURETIC PEPTIDE     CONSULTS:  None  FINAL IMPRESSION      1.  Dependent edema            PASTMEDICAL HISTORY     Past Medical History:   Diagnosis Date    CKD (chronic kidney disease)     Diabetes mellitus ([facility] [address]     Headache     Hypertension      SURGICAL HISTORY       Past Surgical History:   Procedure Laterality Date    KIDNEY BIOPSY      2/2020    [place] SONO GUIDE NEEDLE BIOPSY      on thyroid 11/19    ROTATOR CUFF REPAIR      ROTATOR CUFF REPAIR Right      CURRENT MEDICATIONS       Discharge Medication List as of 3/15/2021  7:02 PM      CONTINUE these medications which have NOT CHANGED    Details   butalbital-acetaminophen-caffeine (FIORICET, ESGIC) -40 MG per tablet Take 1 tablet by mouth every 4 hours as needed for Headaches, Disp-30 tablet, R-0Normal      acetaminophen (TYLENOL) 325 MG tablet Take 2 tablets by mouth every 6 hours as needed for Pain, Disp-20 tablet, R-1Normal      lisinopril (PRINIVIL) 20 MG tablet Take 1 tablet by mouth daily, Disp-30 tablet, R-0Normal      amLODIPine (NORVASC) 5 MG tablet Take 1 tablet by mouth daily, Disp-30 tablet, R-3Normal      topiramate (TOPAMAX) 50 MG tablet Take 50 mg by mouth 2 times dailyHistorical Med      rizatriptan (MAXALT) 5 MG tablet Take 5 mg by mouth once as needed for Migraine May repeat in 2 hours if neededHistorical Med      vitamin D (CHOLECALCIFEROL) 25 MCG (1000 UT) TABS tablet Take 1,000 Units by mouth dailyHistorical Med      psyllium (KONSYL) 28.3 % PACK Take 1 packet by mouth daily as needed for ConstipationHistorical Med      rosuvastatin (CRESTOR) 10 MG tablet Take 10 mg by mouth dailyHistorical Med      ondansetron (ZOFRAN ODT) 4 MG disintegrating tablet Take 1 tablet by mouth every 8 hours as needed for Nausea, Disp-20 tablet, R-0Print      glimepiride (AMARYL) 4 MG tablet Take 8 mg by mouth every morning (before breakfast) Takes 2 tabs (=8mg) once dailyHistorical Med      insulin glargine (LANTUS) 100 UNIT/ML injection vial Inject 25 Units into the skin every morning Historical Med           ALLERGIES     is allergic to dexamethasone. FAMILY HISTORY     He indicated that his mother is alive. He indicated that his father is alive. SOCIAL HISTORY       Social History     Tobacco Use    Smoking status: Never Smoker    Smokeless tobacco: Never Used   Substance Use Topics    Alcohol use: Yes     Comment: occas    Drug use: No       I personally evaluated and examined the patient in conjunction with the APC and agree with the assessment, treatment plan, and disposition of the patient as recorded by the APC.    Saturnino Calhoun MD  Attending Emergency Physician         Saturnino Calhoun MD  70/16/74 5730

## 2021-03-15 NOTE — LETTER
[facility] ED  [address] [place] [address]  Phone: [phone]             March 15, 2021    Patient: Cecilio Coombs   YOB: 1965   Date of Visit: 3/15/2021       To Whom It May Concern:    Madhu Spencer was seen and treated in our emergency department on 3/15/2021. He may return to work on 3/18/21.       Sincerely,             Signature:__________________________________

## 2021-07-26 ENCOUNTER — APPOINTMENT (OUTPATIENT)
Dept: CT IMAGING | Age: 56
End: 2021-07-26
Payer: COMMERCIAL

## 2021-07-26 ENCOUNTER — APPOINTMENT (OUTPATIENT)
Dept: GENERAL RADIOLOGY | Age: 56
End: 2021-07-26
Payer: COMMERCIAL

## 2021-07-26 ENCOUNTER — HOSPITAL ENCOUNTER (EMERGENCY)
Age: 56
Discharge: HOME | End: 2021-07-26
Attending: EMERGENCY MEDICINE
Payer: COMMERCIAL

## 2021-07-26 VITALS
WEIGHT: 238.1 LBS | DIASTOLIC BLOOD PRESSURE: 99 MMHG | TEMPERATURE: 97.9 F | OXYGEN SATURATION: 94 % | SYSTOLIC BLOOD PRESSURE: 168 MMHG | HEART RATE: 73 BPM | HEIGHT: 69 IN | BODY MASS INDEX: 35.27 KG/M2 | RESPIRATION RATE: 15 BRPM

## 2021-07-26 DIAGNOSIS — R73.9 HYPERGLYCEMIA WITHOUT KETOSIS: ICD-10-CM

## 2021-07-26 DIAGNOSIS — R10.13 EPIGASTRIC PAIN: Primary | ICD-10-CM

## 2021-07-26 LAB
ABSOLUTE EOS #: 0.2 K/UL (ref 0–0.44)
ABSOLUTE IMMATURE GRANULOCYTE: 0.04 K/UL (ref 0–0.3)
ABSOLUTE LYMPH #: 0.92 K/UL (ref 1.1–3.7)
ABSOLUTE MONO #: 0.44 K/UL (ref 0.1–1.2)
ALBUMIN SERPL-MCNC: 3.8 G/DL (ref 3.5–5.2)
ALBUMIN/GLOBULIN RATIO: NORMAL (ref 1–2.5)
ALP BLD-CCNC: 110 U/L (ref 40–129)
ALT SERPL-CCNC: 23 U/L (ref 5–41)
ANION GAP SERPL CALCULATED.3IONS-SCNC: 14 MMOL/L (ref 9–17)
AST SERPL-CCNC: 15 U/L
BASOPHILS # BLD: 1 % (ref 0–2)
BASOPHILS ABSOLUTE: 0.06 K/UL (ref 0–0.2)
BILIRUB SERPL-MCNC: 0.32 MG/DL (ref 0.3–1.2)
BILIRUBIN DIRECT: 0.09 MG/DL
BILIRUBIN, INDIRECT: 0.23 MG/DL (ref 0–1)
BUN BLDV-MCNC: 29 MG/DL (ref 6–20)
BUN/CREAT BLD: 13 (ref 9–20)
CALCIUM SERPL-MCNC: 9.5 MG/DL (ref 8.6–10.4)
CHLORIDE BLD-SCNC: 92 MMOL/L (ref 98–107)
CHP ED QC CHECK: NORMAL
CO2: 22 MMOL/L (ref 20–31)
CREAT SERPL-MCNC: 2.24 MG/DL (ref 0.7–1.2)
DIFFERENTIAL TYPE: ABNORMAL
EOSINOPHILS RELATIVE PERCENT: 2 % (ref 1–4)
GFR AFRICAN AMERICAN: 37 ML/MIN
GFR NON-AFRICAN AMERICAN: 31 ML/MIN
GFR SERPL CREATININE-BSD FRML MDRD: ABNORMAL ML/MIN/{1.73_M2}
GFR SERPL CREATININE-BSD FRML MDRD: ABNORMAL ML/MIN/{1.73_M2}
GLOBULIN: NORMAL G/DL (ref 1.5–3.8)
GLUCOSE BLD-MCNC: 422 MG/DL (ref 75–110)
GLUCOSE BLD-MCNC: 475 MG/DL
GLUCOSE BLD-MCNC: 475 MG/DL (ref 75–110)
GLUCOSE BLD-MCNC: 514 MG/DL (ref 70–99)
HCT VFR BLD CALC: 35.7 % (ref 40.7–50.3)
HEMOGLOBIN: 12.3 G/DL (ref 13–17)
IMMATURE GRANULOCYTES: 1 %
LACTIC ACID: 0.8 MMOL/L (ref 0.5–2.2)
LACTIC ACID: 1 MMOL/L (ref 0.5–2.2)
LIPASE: 66 U/L (ref 13–60)
LYMPHOCYTES # BLD: 11 % (ref 24–43)
MAGNESIUM: 1.8 MG/DL (ref 1.6–2.6)
MCH RBC QN AUTO: 27.8 PG (ref 25.2–33.5)
MCHC RBC AUTO-ENTMCNC: 34.5 G/DL (ref 28.4–34.8)
MCV RBC AUTO: 80.8 FL (ref 82.6–102.9)
MONOCYTES # BLD: 5 % (ref 3–12)
NRBC AUTOMATED: 0 PER 100 WBC
PDW BLD-RTO: 12.6 % (ref 11.8–14.4)
PLATELET # BLD: 227 K/UL (ref 138–453)
PLATELET ESTIMATE: ABNORMAL
PMV BLD AUTO: 10.6 FL (ref 8.1–13.5)
POTASSIUM SERPL-SCNC: 4.4 MMOL/L (ref 3.7–5.3)
RBC # BLD: 4.42 M/UL (ref 4.21–5.77)
RBC # BLD: ABNORMAL 10*6/UL
SEG NEUTROPHILS: 80 % (ref 36–65)
SEGMENTED NEUTROPHILS ABSOLUTE COUNT: 7.04 K/UL (ref 1.5–8.1)
SODIUM BLD-SCNC: 128 MMOL/L (ref 135–144)
TOTAL PROTEIN: 6.9 G/DL (ref 6.4–8.3)
TROPONIN INTERP: NORMAL
TROPONIN INTERP: NORMAL
TROPONIN T: NORMAL NG/ML
TROPONIN T: NORMAL NG/ML
TROPONIN, HIGH SENSITIVITY: 17 NG/L (ref 0–22)
TROPONIN, HIGH SENSITIVITY: 19 NG/L (ref 0–22)
WBC # BLD: 8.7 K/UL (ref 3.5–11.3)
WBC # BLD: ABNORMAL 10*3/UL

## 2021-07-26 PROCEDURE — 93005 ELECTROCARDIOGRAM TRACING: CPT | Performed by: EMERGENCY MEDICINE

## 2021-07-26 PROCEDURE — 83605 ASSAY OF LACTIC ACID: CPT

## 2021-07-26 PROCEDURE — 80048 BASIC METABOLIC PNL TOTAL CA: CPT

## 2021-07-26 PROCEDURE — 82947 ASSAY GLUCOSE BLOOD QUANT: CPT

## 2021-07-26 PROCEDURE — 96374 THER/PROPH/DIAG INJ IV PUSH: CPT

## 2021-07-26 PROCEDURE — 6360000002 HC RX W HCPCS: Performed by: EMERGENCY MEDICINE

## 2021-07-26 PROCEDURE — C9113 INJ PANTOPRAZOLE SODIUM, VIA: HCPCS | Performed by: EMERGENCY MEDICINE

## 2021-07-26 PROCEDURE — 83690 ASSAY OF LIPASE: CPT

## 2021-07-26 PROCEDURE — 84484 ASSAY OF TROPONIN QUANT: CPT

## 2021-07-26 PROCEDURE — 74177 CT ABD & PELVIS W/CONTRAST: CPT

## 2021-07-26 PROCEDURE — 99283 EMERGENCY DEPT VISIT LOW MDM: CPT

## 2021-07-26 PROCEDURE — 83735 ASSAY OF MAGNESIUM: CPT

## 2021-07-26 PROCEDURE — 2580000003 HC RX 258: Performed by: EMERGENCY MEDICINE

## 2021-07-26 PROCEDURE — 6370000000 HC RX 637 (ALT 250 FOR IP): Performed by: EMERGENCY MEDICINE

## 2021-07-26 PROCEDURE — 96372 THER/PROPH/DIAG INJ SC/IM: CPT

## 2021-07-26 PROCEDURE — 80076 HEPATIC FUNCTION PANEL: CPT

## 2021-07-26 PROCEDURE — 36415 COLL VENOUS BLD VENIPUNCTURE: CPT

## 2021-07-26 PROCEDURE — 85025 COMPLETE CBC W/AUTO DIFF WBC: CPT

## 2021-07-26 PROCEDURE — 6360000004 HC RX CONTRAST MEDICATION: Performed by: EMERGENCY MEDICINE

## 2021-07-26 PROCEDURE — 71045 X-RAY EXAM CHEST 1 VIEW: CPT

## 2021-07-26 PROCEDURE — 96375 TX/PRO/DX INJ NEW DRUG ADDON: CPT

## 2021-07-26 RX ORDER — SODIUM CHLORIDE 9 MG/ML
10 INJECTION INTRAVENOUS ONCE
Status: COMPLETED | OUTPATIENT
Start: 2021-07-26 | End: 2021-07-26

## 2021-07-26 RX ORDER — 0.9 % SODIUM CHLORIDE 0.9 %
1000 INTRAVENOUS SOLUTION INTRAVENOUS ONCE
Status: COMPLETED | OUTPATIENT
Start: 2021-07-26 | End: 2021-07-26

## 2021-07-26 RX ORDER — PANTOPRAZOLE SODIUM 20 MG/1
TABLET, DELAYED RELEASE ORAL
COMMUNITY
Start: 2021-07-21

## 2021-07-26 RX ORDER — VERAPAMIL HYDROCHLORIDE 240 MG/1
TABLET, FILM COATED, EXTENDED RELEASE ORAL
COMMUNITY
Start: 2021-05-06

## 2021-07-26 RX ORDER — ACETAMINOPHEN 500 MG
1000 TABLET ORAL ONCE
Status: COMPLETED | OUTPATIENT
Start: 2021-07-26 | End: 2021-07-26

## 2021-07-26 RX ORDER — SODIUM CHLORIDE 0.9 % (FLUSH) 0.9 %
10 SYRINGE (ML) INJECTION PRN
Status: DISCONTINUED | OUTPATIENT
Start: 2021-07-26 | End: 2021-07-26 | Stop reason: HOSPADM

## 2021-07-26 RX ORDER — ONDANSETRON 2 MG/ML
4 INJECTION INTRAMUSCULAR; INTRAVENOUS ONCE
Status: COMPLETED | OUTPATIENT
Start: 2021-07-26 | End: 2021-07-26

## 2021-07-26 RX ORDER — PANTOPRAZOLE SODIUM 40 MG/10ML
40 INJECTION, POWDER, LYOPHILIZED, FOR SOLUTION INTRAVENOUS ONCE
Status: COMPLETED | OUTPATIENT
Start: 2021-07-26 | End: 2021-07-26

## 2021-07-26 RX ORDER — MORPHINE SULFATE 4 MG/ML
4 INJECTION, SOLUTION INTRAMUSCULAR; INTRAVENOUS ONCE
Status: COMPLETED | OUTPATIENT
Start: 2021-07-26 | End: 2021-07-26

## 2021-07-26 RX ORDER — TADALAFIL 20 MG/1
TABLET ORAL
COMMUNITY
Start: 2021-02-25

## 2021-07-26 RX ORDER — FUROSEMIDE 40 MG/1
TABLET ORAL
COMMUNITY
Start: 2021-05-03

## 2021-07-26 RX ORDER — SUMATRIPTAN 100 MG/1
TABLET, FILM COATED ORAL
COMMUNITY
Start: 2021-07-21

## 2021-07-26 RX ORDER — 0.9 % SODIUM CHLORIDE 0.9 %
80 INTRAVENOUS SOLUTION INTRAVENOUS ONCE
Status: COMPLETED | OUTPATIENT
Start: 2021-07-26 | End: 2021-07-26

## 2021-07-26 RX ADMIN — INSULIN LISPRO 10 UNITS: 100 INJECTION, SOLUTION INTRAVENOUS; SUBCUTANEOUS at 03:55

## 2021-07-26 RX ADMIN — SODIUM CHLORIDE, PRESERVATIVE FREE 10 ML: 5 INJECTION INTRAVENOUS at 02:46

## 2021-07-26 RX ADMIN — SODIUM CHLORIDE 80 ML: 9 INJECTION, SOLUTION INTRAVENOUS at 02:45

## 2021-07-26 RX ADMIN — IOPAMIDOL 75 ML: 755 INJECTION, SOLUTION INTRAVENOUS at 02:45

## 2021-07-26 RX ADMIN — SODIUM CHLORIDE 1000 ML: 9 INJECTION, SOLUTION INTRAVENOUS at 03:55

## 2021-07-26 RX ADMIN — MORPHINE SULFATE 4 MG: 4 INJECTION, SOLUTION INTRAMUSCULAR; INTRAVENOUS at 05:21

## 2021-07-26 RX ADMIN — ONDANSETRON 4 MG: 2 INJECTION INTRAMUSCULAR; INTRAVENOUS at 01:58

## 2021-07-26 RX ADMIN — SODIUM CHLORIDE 1000 ML: 9 INJECTION, SOLUTION INTRAVENOUS at 01:58

## 2021-07-26 RX ADMIN — PANTOPRAZOLE SODIUM 40 MG: 40 INJECTION, POWDER, FOR SOLUTION INTRAVENOUS at 01:58

## 2021-07-26 RX ADMIN — SODIUM CHLORIDE, PRESERVATIVE FREE 10 ML: 5 INJECTION INTRAVENOUS at 01:58

## 2021-07-26 RX ADMIN — ACETAMINOPHEN 1000 MG: 500 TABLET ORAL at 03:55

## 2021-07-26 ASSESSMENT — ENCOUNTER SYMPTOMS
EYE REDNESS: 0
SHORTNESS OF BREATH: 0
ABDOMINAL PAIN: 1
DIARRHEA: 0
RHINORRHEA: 0
SORE THROAT: 0
COLOR CHANGE: 0
VOMITING: 1
EYE DISCHARGE: 0
COUGH: 0
NAUSEA: 1

## 2021-07-26 ASSESSMENT — PAIN SCALES - GENERAL
PAINLEVEL_OUTOF10: 8
PAINLEVEL_OUTOF10: 8

## 2021-07-28 LAB
EKG ATRIAL RATE: 78 BPM
EKG P AXIS: 39 DEGREES
EKG P-R INTERVAL: 174 MS
EKG Q-T INTERVAL: 370 MS
EKG QRS DURATION: 98 MS
EKG QTC CALCULATION (BAZETT): 421 MS
EKG R AXIS: 14 DEGREES
EKG T AXIS: 40 DEGREES
EKG VENTRICULAR RATE: 78 BPM

## 2021-07-28 PROCEDURE — 93010 ELECTROCARDIOGRAM REPORT: CPT | Performed by: INTERNAL MEDICINE

## 2021-11-17 ENCOUNTER — APPOINTMENT (OUTPATIENT)
Dept: CT IMAGING | Age: 56
End: 2021-11-17
Payer: COMMERCIAL

## 2021-11-17 ENCOUNTER — HOSPITAL ENCOUNTER (EMERGENCY)
Age: 56
Discharge: HOME | End: 2021-11-17
Attending: STUDENT IN AN ORGANIZED HEALTH CARE EDUCATION/TRAINING PROGRAM
Payer: COMMERCIAL

## 2021-11-17 VITALS
WEIGHT: 239 LBS | HEIGHT: 69 IN | TEMPERATURE: 97.7 F | HEART RATE: 73 BPM | SYSTOLIC BLOOD PRESSURE: 208 MMHG | RESPIRATION RATE: 18 BRPM | OXYGEN SATURATION: 100 % | DIASTOLIC BLOOD PRESSURE: 116 MMHG | BODY MASS INDEX: 35.4 KG/M2

## 2021-11-17 DIAGNOSIS — R51.9 NONINTRACTABLE HEADACHE, UNSPECIFIED CHRONICITY PATTERN, UNSPECIFIED HEADACHE TYPE: ICD-10-CM

## 2021-11-17 DIAGNOSIS — E13.319: ICD-10-CM

## 2021-11-17 DIAGNOSIS — H57.13 EYE PAIN, BILATERAL: Primary | ICD-10-CM

## 2021-11-17 LAB
ABSOLUTE EOS #: 0.29 K/UL (ref 0–0.44)
ABSOLUTE IMMATURE GRANULOCYTE: 0.06 K/UL (ref 0–0.3)
ABSOLUTE LYMPH #: 1.69 K/UL (ref 1.1–3.7)
ABSOLUTE MONO #: 0.56 K/UL (ref 0.1–1.2)
ANION GAP SERPL CALCULATED.3IONS-SCNC: 8 MMOL/L (ref 9–17)
BASOPHILS # BLD: 1 % (ref 0–2)
BASOPHILS ABSOLUTE: 0.05 K/UL (ref 0–0.2)
BUN BLDV-MCNC: 33 MG/DL (ref 6–20)
BUN/CREAT BLD: 13 (ref 9–20)
CALCIUM SERPL-MCNC: 8.9 MG/DL (ref 8.6–10.4)
CHLORIDE BLD-SCNC: 105 MMOL/L (ref 98–107)
CO2: 21 MMOL/L (ref 20–31)
CREAT SERPL-MCNC: 2.45 MG/DL (ref 0.7–1.2)
DIFFERENTIAL TYPE: ABNORMAL
EOSINOPHILS RELATIVE PERCENT: 3 % (ref 1–4)
GFR AFRICAN AMERICAN: 33 ML/MIN
GFR NON-AFRICAN AMERICAN: 28 ML/MIN
GFR SERPL CREATININE-BSD FRML MDRD: ABNORMAL ML/MIN/{1.73_M2}
GFR SERPL CREATININE-BSD FRML MDRD: ABNORMAL ML/MIN/{1.73_M2}
GLUCOSE BLD-MCNC: 195 MG/DL (ref 70–99)
HCT VFR BLD CALC: 34 % (ref 40.7–50.3)
HEMOGLOBIN: 11.8 G/DL (ref 13–17)
IMMATURE GRANULOCYTES: 1 %
LYMPHOCYTES # BLD: 20 % (ref 24–43)
MCH RBC QN AUTO: 28 PG (ref 25.2–33.5)
MCHC RBC AUTO-ENTMCNC: 34.7 G/DL (ref 28.4–34.8)
MCV RBC AUTO: 80.6 FL (ref 82.6–102.9)
MONOCYTES # BLD: 7 % (ref 3–12)
NRBC AUTOMATED: 0 PER 100 WBC
PDW BLD-RTO: 13.6 % (ref 11.8–14.4)
PLATELET # BLD: 228 K/UL (ref 138–453)
PLATELET ESTIMATE: ABNORMAL
PMV BLD AUTO: 9.5 FL (ref 8.1–13.5)
POTASSIUM SERPL-SCNC: 3.9 MMOL/L (ref 3.7–5.3)
RBC # BLD: 4.22 M/UL (ref 4.21–5.77)
RBC # BLD: ABNORMAL 10*6/UL
SEDIMENTATION RATE, ERYTHROCYTE: 38 MM (ref 0–20)
SEG NEUTROPHILS: 68 % (ref 36–65)
SEGMENTED NEUTROPHILS ABSOLUTE COUNT: 5.95 K/UL (ref 1.5–8.1)
SODIUM BLD-SCNC: 134 MMOL/L (ref 135–144)
WBC # BLD: 8.6 K/UL (ref 3.5–11.3)
WBC # BLD: ABNORMAL 10*3/UL

## 2021-11-17 PROCEDURE — 80048 BASIC METABOLIC PNL TOTAL CA: CPT

## 2021-11-17 PROCEDURE — 6360000002 HC RX W HCPCS: Performed by: STUDENT IN AN ORGANIZED HEALTH CARE EDUCATION/TRAINING PROGRAM

## 2021-11-17 PROCEDURE — 96374 THER/PROPH/DIAG INJ IV PUSH: CPT

## 2021-11-17 PROCEDURE — 85025 COMPLETE CBC W/AUTO DIFF WBC: CPT

## 2021-11-17 PROCEDURE — 96375 TX/PRO/DX INJ NEW DRUG ADDON: CPT

## 2021-11-17 PROCEDURE — 85652 RBC SED RATE AUTOMATED: CPT

## 2021-11-17 PROCEDURE — 99283 EMERGENCY DEPT VISIT LOW MDM: CPT

## 2021-11-17 PROCEDURE — 70450 CT HEAD/BRAIN W/O DYE: CPT

## 2021-11-17 PROCEDURE — 70480 CT ORBIT/EAR/FOSSA W/O DYE: CPT

## 2021-11-17 RX ORDER — ONDANSETRON 2 MG/ML
4 INJECTION INTRAMUSCULAR; INTRAVENOUS ONCE
Status: COMPLETED | OUTPATIENT
Start: 2021-11-17 | End: 2021-11-17

## 2021-11-17 RX ORDER — METOCLOPRAMIDE HYDROCHLORIDE 5 MG/ML
10 INJECTION INTRAMUSCULAR; INTRAVENOUS ONCE
Status: COMPLETED | OUTPATIENT
Start: 2021-11-17 | End: 2021-11-17

## 2021-11-17 RX ORDER — DIPHENHYDRAMINE HYDROCHLORIDE 50 MG/ML
25 INJECTION INTRAMUSCULAR; INTRAVENOUS ONCE
Status: COMPLETED | OUTPATIENT
Start: 2021-11-17 | End: 2021-11-17

## 2021-11-17 RX ORDER — MORPHINE SULFATE 4 MG/ML
4 INJECTION, SOLUTION INTRAMUSCULAR; INTRAVENOUS ONCE
Status: COMPLETED | OUTPATIENT
Start: 2021-11-17 | End: 2021-11-17

## 2021-11-17 RX ADMIN — MORPHINE SULFATE 4 MG: 4 INJECTION INTRAVENOUS at 07:05

## 2021-11-17 RX ADMIN — DIPHENHYDRAMINE HYDROCHLORIDE 25 MG: 50 INJECTION, SOLUTION INTRAMUSCULAR; INTRAVENOUS at 04:12

## 2021-11-17 RX ADMIN — METOCLOPRAMIDE 10 MG: 5 INJECTION, SOLUTION INTRAMUSCULAR; INTRAVENOUS at 04:12

## 2021-11-17 RX ADMIN — ONDANSETRON 4 MG: 2 INJECTION INTRAMUSCULAR; INTRAVENOUS at 07:05

## 2021-11-17 ASSESSMENT — PAIN SCALES - GENERAL
PAINLEVEL_OUTOF10: 10
PAINLEVEL_OUTOF10: 8

## 2021-11-17 ASSESSMENT — ENCOUNTER SYMPTOMS
EYE DISCHARGE: 0
EYE REDNESS: 0
SHORTNESS OF BREATH: 0
COLOR CHANGE: 0
ABDOMINAL PAIN: 0

## 2021-11-18 NOTE — ED PROVIDER NOTES
[facility]  Emergency Department Encounter     Pt Name: Angel Bass  MRN: [id number]  [place] 1965  Date of evaluation: 11/17/21  PCP:  Giuseppe Becerra MD    [address] [place] [address]       Chief Complaint   Patient presents with    Eye Pain     right, procedure done 11/10    Headache    Nausea       HISTORY OFPRESENT ILLNESS  (Location/Symptom, Timing/Onset, Context/Setting, Quality, Duration, Modifying Factors,Severity.)      Angel Bass is a 55 y.o. male who presents with bilateral eye pain ongoing for the past week. States he had unknown eye procedure performed and [facility] in [place]. States he has had persistent eye pain which is continued since his procedures performed on 11/10. Severe. Bilateral.  Radiating of the head. Does have some intermittent floaters in his vision however he states his vision has not worsened since the procedure. Does endorse some nausea as well. PAST MEDICAL / SURGICAL / SOCIAL / FAMILY HISTORY      has a past medical history of CKD (chronic kidney disease), Diabetes mellitus ([address] Headache, and Hypertension. has a past surgical history that includes Rotator cuff repair; Rotator cuff repair (Right); Kidney biopsy; and pr sono guide needle biopsy.     Social History     Socioeconomic History    Marital status:      Spouse name: Not on file    Number of children: Not on file    Years of education: Not on file    Highest education level: Not on file   Occupational History    Not on file   Tobacco Use    Smoking status: Never Smoker    Smokeless tobacco: Never Used   Vaping Use    Vaping Use: Never used   Substance and Sexual Activity    Alcohol use: Yes     Comment: occas    Drug use: No    Sexual activity: Not on file   Other Topics Concern    Not on file   Social History Narrative    Not on file     Social Determinants of Health     Financial Resource Strain:     Difficulty of Paying Living Expenses: Not on file Food Insecurity:     Worried About Running Out of Food in the Last Year: Not on file    Remy of Food in the Last Year: Not on file   Transportation Needs:     Lack of Transportation (Medical): Not on file    Lack of Transportation (Non-Medical): Not on file   Physical Activity:     Days of Exercise per Week: Not on file    Minutes of Exercise per Session: Not on file   Stress:     Feeling of Stress : Not on file   Social Connections:     Frequency of Communication with Friends and Family: Not on file    Frequency of Social Gatherings with Friends and Family: Not on file    Attends Jehovah's witness Services: Not on file    Active Member of [address] [place] [address] [employer] or Organizations: Not on file    Attends Club or Organization Meetings: Not on file    Marital Status: Not on file   Intimate Partner Violence:     Fear of Current or Ex-Partner: Not on file    Emotionally Abused: Not on file    Physically Abused: Not on file    Sexually Abused: Not on file   Housing Stability:     Unable to Pay for Housing in the Last Year: Not on file    Number of [place] in the Last Year: Not on file    Unstable Housing in the Last Year: Not on file       Family History   Problem Relation Age of Onset    Heart Disease Mother     Cancer Mother        Allergies:  Dexamethasone    Home Medications:  Prior to Admission medications    Medication Sig Start Date End Date Taking?  Authorizing Provider   pantoprazole (PROTONIX) 20 MG tablet  7/21/21   Historical Provider, MD   verapamil (CALAN SR) 240 MG extended release tablet  5/6/21   Historical Provider, MD   tadalafil (CIALIS) 20 MG tablet TAKE ONE-HALF TABLET BY MOUTH ONCE DAILY AS NEEDED FOR ERECTILE DYSFUNCTION 2/25/21   Historical Provider, MD   furosemide (LASIX) 40 MG tablet  5/3/21   Historical Provider, MD   SUMAtriptan (IMITREX) 100 MG tablet  7/21/21   Historical Provider, MD   butalbital-acetaminophen-caffeine (FIORICET, ESGIC) -40 MG per tablet Take 1 tablet by mouth every 4 hours as needed for Headaches 3/9/21   Ricardo Neal MD   acetaminophen (TYLENOL) 325 MG tablet Take 2 tablets by mouth every 6 hours as needed for Pain 3/4/20   Branden Weber MD   lisinopril (PRINIVIL) 20 MG tablet Take 1 tablet by mouth daily 3/4/20   Branden Weber MD   topiramate (TOPAMAX) 50 MG tablet Take 50 mg by mouth 2 times daily    Historical Provider, MD   vitamin D (CHOLECALCIFEROL) 25 MCG (1000 UT) TABS tablet Take 1,000 Units by mouth daily    Historical Provider, MD   psyllium (KONSYL) 28.3 % PACK Take 1 packet by mouth daily as needed for Constipation    Historical Provider, MD   rosuvastatin (CRESTOR) 10 MG tablet Take 10 mg by mouth daily    Historical Provider, MD   ondansetron (ZOFRAN ODT) 4 MG disintegrating tablet Take 1 tablet by mouth every 8 hours as needed for Nausea 3/16/19   Viktor Peck MD   glimepiride (AMARYL) 4 MG tablet Take 8 mg by mouth every morning (before breakfast) Takes 2 tabs (=8mg) once daily    Historical Provider, MD   insulin glargine (LANTUS) 100 UNIT/ML injection vial Inject 25 Units into the skin every morning     Historical Provider, MD       REVIEW OF SYSTEMS    (2-9 systems for level 4, 10 or more for level 5)      Review of Systems   Constitutional: Negative for chills and fever. HENT: Positive for ear pain. Eyes: Negative for discharge and redness. Respiratory: Negative for shortness of breath. Cardiovascular: Negative for chest pain. Gastrointestinal: Negative for abdominal pain. Genitourinary: Negative for dysuria. Musculoskeletal: Negative for arthralgias. Skin: Negative for color change and rash. Neurological: Negative for headaches. Psychiatric/Behavioral: Negative for agitation and confusion. PHYSICAL EXAM   (up to 7 for level 4, 8 or more for level 5)     INITIAL VITALS:    height is 5' 9\" (1.753 m) and weight is 239 lb (108.4 kg). His oral temperature is 97.7 °F (36.5 °C).  His blood pressure is 208/116 (abnormal) and his pulse is 73. His respiration is 18 and oxygen saturation is 100%. Physical Exam  Vitals and nursing note reviewed. Constitutional:       Appearance: He is well-developed. HENT:      Head: Normocephalic and atraumatic. Nose: Nose normal.      Mouth/Throat:      Mouth: Mucous membranes are moist.   Eyes:      General: No scleral icterus. Conjunctiva/sclera: Conjunctivae normal.      Pupils: Pupils are equal, round, and reactive to light. Cardiovascular:      Rate and Rhythm: Normal rate and regular rhythm. Heart sounds: Normal heart sounds. No murmur heard. No friction rub. No gallop. Pulmonary:      Effort: Pulmonary effort is normal. No respiratory distress. Breath sounds: Normal breath sounds. No wheezing or rales. Musculoskeletal:         General: Normal range of motion. Skin:     General: Skin is warm and dry. Findings: No erythema or rash. Neurological:      Mental Status: He is alert and oriented to person, place, and time. Psychiatric:         Behavior: Behavior normal.         DIFFERENTIAL  DIAGNOSIS     PLAN (LABS / IMAGING / EKG):  Orders Placed This Encounter   Procedures    CT HEAD WO CONTRAST    CT ORBITS WO CONTRAST    Basic Metabolic Panel    CBC Auto Differential    Sedimentation Rate    Insert peripheral IV       MEDICATIONS ORDERED:  Orders Placed This Encounter   Medications    metoclopramide (REGLAN) injection 10 mg    diphenhydrAMINE (BENADRYL) injection 25 mg    morphine sulfate (PF) injection 4 mg    ondansetron (ZOFRAN) injection 4 mg       DDX: Angle-closure glaucoma versus migraine versus intracranial hemorrhage versus retrobulbar hematoma versus diabetic retinopathy    Initial MDM/Plan: 55 y.o. male who presents with bilateral eye pain after recent procedure. Will check basic labs. CT imaging.   Doubt any severe or vision threatening issue as patient states he has no difficulty with vision states his vision has not worsened. Symptomatic treatment. [facility] DIAGNOSTIC RESULTS / EMERGENCY DEPARTMENT COURSE / MDM     LABS:  Labs Reviewed   BASIC METABOLIC PANEL - Abnormal; Notable for the following components:       Result Value    Glucose 195 (*)     BUN 33 (*)     CREATININE 2.45 (*)     Sodium 134 (*)     Anion Gap 8 (*)     GFR Non- 28 (*)     GFR  33 (*)     All other components within normal limits   CBC WITH AUTO DIFFERENTIAL - Abnormal; Notable for the following components:    Hemoglobin 11.8 (*)     Hematocrit 34.0 (*)     MCV 80.6 (*)     Seg Neutrophils 68 (*)     Lymphocytes 20 (*)     Immature Granulocytes 1 (*)     All other components within normal limits   SEDIMENTATION RATE - Abnormal; Notable for the following components:    Sed Rate 38 (*)     All other components within normal limits         RADIOLOGY:  CT HEAD WO CONTRAST    Result Date: 11/17/2021  EXAMINATION: CT OF THE ORBITS WITHOUT CONTRAST; CT OF THE HEAD WITHOUT CONTRAST 11/17/2021 TECHNIQUE: CT of the orbits was performed without the administration of intravenous contrast. Multiplanar reformatted images are provided for review. Dose modulation, iterative reconstruction, and/or weight based adjustment of the mA/kV was utilized to reduce the radiation dose to as low as reasonably achievable.; CT of the head was performed without the administration of intravenous contrast. Dose modulation, iterative reconstruction, and/or weight based adjustment of the mA/kV was utilized to reduce the radiation dose to as low as reasonably achievable. COMPARISON: None.  HISTORY: ORDERING SYSTEM PROVIDED HISTORY: Severe bilateral headache, recent ocular injection, chronic renal disease TECHNOLOGIST PROVIDED HISTORY: Severe bilateral headache, recent ocular injection, chronic renal disease Decision Support Exception - unselect if not a suspected or confirmed emergency medical condition->Emergency Medical Condition (MA) Reason for Exam: Severe bilateral headache and eye pain/pressure s/p ocular injection to right eye. Photosensitivity, blurred vision and spotty vision H/o HTN, DM Acuity: Acute Type of Exam: Initial FINDINGS: HEAD: BRAIN/VENTRICLES: There is no acute intracranial hemorrhage, mass effect or midline shift. No abnormal extra-axial fluid collection. The gray-white differentiation is maintained without evidence of an acute infarct. There is no evidence of hydrocephalus. SINUSES: The visualized paranasal sinuses and mastoid air cells demonstrate no acute abnormality. SOFT TISSUES/SKULL: No acute abnormality of the visualized skull or soft tissues. ORBITS: ORBITS: The globes appear intact. The extraocular muscles, optic nerve sheath complexes and lacrimal glands appear unremarkable. No retrobulbar hematoma or mass is seen. SOFT TISSUES: No appreciable soft tissue swelling is seen. BONES: No acute osseous abnormality is seen. Normal noncontrast head CT. No acute abnormality of the orbits. CT ORBITS WO CONTRAST    Result Date: 11/17/2021  EXAMINATION: CT OF THE ORBITS WITHOUT CONTRAST; CT OF THE HEAD WITHOUT CONTRAST 11/17/2021 TECHNIQUE: CT of the orbits was performed without the administration of intravenous contrast. Multiplanar reformatted images are provided for review. Dose modulation, iterative reconstruction, and/or weight based adjustment of the mA/kV was utilized to reduce the radiation dose to as low as reasonably achievable.; CT of the head was performed without the administration of intravenous contrast. Dose modulation, iterative reconstruction, and/or weight based adjustment of the mA/kV was utilized to reduce the radiation dose to as low as reasonably achievable. COMPARISON: None.  HISTORY: ORDERING SYSTEM PROVIDED HISTORY: Severe bilateral headache, recent ocular injection, chronic renal disease TECHNOLOGIST PROVIDED HISTORY: Severe bilateral headache, recent ocular injection, chronic renal disease Decision Support Exception - unselect if not a suspected or confirmed emergency medical condition->Emergency Medical Condition (MA) Reason for Exam: Severe bilateral headache and eye pain/pressure s/p ocular injection to right eye. Photosensitivity, blurred vision and spotty vision H/o HTN, DM Acuity: Acute Type of Exam: Initial FINDINGS: HEAD: BRAIN/VENTRICLES: There is no acute intracranial hemorrhage, mass effect or midline shift. No abnormal extra-axial fluid collection. The gray-white differentiation is maintained without evidence of an acute infarct. There is no evidence of hydrocephalus. SINUSES: The visualized paranasal sinuses and mastoid air cells demonstrate no acute abnormality. SOFT TISSUES/SKULL: No acute abnormality of the visualized skull or soft tissues. ORBITS: ORBITS: The globes appear intact. The extraocular muscles, optic nerve sheath complexes and lacrimal glands appear unremarkable. No retrobulbar hematoma or mass is seen. SOFT TISSUES: No appreciable soft tissue swelling is seen. BONES: No acute osseous abnormality is seen. Normal noncontrast head CT. No acute abnormality of the orbits. EMERGENCY DEPARTMENT COURSE:  ED Course as of 11/17/21 1938 Wed Nov 17, 2021   4076 Chronic renal disease at baseline. [MS]      ED Course User Index  [MS] Nallely Olivera DO     No orbital abnormalities identified on CT imaging. Does have chronic renal disease. Headache improved with morphine. Did encourage him to follow-up closely with his ophthalmologist today as soon as possible for reevaluation. Does have mildly elevated ESR however feel temporal arteritis unlikely with bilateral vision pain with no vision loss. Voiced understanding plan. · Based on the low acuity of concerning symptoms and improvement of symptoms, patient will be discharged with follow up and prescription information listed in the Disposition section.   · Patient states they will follow-up with primary care physician and/or return to the emergency department should they experience a change or worsening of symptoms. · Patient will be discharged with resources: summary of visit, instructions, follow-up information, prescriptions if necessary. · Patient/ family instructed to read discharge paperwork. All of their questions and concerns were addressed. · Suspicion for any acute life-threatening processes is low. Patient voices understanding of plan. PROCEDURES:  None    CONSULTS:  None    CRITICAL CARE:  0    FINAL IMPRESSION      1. Eye pain, bilateral    2. Diabetic retinopathy of both eyes associated with diabetes mellitus of other type, macular edema presence unspecified, unspecified retinopathy severity ([address]    3.  Nonintractable headache, unspecified chronicity pattern, unspecified headache type          DISPOSITION / PLAN     DISPOSITION Decision To Discharge 11/17/2021 05:59:46 AM    Discharge    [facility]  As discussed please follow-up with your ophthalmologist today  Go to         DISCHARGE MEDICATIONS:  Discharge Medication List as of 11/17/2021  6:02 AM          Mariam Farias DO  EmergencyMedicine Attending    (Please note that portions of this note were completed with a voice recognition program.  Efforts were made to edit the dictations but occasionally words are mis-transcribed.)  Unlikely that temporal arteritis     Mariam Farias DO  11/17/21 1938

## 2022-10-20 NOTE — ED PROVIDER NOTES
EMERGENCY DEPARTMENT ENCOUNTER    Pt Name: Jyotsna Tidwell  MRN: [id number]  [place] 1965  Date of evaluation: 7/21/20  CHIEF COMPLAINT       Chief Complaint   Patient presents with    Knee Pain     HISTORY OF PRESENT ILLNESS   This is a 71-year-old male that presents with complaints of atraumatic pain in the right knee. Patient states that he woke up this morning with significant swelling to his right knee and some pain and discomfort. Patient describes his symptoms as severe, aggravated by walking without any alleviating factors. She denies any fevers or chills, he has no chest pain or shortness of breath. He describes his symptoms as severe. REVIEW OF SYSTEMS     Review of Systems   Constitutional: Negative for chills and fever. HENT: Negative for rhinorrhea and sore throat. Eyes: Negative for discharge, redness and visual disturbance. Respiratory: Negative for cough and shortness of breath. Cardiovascular: Negative for chest pain, palpitations and leg swelling. Gastrointestinal: Negative for diarrhea, nausea and vomiting. Musculoskeletal: Positive for joint swelling. Negative for arthralgias, myalgias and neck pain. Skin: Negative for color change and rash. Neurological: Negative for seizures, weakness and headaches. Psychiatric/Behavioral: Negative for hallucinations, self-injury and suicidal ideas.      PASTMEDICAL HISTORY     Past Medical History:   Diagnosis Date    CKD (chronic kidney disease)     Diabetes mellitus ([facility]     Headache     Hypertension      Past Problem List  Patient Active Problem List   Diagnosis Code    Severe frontal headaches R51    Blurring of vision H53.8    Intractable migraine G43.919    Type 2 diabetes mellitus with stage 3 chronic kidney disease, with long-term current use of insulin ([facility]) E11.22, N18.3, Z79.4    Headache R51    Acute kidney injury superimposed on chronic kidney disease ([facility] N17.9, N18.9    Stage 3 chronic kidney father is alive. SOCIAL HISTORY       Social History     Tobacco Use    Smoking status: Never Smoker    Smokeless tobacco: Never Used   Substance Use Topics    Alcohol use: Yes     Comment: occas    Drug use: No     PHYSICAL EXAM     INITIAL VITALS: BP (!) 165/94   Pulse 87   Temp 98.3 °F (36.8 °C) (Oral)   Resp 16   Ht 5' 9\" (1.753 m)   Wt 234 lb (106.1 kg)   SpO2 97%   BMI 34.56 kg/m²    Physical Exam  Constitutional:       Appearance: Normal appearance. HENT:      Head: Normocephalic and atraumatic. Eyes:      Extraocular Movements: Extraocular movements intact. Pupils: Pupils are equal, round, and reactive to light. Cardiovascular:      Rate and Rhythm: Normal rate and regular rhythm. Pulmonary:      Effort: Pulmonary effort is normal.      Breath sounds: Normal breath sounds. Abdominal:      General: Abdomen is flat. Palpations: Abdomen is soft. Tenderness: There is no abdominal tenderness. Musculoskeletal:      Right knee: He exhibits decreased range of motion, swelling and effusion. He exhibits no erythema. Neurological:      Mental Status: He is alert. MEDICAL DECISION MAKIN-year-old male presents with complaints of right knee pain, plan is x-ray, CBC sed rate and uric acid. No redness to suggest a septic arthritis       CRITICAL CARE:       PROCEDURES:    Procedures    DIAGNOSTIC RESULTS   EKG:All EKG's are interpreted by the Emergency Department Physician who either signs or Co-signs this chart in the absence of a cardiologist.        RADIOLOGY:All plain film, CT, MRI, and formal ultrasound images (except ED bedside ultrasound) are read by the radiologist, see reports below, unless otherwisenoted in MDM or here. XR KNEE RIGHT (3 VIEWS)   Final Result   No acute abnormality identified of the right knee. LABS: All lab results were reviewed by myself, and all abnormals are listed below.   Labs Reviewed   URIC ACID - Abnormal; Notable for the following components:       Result Value    Uric Acid 8.5 (*)     All other components within normal limits   SEDIMENTATION RATE - Abnormal; Notable for the following components:    Sed Rate 35 (*)     All other components within normal limits   CBC WITH AUTO DIFFERENTIAL - Abnormal; Notable for the following components:    Hemoglobin 12.2 (*)     Hematocrit 35.9 (*)     MCV 81.6 (*)     Seg Neutrophils 74 (*)     Lymphocytes 16 (*)     Immature Granulocytes 1 (*)     All other components within normal limits       EMERGENCY DEPARTMENTCOURSE:         Vitals:    Vitals:    07/21/20 0425   BP: (!) 165/94   Pulse: 87   Resp: 16   Temp: 98.3 °F (36.8 °C)   TempSrc: Oral   SpO2: 97%   Weight: 234 lb (106.1 kg)   Height: 5' 9\" (1.753 m)       The patient was given the following medications while in the emergency department:  Orders Placed This Encounter   Medications    DISCONTD: ketorolac (TORADOL) injection 30 mg    predniSONE (DELTASONE) tablet 60 mg    predniSONE (DELTASONE) 50 MG tablet     Sig: Take 1 tablet by mouth daily for 5 days     Dispense:  5 tablet     Refill:  0    HYDROcodone-acetaminophen (NORCO) 5-325 MG per tablet     Sig: Take 1 tablet by mouth every 6 hours as needed for Pain for up to 3 days.      Dispense:  10 tablet     Refill:  0    ketorolac (TORADOL) injection 30 mg     CONSULTS:  None    FINAL IMPRESSION      1. Knee effusion, right          DISPOSITION/PLAN   DISPOSITION Decision To Discharge 07/21/2020 05:44:56 AM      PATIENT REFERRED TO:  Jeffrey Motta MD  [address] [place] [address]  [phone]    Schedule an appointment as soon as possible for a visit in 2 days      DISCHARGE MEDICATIONS:  Discharge Medication List as of 7/21/2020  5:47 AM      START taking these medications    Details   predniSONE (DELTASONE) 50 MG tablet Take 1 tablet by mouth daily for 5 days, Disp-5 tablet,R-0Print      HYDROcodone-acetaminophen (NORCO) 5-325 MG per tablet Take 1 tablet by mouth every 6 hours as needed for Pain for up to 3 days. , Disp-10 tablet,R-0Print           Rosaline Runner, MD  Attending Emergency Physician                    Rosaline Runner, MD  07/22/20 0001 No

## 2023-05-18 ENCOUNTER — APPOINTMENT (OUTPATIENT)
Dept: CT IMAGING | Age: 58
End: 2023-05-18
Payer: OTHER GOVERNMENT

## 2023-05-18 ENCOUNTER — HOSPITAL ENCOUNTER (EMERGENCY)
Age: 58
Discharge: HOME | End: 2023-05-18
Attending: EMERGENCY MEDICINE
Payer: OTHER GOVERNMENT

## 2023-05-18 ENCOUNTER — APPOINTMENT (OUTPATIENT)
Dept: GENERAL RADIOLOGY | Age: 58
End: 2023-05-18
Payer: OTHER GOVERNMENT

## 2023-05-18 VITALS
BODY MASS INDEX: 32.98 KG/M2 | TEMPERATURE: 97.5 F | HEIGHT: 69 IN | RESPIRATION RATE: 16 BRPM | SYSTOLIC BLOOD PRESSURE: 173 MMHG | HEART RATE: 84 BPM | OXYGEN SATURATION: 100 % | WEIGHT: 222.66 LBS | DIASTOLIC BLOOD PRESSURE: 82 MMHG

## 2023-05-18 DIAGNOSIS — S20.219A CONTUSION OF CHEST WALL, UNSPECIFIED LATERALITY, INITIAL ENCOUNTER: ICD-10-CM

## 2023-05-18 DIAGNOSIS — S43.402A SPRAIN OF LEFT SHOULDER, UNSPECIFIED SHOULDER SPRAIN TYPE, INITIAL ENCOUNTER: ICD-10-CM
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
## 2023-05-18 DIAGNOSIS — S16.1XXA ACUTE STRAIN OF NECK MUSCLE, INITIAL ENCOUNTER: ICD-10-CM

## 2023-05-18 LAB
ALBUMIN SERPL-MCNC: 3.8 G/DL (ref 3.5–5.2)
ALP SERPL-CCNC: 70 U/L (ref 40–129)
ALT SERPL-CCNC: 5 U/L (ref 5–41)
ANION GAP SERPL CALCULATED.3IONS-SCNC: 9 MMOL/L (ref 9–17)
AST SERPL-CCNC: 14 U/L
BASOPHILS # BLD: 0.06 K/UL (ref 0–0.2)
BASOPHILS NFR BLD: 2 % (ref 0–2)
BILIRUB DIRECT SERPL-MCNC: <0.1 MG/DL
BILIRUB INDIRECT SERPL-MCNC: ABNORMAL MG/DL (ref 0–1)
BILIRUB SERPL-MCNC: 0.2 MG/DL (ref 0.3–1.2)
BUN SERPL-MCNC: 7 MG/DL (ref 6–20)
BUN/CREAT SERPL: 3 (ref 9–20)
CALCIUM SERPL-MCNC: 8.7 MG/DL (ref 8.6–10.4)
CHLORIDE SERPL-SCNC: 98 MMOL/L (ref 98–107)
CO2 SERPL-SCNC: 29 MMOL/L (ref 20–31)
CREAT SERPL-MCNC: 2.79 MG/DL (ref 0.7–1.2)
EOSINOPHIL # BLD: 0.12 K/UL (ref 0–0.44)
EOSINOPHILS RELATIVE PERCENT: 3 % (ref 1–4)
ERYTHROCYTE [DISTWIDTH] IN BLOOD BY AUTOMATED COUNT: 14.6 % (ref 11.8–14.4)
GFR SERPL CREATININE-BSD FRML MDRD: 26 ML/MIN/1.73M2
GLUCOSE SERPL-MCNC: 148 MG/DL (ref 70–99)
HCT VFR BLD AUTO: 28.2 % (ref 40.7–50.3)
HGB BLD-MCNC: 9 G/DL (ref 13–17)
IMM GRANULOCYTES # BLD AUTO: 0.02 K/UL (ref 0–0.3)
IMM GRANULOCYTES NFR BLD: 1 %
LYMPHOCYTES # BLD: 25 % (ref 24–43)
LYMPHOCYTES NFR BLD: 0.97 K/UL (ref 1.1–3.7)
MCH RBC QN AUTO: 29.7 PG (ref 25.2–33.5)
MCHC RBC AUTO-ENTMCNC: 31.9 G/DL (ref 28.4–34.8)
MCV RBC AUTO: 93.1 FL (ref 82.6–102.9)
MONOCYTES NFR BLD: 0.38 K/UL (ref 0.1–1.2)
MONOCYTES NFR BLD: 10 % (ref 3–12)
NEUTROPHILS NFR BLD: 59 % (ref 36–65)
NEUTS SEG NFR BLD: 2.4 K/UL (ref 1.5–8.1)
NRBC AUTOMATED: 0 PER 100 WBC
PLATELET # BLD AUTO: 205 K/UL (ref 138–453)
PMV BLD AUTO: 8.9 FL (ref 8.1–13.5)
POTASSIUM SERPL-SCNC: 3.6 MMOL/L (ref 3.7–5.3)
PROT SERPL-MCNC: 6.7 G/DL (ref 6.4–8.3)
RBC # BLD AUTO: 3.03 M/UL (ref 4.21–5.77)
RBC # BLD: ABNORMAL 10*6/UL
SODIUM SERPL-SCNC: 136 MMOL/L (ref 135–144)
WBC OTHER # BLD: 4 K/UL (ref 3.5–11.3)

## 2023-05-18 PROCEDURE — 85025 COMPLETE CBC W/AUTO DIFF WBC: CPT

## 2023-05-18 PROCEDURE — 6360000002 HC RX W HCPCS: Performed by: PHYSICIAN ASSISTANT

## 2023-05-18 PROCEDURE — 74176 CT ABD & PELVIS W/O CONTRAST: CPT

## 2023-05-18 PROCEDURE — 70450 CT HEAD/BRAIN W/O DYE: CPT

## 2023-05-18 PROCEDURE — 72125 CT NECK SPINE W/O DYE: CPT

## 2023-05-18 PROCEDURE — 96376 TX/PRO/DX INJ SAME DRUG ADON: CPT

## 2023-05-18 PROCEDURE — 96375 TX/PRO/DX INJ NEW DRUG ADDON: CPT

## 2023-05-18 PROCEDURE — 99284 EMERGENCY DEPT VISIT MOD MDM: CPT

## 2023-05-18 PROCEDURE — 80076 HEPATIC FUNCTION PANEL: CPT

## 2023-05-18 PROCEDURE — 93005 ELECTROCARDIOGRAM TRACING: CPT

## 2023-05-18 PROCEDURE — 73030 X-RAY EXAM OF SHOULDER: CPT

## 2023-05-18 PROCEDURE — 96374 THER/PROPH/DIAG INJ IV PUSH: CPT

## 2023-05-18 PROCEDURE — 80048 BASIC METABOLIC PNL TOTAL CA: CPT

## 2023-05-18 RX ORDER — ONDANSETRON 2 MG/ML
4 INJECTION INTRAMUSCULAR; INTRAVENOUS ONCE
Status: COMPLETED | OUTPATIENT
Start: 2023-05-18 | End: 2023-05-18

## 2023-05-18 RX ORDER — METHOCARBAMOL 750 MG/1
750 TABLET, FILM COATED ORAL 3 TIMES DAILY
Qty: 30 TABLET | Refills: 0 | Status: SHIPPED | OUTPATIENT
Start: 2023-05-18 | End: 2023-05-28

## 2023-05-18 RX ORDER — MORPHINE SULFATE 4 MG/ML
4 INJECTION, SOLUTION INTRAMUSCULAR; INTRAVENOUS ONCE
Status: COMPLETED | OUTPATIENT
Start: 2023-05-18 | End: 2023-05-18

## 2023-05-18 RX ORDER — DIPHENHYDRAMINE HYDROCHLORIDE 50 MG/ML
12.5 INJECTION INTRAMUSCULAR; INTRAVENOUS ONCE
Status: COMPLETED | OUTPATIENT
Start: 2023-05-18 | End: 2023-05-18

## 2023-05-18 RX ADMIN — DIPHENHYDRAMINE HYDROCHLORIDE 12.5 MG: 50 INJECTION, SOLUTION INTRAMUSCULAR; INTRAVENOUS at 15:51

## 2023-05-18 RX ADMIN — MORPHINE SULFATE 4 MG: 4 INJECTION, SOLUTION INTRAMUSCULAR; INTRAVENOUS at 15:51

## 2023-05-18 RX ADMIN — MORPHINE SULFATE 4 MG: 4 INJECTION, SOLUTION INTRAMUSCULAR; INTRAVENOUS at 17:26

## 2023-05-18 RX ADMIN — ONDANSETRON 4 MG: 2 INJECTION INTRAMUSCULAR; INTRAVENOUS at 15:51

## 2023-05-18 ASSESSMENT — PAIN DESCRIPTION - LOCATION: LOCATION: NECK

## 2023-05-18 ASSESSMENT — PAIN SCALES - GENERAL
PAINLEVEL_OUTOF10: 10

## 2023-05-18 ASSESSMENT — PAIN - FUNCTIONAL ASSESSMENT
PAIN_FUNCTIONAL_ASSESSMENT: 0-10
PAIN_FUNCTIONAL_ASSESSMENT: 0-10

## 2023-05-18 NOTE — ED NOTES
PA at bedside updating patient and patient family on test results and plan of care.      Zeinab Renner RN  05/18/23 1307

## 2023-05-19 LAB
EKG ATRIAL RATE: 84 BPM
EKG P AXIS: 43 DEGREES
EKG P-R INTERVAL: 266 MS
EKG Q-T INTERVAL: 394 MS
EKG QRS DURATION: 92 MS
EKG QTC CALCULATION (BAZETT): 465 MS
EKG R AXIS: -2 DEGREES
EKG T AXIS: 42 DEGREES
EKG VENTRICULAR RATE: 84 BPM

## 2023-05-20 ENCOUNTER — APPOINTMENT (OUTPATIENT)
Dept: CT IMAGING | Age: 58
End: 2023-05-20
Payer: COMMERCIAL

## 2023-05-20 ENCOUNTER — HOSPITAL ENCOUNTER (EMERGENCY)
Age: 58
Discharge: HOME | End: 2023-05-20
Attending: EMERGENCY MEDICINE
Payer: COMMERCIAL

## 2023-05-20 VITALS
HEIGHT: 69 IN | DIASTOLIC BLOOD PRESSURE: 90 MMHG | RESPIRATION RATE: 16 BRPM | TEMPERATURE: 97.7 F | BODY MASS INDEX: 32.88 KG/M2 | HEART RATE: 82 BPM | SYSTOLIC BLOOD PRESSURE: 190 MMHG | OXYGEN SATURATION: 99 % | WEIGHT: 222 LBS

## 2023-05-20 DIAGNOSIS — S09.90XS CLOSED HEAD INJURY, SEQUELA: ICD-10-CM
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
## 2023-05-20 PROCEDURE — 70450 CT HEAD/BRAIN W/O DYE: CPT

## 2023-05-20 PROCEDURE — 99284 EMERGENCY DEPT VISIT MOD MDM: CPT
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
## 2024-10-15 ENCOUNTER — HOSPITAL ENCOUNTER (EMERGENCY)
Age: 59
Discharge: HOME | DRG: 981 | End: 2024-10-15
Attending: EMERGENCY MEDICINE
Payer: OTHER GOVERNMENT

## 2024-10-15 VITALS
OXYGEN SATURATION: 99 % | SYSTOLIC BLOOD PRESSURE: 179 MMHG | HEIGHT: 69 IN | WEIGHT: 221 LBS | TEMPERATURE: 97.7 F | HEART RATE: 78 BPM | BODY MASS INDEX: 32.73 KG/M2 | DIASTOLIC BLOOD PRESSURE: 73 MMHG | RESPIRATION RATE: 20 BRPM

## 2024-10-15 DIAGNOSIS — R10.84 GENERALIZED ABDOMINAL PAIN: ICD-10-CM

## 2024-10-15 DIAGNOSIS — R11.2 NAUSEA AND VOMITING, UNSPECIFIED VOMITING TYPE: Primary | ICD-10-CM

## 2024-10-15 LAB
ALBUMIN SERPL-MCNC: 4.2 G/DL (ref 3.5–5.2)
ALP SERPL-CCNC: 109 U/L (ref 40–129)
ALT SERPL-CCNC: 10 U/L (ref 5–41)
ANION GAP SERPL CALCULATED.3IONS-SCNC: 15 MMOL/L (ref 9–17)
AST SERPL-CCNC: 16 U/L
BASOPHILS # BLD: 0 K/UL (ref 0–0.2)
BASOPHILS NFR BLD: 0 % (ref 0–2)
BILIRUB DIRECT SERPL-MCNC: 0.1 MG/DL
BILIRUB INDIRECT SERPL-MCNC: 0.4 MG/DL (ref 0–1)
BILIRUB SERPL-MCNC: 0.5 MG/DL (ref 0.3–1.2)
BUN SERPL-MCNC: 49 MG/DL (ref 6–20)
BUN/CREAT SERPL: 5 (ref 9–20)
CALCIUM SERPL-MCNC: 8.9 MG/DL (ref 8.6–10.4)
CHLORIDE SERPL-SCNC: 98 MMOL/L (ref 98–107)
CO2 SERPL-SCNC: 22 MMOL/L (ref 20–31)
CREAT SERPL-MCNC: 9.5 MG/DL (ref 0.7–1.2)
EOSINOPHIL # BLD: 0 K/UL (ref 0–0.44)
EOSINOPHILS RELATIVE PERCENT: 0 % (ref 1–4)
ERYTHROCYTE [DISTWIDTH] IN BLOOD BY AUTOMATED COUNT: 13.6 % (ref 11.8–14.4)
GFR, ESTIMATED: 6 ML/MIN/1.73M2
GLUCOSE SERPL-MCNC: 157 MG/DL (ref 70–99)
HCT VFR BLD AUTO: 33.8 % (ref 40.7–50.3)
HGB BLD-MCNC: 11.7 G/DL (ref 13–17)
IMM GRANULOCYTES # BLD AUTO: 0.09 K/UL (ref 0–0.3)
IMM GRANULOCYTES NFR BLD: 1 %
LIPASE SERPL-CCNC: 32 U/L (ref 13–60)
LYMPHOCYTES NFR BLD: 0.52 K/UL (ref 1.1–3.7)
LYMPHOCYTES RELATIVE PERCENT: 6 % (ref 24–43)
MCH RBC QN AUTO: 31.2 PG (ref 25.2–33.5)
MCHC RBC AUTO-ENTMCNC: 34.6 G/DL (ref 28.4–34.8)
MCV RBC AUTO: 90.1 FL (ref 82.6–102.9)
MONOCYTES NFR BLD: 0.34 K/UL (ref 0.1–1.2)
MONOCYTES NFR BLD: 4 % (ref 3–12)
NEUTROPHILS NFR BLD: 89 % (ref 36–65)
NEUTS SEG NFR BLD: 7.65 K/UL (ref 1.5–8.1)
NRBC BLD-RTO: 0 PER 100 WBC
PLATELET # BLD AUTO: 183 K/UL (ref 138–453)
PMV BLD AUTO: 9.2 FL (ref 8.1–13.5)
POTASSIUM SERPL-SCNC: 4.9 MMOL/L (ref 3.7–5.3)
PROT SERPL-MCNC: 7.1 G/DL (ref 6.4–8.3)
RBC # BLD AUTO: 3.75 M/UL (ref 4.21–5.77)
SODIUM SERPL-SCNC: 135 MMOL/L (ref 135–144)
TROPONIN I SERPL HS-MCNC: 55 NG/L (ref 0–22)
WBC OTHER # BLD: 8.6 K/UL (ref 3.5–11.3)

## 2024-10-15 PROCEDURE — 83690 ASSAY OF LIPASE: CPT

## 2024-10-15 PROCEDURE — 80048 BASIC METABOLIC PNL TOTAL CA: CPT

## 2024-10-15 PROCEDURE — 96374 THER/PROPH/DIAG INJ IV PUSH: CPT

## 2024-10-15 PROCEDURE — 93005 ELECTROCARDIOGRAM TRACING: CPT | Performed by: EMERGENCY MEDICINE

## 2024-10-15 PROCEDURE — 85025 COMPLETE CBC W/AUTO DIFF WBC: CPT

## 2024-10-15 PROCEDURE — 99284 EMERGENCY DEPT VISIT MOD MDM: CPT

## 2024-10-15 PROCEDURE — 6360000002 HC RX W HCPCS: Performed by: EMERGENCY MEDICINE

## 2024-10-15 PROCEDURE — 80076 HEPATIC FUNCTION PANEL: CPT

## 2024-10-15 PROCEDURE — 84484 ASSAY OF TROPONIN QUANT: CPT

## 2024-10-15 RX ORDER — ONDANSETRON 2 MG/ML
4 INJECTION INTRAMUSCULAR; INTRAVENOUS ONCE
Status: COMPLETED | OUTPATIENT
Start: 2024-10-15 | End: 2024-10-15

## 2024-10-15 RX ORDER — METOCLOPRAMIDE 10 MG/1
10 TABLET ORAL 4 TIMES DAILY
Qty: 20 TABLET | Refills: 0 | Status: ON HOLD | OUTPATIENT
Start: 2024-10-15 | End: 2024-10-22 | Stop reason: HOSPADM

## 2024-10-15 RX ORDER — ONDANSETRON 4 MG/1
4 TABLET, ORALLY DISINTEGRATING ORAL 3 TIMES DAILY PRN
Qty: 21 TABLET | Refills: 0 | Status: ON HOLD | OUTPATIENT
Start: 2024-10-15 | End: 2024-10-22 | Stop reason: HOSPADM

## 2024-10-15 RX ADMIN — ONDANSETRON 4 MG: 2 INJECTION, SOLUTION INTRAMUSCULAR; INTRAVENOUS at 11:32

## 2024-10-15 ASSESSMENT — PAIN DESCRIPTION - LOCATION: LOCATION: ABDOMEN

## 2024-10-15 ASSESSMENT — PAIN DESCRIPTION - DESCRIPTORS: DESCRIPTORS: ACHING

## 2024-10-15 ASSESSMENT — PAIN DESCRIPTION - FREQUENCY: FREQUENCY: CONTINUOUS

## 2024-10-15 ASSESSMENT — PAIN - FUNCTIONAL ASSESSMENT: PAIN_FUNCTIONAL_ASSESSMENT: 0-10

## 2024-10-15 ASSESSMENT — PAIN SCALES - GENERAL: PAINLEVEL_OUTOF10: 8

## 2024-10-15 NOTE — ED PROVIDER NOTES
EMERGENCY DEPARTMENT ENCOUNTER    Pt Name: Hayes Brock  MRN: [id number]  Birthdate 1965  Date of evaluation: 10/15/24  CHIEF COMPLAINT       Chief Complaint   Patient presents with    Abdominal Pain     Onset last Sun, suppose to have dialysis today    Nausea & Vomiting     HISTORY OF PRESENT ILLNESS   This is a 59-year-old male that presents emergency department with complaints of nausea vomiting and hypertension.  Patient has a history of chronic kidney disease and hemodialysis, the patient states that he missed his hemodialysis treatment today.  Patient states that he has been seen in the hospital multiple times recently for complaints of similar.  The patient states that he was recently admitted to the hospital.  He denies any chest pain, has no shortness of breath.  Denies any diarrhea.           REVIEW OF SYSTEMS     Review of Systems  PASTMEDICAL HISTORY     Past Medical History:   Diagnosis Date    CKD (chronic kidney disease)     Diabetes mellitus (HCC)     Headache     Hypertension      Past Problem List  Patient Active Problem List   Diagnosis Code    Severe frontal headaches R51.9    Blurring of vision H53.8    Intractable migraine G43.919    Type 2 diabetes mellitus with stage 3 chronic kidney disease, with long-term current use of insulin (HCC) E11.22, N18.30, Z79.4    Headache R51.9    Acute kidney injury superimposed on chronic kidney disease (HCC) N17.9, N18.9    Stage 3 chronic kidney disease (HCC) N18.30    Atypical chest pain R07.89    Essential hypertension I10    Class 2 severe obesity due to excess calories with serious comorbidity and body mass index (BMI) of 35.0 to 35.9 in adult E66.812, E66.01, Z68.35     SURGICAL HISTORY       Past Surgical History:   Procedure Laterality Date    CHG US GUIDANCE NEEDLE PLACEMENT IMG S&I      on thyroid 11/19    KIDNEY BIOPSY      2/2020    ROTATOR CUFF REPAIR      ROTATOR CUFF REPAIR Right      CURRENT MEDICATIONS       Previous Medications     myself, and all abnormals are listed below.  Labs Reviewed   BASIC METABOLIC PANEL - Abnormal; Notable for the following components:       Result Value    Glucose 157 (*)     BUN 49 (*)     Creatinine 9.5 (*)     Est, Glom Filt Rate 6 (*)     BUN/Creatinine Ratio 5 (*)     All other components within normal limits   CBC WITH AUTO DIFFERENTIAL - Abnormal; Notable for the following components:    RBC 3.75 (*)     Hemoglobin 11.7 (*)     Hematocrit 33.8 (*)     Neutrophils % 89 (*)     Lymphocytes % 6 (*)     Eosinophils % 0 (*)     Immature Granulocytes % 1 (*)     Lymphocytes Absolute 0.52 (*)     All other components within normal limits   TROPONIN - Abnormal; Notable for the following components:    Troponin, High Sensitivity 55 (*)     All other components within normal limits   HEPATIC FUNCTION PANEL   LIPASE   TROPONIN       Vitals Reviewed:    Vitals:    10/15/24 1129 10/15/24 1144 10/15/24 1243 10/15/24 1257   BP: (!) 193/86 (!) 172/75 (!) 194/87 (!) 179/73   Pulse:       Resp:       Temp:       TempSrc:       SpO2: 99% 98% 98% 99%   Weight:       Height:         MEDICATIONS GIVEN TO PATIENT THIS ENCOUNTER:  Orders Placed This Encounter   Medications    ondansetron (ZOFRAN) injection 4 mg    ondansetron (ZOFRAN-ODT) 4 MG disintegrating tablet     Sig: Take 1 tablet by mouth 3 times daily as needed for Nausea or Vomiting     Dispense:  21 tablet     Refill:  0    metoclopramide (REGLAN) 10 MG tablet     Sig: Take 1 tablet by mouth 4 times daily WARNING:  May cause drowsiness.  May impair ability to operate vehicles or machinery.  Do not use in combination with alcohol.     Dispense:  20 tablet     Refill:  0     DISCHARGE PRESCRIPTIONS:  New Prescriptions    METOCLOPRAMIDE (REGLAN) 10 MG TABLET    Take 1 tablet by mouth 4 times daily WARNING:  May cause drowsiness.  May impair ability to operate vehicles or machinery.  Do not use in combination with alcohol.    ONDANSETRON (ZOFRAN-ODT) 4 MG DISINTEGRATING

## 2024-10-16 ENCOUNTER — APPOINTMENT (OUTPATIENT)
Dept: CT IMAGING | Age: 59
DRG: 981 | End: 2024-10-16
Payer: OTHER GOVERNMENT

## 2024-10-16 ENCOUNTER — HOSPITAL ENCOUNTER (INPATIENT)
Age: 59
LOS: 6 days | Discharge: HOME | DRG: 981 | End: 2024-10-22
Payer: OTHER GOVERNMENT

## 2024-10-16 DIAGNOSIS — I82.90 CLOT: ICD-10-CM

## 2024-10-16 DIAGNOSIS — K31.84 GASTROPARESIS: ICD-10-CM

## 2024-10-16 DIAGNOSIS — T82.9XXA COMPLICATION OF AV DIALYSIS FISTULA, INITIAL ENCOUNTER: ICD-10-CM

## 2024-10-16 DIAGNOSIS — R11.2 NAUSEA AND VOMITING, UNSPECIFIED VOMITING TYPE: Primary | ICD-10-CM

## 2024-10-16 PROBLEM — Z99.2 ESRD NEEDING DIALYSIS (HCC): Status: ACTIVE | Noted: 2024-10-16

## 2024-10-16 PROBLEM — N18.6 ESRD NEEDING DIALYSIS (HCC): Status: ACTIVE | Noted: 2024-10-16

## 2024-10-16 LAB
ALBUMIN SERPL-MCNC: 4.3 G/DL (ref 3.5–5.2)
ALP SERPL-CCNC: 108 U/L (ref 40–129)
ALT SERPL-CCNC: 11 U/L (ref 5–41)
ANION GAP SERPL CALCULATED.3IONS-SCNC: 20 MMOL/L (ref 9–17)
AST SERPL-CCNC: 17 U/L
BASOPHILS # BLD: 0.04 K/UL (ref 0–0.2)
BASOPHILS NFR BLD: 0 % (ref 0–2)
BILIRUB DIRECT SERPL-MCNC: 0.1 MG/DL
BILIRUB INDIRECT SERPL-MCNC: 0.5 MG/DL (ref 0–1)
BILIRUB SERPL-MCNC: 0.6 MG/DL (ref 0.3–1.2)
BUN SERPL-MCNC: 62 MG/DL (ref 6–20)
BUN/CREAT SERPL: 6 (ref 9–20)
CALCIUM SERPL-MCNC: 9.1 MG/DL (ref 8.6–10.4)
CHLORIDE SERPL-SCNC: 95 MMOL/L (ref 98–107)
CO2 SERPL-SCNC: 20 MMOL/L (ref 20–31)
CREAT SERPL-MCNC: 11 MG/DL (ref 0.7–1.2)
EOSINOPHIL # BLD: 0.09 K/UL (ref 0–0.44)
EOSINOPHILS RELATIVE PERCENT: 1 % (ref 1–4)
ERYTHROCYTE [DISTWIDTH] IN BLOOD BY AUTOMATED COUNT: 13.6 % (ref 11.8–14.4)
FLUAV RNA RESP QL NAA+PROBE: NOT DETECTED
FLUBV RNA RESP QL NAA+PROBE: NOT DETECTED
GFR, ESTIMATED: 5 ML/MIN/1.73M2
GLUCOSE BLD-MCNC: 133 MG/DL (ref 75–110)
GLUCOSE BLD-MCNC: 164 MG/DL (ref 75–110)
GLUCOSE BLD-MCNC: 199 MG/DL (ref 75–110)
GLUCOSE SERPL-MCNC: 175 MG/DL (ref 70–99)
HCT VFR BLD AUTO: 35.1 % (ref 40.7–50.3)
HGB BLD-MCNC: 12.1 G/DL (ref 13–17)
IMM GRANULOCYTES # BLD AUTO: 0.07 K/UL (ref 0–0.3)
IMM GRANULOCYTES NFR BLD: 1 %
LIPASE SERPL-CCNC: 40 U/L (ref 13–60)
LYMPHOCYTES NFR BLD: 0.97 K/UL (ref 1.1–3.7)
LYMPHOCYTES RELATIVE PERCENT: 8 % (ref 24–43)
MCH RBC QN AUTO: 30.9 PG (ref 25.2–33.5)
MCHC RBC AUTO-ENTMCNC: 34.5 G/DL (ref 28.4–34.8)
MCV RBC AUTO: 89.5 FL (ref 82.6–102.9)
MONOCYTES NFR BLD: 0.69 K/UL (ref 0.1–1.2)
MONOCYTES NFR BLD: 6 % (ref 3–12)
NEUTROPHILS NFR BLD: 84 % (ref 36–65)
NEUTS SEG NFR BLD: 9.68 K/UL (ref 1.5–8.1)
NRBC BLD-RTO: 0 PER 100 WBC
PLATELET # BLD AUTO: 202 K/UL (ref 138–453)
PMV BLD AUTO: 8.9 FL (ref 8.1–13.5)
POTASSIUM SERPL-SCNC: 4.2 MMOL/L (ref 3.7–5.3)
PROT SERPL-MCNC: 7.4 G/DL (ref 6.4–8.3)
RBC # BLD AUTO: 3.92 M/UL (ref 4.21–5.77)
SARS-COV-2 RNA RESP QL NAA+PROBE: NOT DETECTED
SODIUM SERPL-SCNC: 135 MMOL/L (ref 135–144)
SOURCE: NORMAL
SPECIMEN DESCRIPTION: NORMAL
TROPONIN I SERPL HS-MCNC: 52 NG/L (ref 0–22)
WBC OTHER # BLD: 11.5 K/UL (ref 3.5–11.3)

## 2024-10-16 PROCEDURE — 84484 ASSAY OF TROPONIN QUANT: CPT

## 2024-10-16 PROCEDURE — 6360000002 HC RX W HCPCS: Performed by: INTERNAL MEDICINE

## 2024-10-16 PROCEDURE — 87636 SARSCOV2 & INF A&B AMP PRB: CPT

## 2024-10-16 PROCEDURE — 6360000002 HC RX W HCPCS: Performed by: NURSE PRACTITIONER

## 2024-10-16 PROCEDURE — 93005 ELECTROCARDIOGRAM TRACING: CPT

## 2024-10-16 PROCEDURE — 6370000000 HC RX 637 (ALT 250 FOR IP): Performed by: NURSE PRACTITIONER

## 2024-10-16 PROCEDURE — 85025 COMPLETE CBC W/AUTO DIFF WBC: CPT

## 2024-10-16 PROCEDURE — 80076 HEPATIC FUNCTION PANEL: CPT

## 2024-10-16 PROCEDURE — 83690 ASSAY OF LIPASE: CPT

## 2024-10-16 PROCEDURE — 82947 ASSAY GLUCOSE BLOOD QUANT: CPT

## 2024-10-16 PROCEDURE — 96374 THER/PROPH/DIAG INJ IV PUSH: CPT

## 2024-10-16 PROCEDURE — 5A1D70Z PERFORMANCE OF URINARY FILTRATION, INTERMITTENT, LESS THAN 6 HOURS PER DAY: ICD-10-PCS | Performed by: INTERNAL MEDICINE

## 2024-10-16 PROCEDURE — 1200000000 HC SEMI PRIVATE

## 2024-10-16 PROCEDURE — 90935 HEMODIALYSIS ONE EVALUATION: CPT

## 2024-10-16 PROCEDURE — 87040 BLOOD CULTURE FOR BACTERIA: CPT

## 2024-10-16 PROCEDURE — 6360000002 HC RX W HCPCS

## 2024-10-16 PROCEDURE — 80048 BASIC METABOLIC PNL TOTAL CA: CPT

## 2024-10-16 PROCEDURE — 2580000003 HC RX 258: Performed by: NURSE PRACTITIONER

## 2024-10-16 PROCEDURE — 74176 CT ABD & PELVIS W/O CONTRAST: CPT

## 2024-10-16 PROCEDURE — 36415 COLL VENOUS BLD VENIPUNCTURE: CPT

## 2024-10-16 PROCEDURE — 99285 EMERGENCY DEPT VISIT HI MDM: CPT

## 2024-10-16 RX ORDER — DIPHENHYDRAMINE HYDROCHLORIDE 50 MG/ML
25 INJECTION INTRAMUSCULAR; INTRAVENOUS EVERY 6 HOURS PRN
Status: DISCONTINUED | OUTPATIENT
Start: 2024-10-16 | End: 2024-10-18

## 2024-10-16 RX ORDER — ONDANSETRON 2 MG/ML
4 INJECTION INTRAMUSCULAR; INTRAVENOUS ONCE
Status: COMPLETED | OUTPATIENT
Start: 2024-10-16 | End: 2024-10-16

## 2024-10-16 RX ORDER — ONDANSETRON 4 MG/1
4 TABLET, FILM COATED ORAL EVERY 12 HOURS PRN
Status: ON HOLD | COMMUNITY
End: 2024-10-22 | Stop reason: HOSPADM

## 2024-10-16 RX ORDER — SODIUM CHLORIDE 0.9 % (FLUSH) 0.9 %
10 SYRINGE (ML) INJECTION PRN
Status: DISCONTINUED | OUTPATIENT
Start: 2024-10-16 | End: 2024-10-22 | Stop reason: HOSPADM

## 2024-10-16 RX ORDER — WARFARIN SODIUM 5 MG/1
5-7.5 TABLET ORAL SEE ADMIN INSTRUCTIONS
COMMUNITY

## 2024-10-16 RX ORDER — SODIUM CHLORIDE 0.9 % (FLUSH) 0.9 %
5-40 SYRINGE (ML) INJECTION EVERY 12 HOURS SCHEDULED
Status: DISCONTINUED | OUTPATIENT
Start: 2024-10-16 | End: 2024-10-22 | Stop reason: HOSPADM

## 2024-10-16 RX ORDER — FOLIC ACID 1 MG/1
1 TABLET ORAL DAILY
COMMUNITY

## 2024-10-16 RX ORDER — TOPIRAMATE 25 MG/1
50 TABLET, FILM COATED ORAL 2 TIMES DAILY
Status: DISCONTINUED | OUTPATIENT
Start: 2024-10-16 | End: 2024-10-16

## 2024-10-16 RX ORDER — MAGNESIUM OXIDE 420 MG/1
1 TABLET ORAL 2 TIMES DAILY
COMMUNITY

## 2024-10-16 RX ORDER — LISINOPRIL 20 MG/1
20 TABLET ORAL DAILY
Status: DISCONTINUED | OUTPATIENT
Start: 2024-10-16 | End: 2024-10-16

## 2024-10-16 RX ORDER — ALUMINA, MAGNESIA, AND SIMETHICONE 2400; 2400; 240 MG/30ML; MG/30ML; MG/30ML
15 SUSPENSION ORAL EVERY 6 HOURS PRN
COMMUNITY

## 2024-10-16 RX ORDER — ROSUVASTATIN CALCIUM 5 MG/1
5 TABLET, COATED ORAL DAILY
Status: DISCONTINUED | OUTPATIENT
Start: 2024-10-16 | End: 2024-10-22 | Stop reason: HOSPADM

## 2024-10-16 RX ORDER — ONDANSETRON 2 MG/ML
4 INJECTION INTRAMUSCULAR; INTRAVENOUS EVERY 6 HOURS PRN
Status: DISCONTINUED | OUTPATIENT
Start: 2024-10-16 | End: 2024-10-22 | Stop reason: HOSPADM

## 2024-10-16 RX ORDER — GLIPIZIDE 10 MG/1
10 TABLET ORAL
Status: DISCONTINUED | OUTPATIENT
Start: 2024-10-17 | End: 2024-10-22 | Stop reason: HOSPADM

## 2024-10-16 RX ORDER — NIFEDIPINE 60 MG/1
60 TABLET, EXTENDED RELEASE ORAL DAILY
COMMUNITY

## 2024-10-16 RX ORDER — SEVELAMER HYDROCHLORIDE 800 MG/1
800 TABLET, FILM COATED ORAL
COMMUNITY

## 2024-10-16 RX ORDER — VERAPAMIL HYDROCHLORIDE 240 MG/1
240 TABLET, FILM COATED, EXTENDED RELEASE ORAL NIGHTLY
Status: DISCONTINUED | OUTPATIENT
Start: 2024-10-16 | End: 2024-10-16

## 2024-10-16 RX ORDER — INSULIN GLARGINE 100 [IU]/ML
25 INJECTION, SOLUTION SUBCUTANEOUS EVERY MORNING
Status: DISCONTINUED | OUTPATIENT
Start: 2024-10-16 | End: 2024-10-22 | Stop reason: HOSPADM

## 2024-10-16 RX ORDER — ACETAMINOPHEN 325 MG/1
650 TABLET ORAL EVERY 6 HOURS PRN
Status: DISCONTINUED | OUTPATIENT
Start: 2024-10-16 | End: 2024-10-22 | Stop reason: HOSPADM

## 2024-10-16 RX ORDER — METOCLOPRAMIDE 5 MG/1
5 TABLET ORAL 4 TIMES DAILY
Status: DISCONTINUED | OUTPATIENT
Start: 2024-10-16 | End: 2024-10-22 | Stop reason: HOSPADM

## 2024-10-16 RX ORDER — ONDANSETRON 4 MG/1
4 TABLET, ORALLY DISINTEGRATING ORAL EVERY 8 HOURS PRN
Status: DISCONTINUED | OUTPATIENT
Start: 2024-10-16 | End: 2024-10-22 | Stop reason: HOSPADM

## 2024-10-16 RX ORDER — HYDRALAZINE HYDROCHLORIDE 100 MG/1
100 TABLET, FILM COATED ORAL EVERY 8 HOURS
Status: ON HOLD | COMMUNITY
End: 2024-10-22 | Stop reason: HOSPADM

## 2024-10-16 RX ORDER — HEPARIN SODIUM 5000 [USP'U]/ML
5000 INJECTION, SOLUTION INTRAVENOUS; SUBCUTANEOUS EVERY 8 HOURS SCHEDULED
Status: DISCONTINUED | OUTPATIENT
Start: 2024-10-16 | End: 2024-10-18

## 2024-10-16 RX ORDER — ACETAMINOPHEN 650 MG/1
650 SUPPOSITORY RECTAL EVERY 6 HOURS PRN
Status: DISCONTINUED | OUTPATIENT
Start: 2024-10-16 | End: 2024-10-22 | Stop reason: HOSPADM

## 2024-10-16 RX ORDER — DEXTROSE MONOHYDRATE 100 MG/ML
INJECTION, SOLUTION INTRAVENOUS CONTINUOUS PRN
Status: DISCONTINUED | OUTPATIENT
Start: 2024-10-16 | End: 2024-10-22 | Stop reason: HOSPADM

## 2024-10-16 RX ORDER — TRAZODONE HYDROCHLORIDE 50 MG/1
50 TABLET, FILM COATED ORAL NIGHTLY
COMMUNITY

## 2024-10-16 RX ORDER — MORPHINE SULFATE 2 MG/ML
2 INJECTION, SOLUTION INTRAMUSCULAR; INTRAVENOUS EVERY 4 HOURS PRN
Status: COMPLETED | OUTPATIENT
Start: 2024-10-16 | End: 2024-10-19

## 2024-10-16 RX ORDER — POLYETHYLENE GLYCOL 3350 17 G/17G
17 POWDER, FOR SOLUTION ORAL DAILY PRN
Status: DISCONTINUED | OUTPATIENT
Start: 2024-10-16 | End: 2024-10-22 | Stop reason: HOSPADM

## 2024-10-16 RX ORDER — SODIUM CHLORIDE 9 MG/ML
INJECTION, SOLUTION INTRAVENOUS PRN
Status: DISCONTINUED | OUTPATIENT
Start: 2024-10-16 | End: 2024-10-22 | Stop reason: HOSPADM

## 2024-10-16 RX ORDER — HYDRALAZINE HYDROCHLORIDE 20 MG/ML
20 INJECTION INTRAMUSCULAR; INTRAVENOUS ONCE
Status: COMPLETED | OUTPATIENT
Start: 2024-10-16 | End: 2024-10-16

## 2024-10-16 RX ORDER — FUROSEMIDE 40 MG/1
40 TABLET ORAL DAILY
Status: DISCONTINUED | OUTPATIENT
Start: 2024-10-16 | End: 2024-10-16

## 2024-10-16 RX ORDER — PANTOPRAZOLE SODIUM 40 MG/1
40 TABLET, DELAYED RELEASE ORAL
Status: DISCONTINUED | OUTPATIENT
Start: 2024-10-17 | End: 2024-10-22 | Stop reason: HOSPADM

## 2024-10-16 RX ORDER — TOPIRAMATE 100 MG/1
100 TABLET, FILM COATED ORAL 2 TIMES DAILY
Status: DISCONTINUED | OUTPATIENT
Start: 2024-10-16 | End: 2024-10-22 | Stop reason: HOSPADM

## 2024-10-16 RX ADMIN — DIPHENHYDRAMINE HYDROCHLORIDE 25 MG: 50 INJECTION INTRAMUSCULAR; INTRAVENOUS at 08:24

## 2024-10-16 RX ADMIN — DIPHENHYDRAMINE HYDROCHLORIDE 25 MG: 50 INJECTION INTRAMUSCULAR; INTRAVENOUS at 20:00

## 2024-10-16 RX ADMIN — ONDANSETRON 4 MG: 2 INJECTION, SOLUTION INTRAMUSCULAR; INTRAVENOUS at 11:01

## 2024-10-16 RX ADMIN — TOPIRAMATE 100 MG: 100 TABLET, FILM COATED ORAL at 21:17

## 2024-10-16 RX ADMIN — MORPHINE SULFATE 2 MG: 2 INJECTION, SOLUTION INTRAMUSCULAR; INTRAVENOUS at 18:40

## 2024-10-16 RX ADMIN — ONDANSETRON 4 MG: 2 INJECTION, SOLUTION INTRAMUSCULAR; INTRAVENOUS at 18:40

## 2024-10-16 RX ADMIN — MORPHINE SULFATE 2 MG: 2 INJECTION, SOLUTION INTRAMUSCULAR; INTRAVENOUS at 08:25

## 2024-10-16 RX ADMIN — SODIUM CHLORIDE, PRESERVATIVE FREE 10 ML: 5 INJECTION INTRAVENOUS at 11:02

## 2024-10-16 RX ADMIN — HEPARIN SODIUM 5000 UNITS: 5000 INJECTION INTRAVENOUS; SUBCUTANEOUS at 21:17

## 2024-10-16 RX ADMIN — METOCLOPRAMIDE 5 MG: 5 TABLET ORAL at 21:16

## 2024-10-16 RX ADMIN — SODIUM CHLORIDE, PRESERVATIVE FREE 10 ML: 5 INJECTION INTRAVENOUS at 20:00

## 2024-10-16 RX ADMIN — HYDRALAZINE HYDROCHLORIDE 20 MG: 20 INJECTION INTRAMUSCULAR; INTRAVENOUS at 11:02

## 2024-10-16 RX ADMIN — LIDOCAINE HYDROCHLORIDE: 20 SOLUTION ORAL at 08:26

## 2024-10-16 RX ADMIN — ONDANSETRON 4 MG: 2 INJECTION, SOLUTION INTRAMUSCULAR; INTRAVENOUS at 06:46

## 2024-10-16 ASSESSMENT — PAIN SCALES - GENERAL
PAINLEVEL_OUTOF10: 3
PAINLEVEL_OUTOF10: 3
PAINLEVEL_OUTOF10: 10
PAINLEVEL_OUTOF10: 4
PAINLEVEL_OUTOF10: 10
PAINLEVEL_OUTOF10: 7
PAINLEVEL_OUTOF10: 4

## 2024-10-16 ASSESSMENT — PAIN DESCRIPTION - PAIN TYPE
TYPE: ACUTE PAIN

## 2024-10-16 ASSESSMENT — PAIN DESCRIPTION - FREQUENCY
FREQUENCY: CONTINUOUS

## 2024-10-16 ASSESSMENT — PAIN DESCRIPTION - DESCRIPTORS
DESCRIPTORS: SORE
DESCRIPTORS: SORE

## 2024-10-16 ASSESSMENT — PAIN - FUNCTIONAL ASSESSMENT
PAIN_FUNCTIONAL_ASSESSMENT: PREVENTS OR INTERFERES SOME ACTIVE ACTIVITIES AND ADLS
PAIN_FUNCTIONAL_ASSESSMENT: PREVENTS OR INTERFERES SOME ACTIVE ACTIVITIES AND ADLS
PAIN_FUNCTIONAL_ASSESSMENT: 0-10

## 2024-10-16 ASSESSMENT — PAIN DESCRIPTION - LOCATION
LOCATION: ABDOMEN

## 2024-10-16 ASSESSMENT — PAIN DESCRIPTION - ORIENTATION
ORIENTATION: UPPER

## 2024-10-16 ASSESSMENT — PAIN DESCRIPTION - ONSET
ONSET: ON-GOING
ONSET: ON-GOING

## 2024-10-16 NOTE — DIALYSIS
HEMODIALYSIS PRE-TREATMENT NOTE    Patient Identifiers prior to treatment: Name, , MRN    Isolation Required: No                      Isolation Type: na       (please document if patient is being managed as a PUI/COVID-19 patient)        Hepatitis status:                           Date Drawn                             Result  Hepatitis B Surface Antigen 24     Neg                     Hepatitis B Surface Antibody 24 96        Hepatitis B Core Antibody na na          How was Hepatitis Status verified: Pts home dialysis records.     Was a copy of the labs you documented provided to facility for the patient's chart: Yes    Hemodialysis orders verified: per Dr. Morrow at 1010.    Access Within normal limits ( I.e. s/s of infection,...): Yes. No redness, edema or drainage.     Pre-Assessment completed: Yes    Pre-dialysis report received from: Hannah Patel                      Time: 7977

## 2024-10-16 NOTE — ED PROVIDER NOTES
EMERGENCY DEPARTMENT ENCOUNTER    Pt Name: Hayes Brock  MRN: [id number]  Birthdate 1965  Date of evaluation: 10/16/24      Patient sign out from Dr. Mayes.    Treatment and Disposition    Patient repeat assessment: 59-year-old male, history of ESRD returns to the ED for gastroparesis.  Patient is hemodynamically stable.  Labs showing potassium 4.2, creatinine 11.0, WC 11.5.  EKG concerning for peaked T waves, similar to EKG from yesterday.    Case discussed with Dr. Becerra.  He advises he does not cover at [facility].  Case discussed with Dr. Morrow, who agrees with plan for admission and dialysis.    Discussed case with Guero Huggins, who accepts patient for admission to hospital.    Shared Decision Making completed with patient regarding risks and benefits of admission versus discharge.  Patient decides to be admitted to the hospital.      Disposition discussion with patient/family: Patient aware and agrees with disposition plan.    MIPS:  N/A    Social determinants of health impacting treatment or disposition:  None      Code Status Discussion:  Not discussed    \"ED Course\" Notes From Epic Narrator:       CRITICAL CARE:   N/A    PROCEDURES:  Procedures      DATA FOR LAB AND RADIOLOGY TESTS ORDERED BELOW ARE REVIEWED BY THE ED CLINICIAN:    RADIOLOGY: All x-rays, CT, MRI, and formal ultrasound images (except ED bedside ultrasound) are read by the radiologist, see reports below, unless otherwise noted in MDM or here.  Reports below are reviewed by myself.  CT ABDOMEN PELVIS WO CONTRAST Additional Contrast? None   Final Result   No acute inflammatory obstructive process seen in the abdomen or pelvis.             LABS: Lab orders shown below, the results are reviewed by myself, and all abnormals are listed below.  Labs Reviewed   CBC WITH AUTO DIFFERENTIAL - Abnormal; Notable for the following components:       Result Value    WBC 11.5 (*)     RBC 3.92 (*)     Hemoglobin 12.1 (*)     Hematocrit 35.1 (*)     
Unavailable      PATIENT REFERRED TO:  No follow-up provider specified.    DISCHARGE MEDICATIONS:  New Prescriptions    No medications on file       Vini Mayes DO  Emergency Medicine Physician     (Please note that portions of thisnote were completed with a voice recognition program.  Efforts were made to edit the dictations but occasionally words are mis-transcribed.)        Vini Mayes DO  10/16/24 2028

## 2024-10-16 NOTE — H&P
Patient status inpatient in the  Med/Surge    ESRD requiring dialysis  Consult nephrology and anticipate dialysis today  Renal diet and oral fluid restriction  Continue home dosage of Lasix  Abdominal pain with a history of diabetic gastroparesis  IV Reglan, diabetic diet,   glycemic control  Continue Amaryl, Lantus 25 units twice daily  And corrective sliding scale  Morphine and Benadryl  CT without acute abnormality, physical exam normal except for pain on palpation, reevaluate after dialysis  Essential hypertension with hypertension reading without hypertensive crisis  Home regiment now  Lisinopril, verapamil  Dialysis and reevaluate    Consultations:   IP CONSULT TO INTERNAL MEDICINE  IP CONSULT TO NEPHROLOGY     Patient is admitted as inpatient status because of co-morbidities listed above, severity of signs and symptoms as outlined, requirement for current medical therapies and most importantly because of direct risk to patient if care not provided in a hospital setting.  Expected length of stay > 48 hours.    Tre Julien, ROBERT - NP  10/16/2024  8:20 AM    Copy sent to Dr. Gibson, Misael CASTRO MD

## 2024-10-16 NOTE — DIALYSIS
HEMODIALYSIS POST TREATMENT NOTE    Treatment time ordered: 3.5 hours    Actual treatment time: 90 minutes    UltraFiltration Goal: 1000 ml  UltraFiltration Removed: 389 ml      Pre Treatment weight: 100.4kg  Post Treatment weight: 99.5kg  Estimated Dry Weight: 103.5 Kg    Access used:     Central Venous Catheter:          Tunneled or Non-tunneled: na           Site: na          Access Flow: na      Internal Access:       AV Fistula or AV Graft: AVF         Site: Left forearm       Access Flow: 400       Sign and symptoms of infection: None       If YES: No  Initially blood upon start of treatment was bright red then darkened some and appeared normal. There was question of recirculation. After 1.5 hours of tx, TMP level was up. Writer able to flush with improvement and then with minimal warning TMP was at 80 and unable to run machine due to alarms. Attempted to rinse blood back and unable. It appears circuit is clotted, however unable to detect any dark blood or clotting.  Attempted to flush needles and both were clotted. Clot was pulled from connection site between arterial needle and dialysis tubing.   No thrill or bruit noted. Dr. Morrow notified. IR is not here today to schedule emergent declot. Orders received to inform patient's floor nurse to contact Dr. Willoughby to see patient regarding clotted AVF.  Patient's nurse Hannah informed of this request per Dr. Morrow. Discussed incident with patient and the need to have access looked at. Patient in stable condition. No complaints.  BP was elevated on arrival and did come down during treatment.    Medications or blood products given: None    Chronic outpatient schedule: T/T/S    Chronic outpatient unit: [facility]    Summary of response to treatment: See above    Explain if orders NOT met, was physician notified:Dr. Morrow      ACES flowsheet faxed to patient unit/ placed in patient chart: Yes    Post assessment completed: Yes    Report given to: Hannah Patel      *

## 2024-10-17 ENCOUNTER — APPOINTMENT (OUTPATIENT)
Dept: INTERVENTIONAL RADIOLOGY/VASCULAR | Age: 59
DRG: 981 | End: 2024-10-17
Payer: OTHER GOVERNMENT

## 2024-10-17 LAB
ANION GAP SERPL CALCULATED.3IONS-SCNC: 13 MMOL/L (ref 9–17)
BASOPHILS # BLD: 0.04 K/UL (ref 0–0.2)
BASOPHILS NFR BLD: 1 % (ref 0–2)
BUN SERPL-MCNC: 66 MG/DL (ref 6–20)
BUN/CREAT SERPL: 6 (ref 9–20)
CALCIUM SERPL-MCNC: 8.6 MG/DL (ref 8.6–10.4)
CHLORIDE SERPL-SCNC: 100 MMOL/L (ref 98–107)
CO2 SERPL-SCNC: 24 MMOL/L (ref 20–31)
CREAT SERPL-MCNC: 11.3 MG/DL (ref 0.7–1.2)
EKG ATRIAL RATE: 63 BPM
EKG ATRIAL RATE: 68 BPM
EKG P AXIS: 51 DEGREES
EKG P AXIS: 64 DEGREES
EKG P-R INTERVAL: 176 MS
EKG P-R INTERVAL: 186 MS
EKG Q-T INTERVAL: 430 MS
EKG Q-T INTERVAL: 440 MS
EKG QRS DURATION: 102 MS
EKG QRS DURATION: 94 MS
EKG QTC CALCULATION (BAZETT): 450 MS
EKG QTC CALCULATION (BAZETT): 457 MS
EKG R AXIS: 3 DEGREES
EKG R AXIS: 52 DEGREES
EKG T AXIS: 23 DEGREES
EKG T AXIS: 67 DEGREES
EKG VENTRICULAR RATE: 63 BPM
EKG VENTRICULAR RATE: 68 BPM
EOSINOPHIL # BLD: 0.05 K/UL (ref 0–0.44)
EOSINOPHILS RELATIVE PERCENT: 1 % (ref 1–4)
ERYTHROCYTE [DISTWIDTH] IN BLOOD BY AUTOMATED COUNT: 13.7 % (ref 11.8–14.4)
GFR, ESTIMATED: 5 ML/MIN/1.73M2
GLUCOSE BLD-MCNC: 103 MG/DL (ref 75–110)
GLUCOSE BLD-MCNC: 110 MG/DL (ref 75–110)
GLUCOSE BLD-MCNC: 221 MG/DL (ref 75–110)
GLUCOSE SERPL-MCNC: 100 MG/DL (ref 70–99)
HCT VFR BLD AUTO: 30 % (ref 40.7–50.3)
HGB BLD-MCNC: 10.1 G/DL (ref 13–17)
IMM GRANULOCYTES # BLD AUTO: 0.02 K/UL (ref 0–0.3)
IMM GRANULOCYTES NFR BLD: 0 %
INR PPP: 1.4
LYMPHOCYTES NFR BLD: 1.03 K/UL (ref 1.1–3.7)
LYMPHOCYTES RELATIVE PERCENT: 15 % (ref 24–43)
MCH RBC QN AUTO: 31.2 PG (ref 25.2–33.5)
MCHC RBC AUTO-ENTMCNC: 33.7 G/DL (ref 28.4–34.8)
MCV RBC AUTO: 92.6 FL (ref 82.6–102.9)
MONOCYTES NFR BLD: 0.5 K/UL (ref 0.1–1.2)
MONOCYTES NFR BLD: 7 % (ref 3–12)
NEUTROPHILS NFR BLD: 76 % (ref 36–65)
NEUTS SEG NFR BLD: 5.15 K/UL (ref 1.5–8.1)
NRBC BLD-RTO: 0 PER 100 WBC
PLATELET # BLD AUTO: 172 K/UL (ref 138–453)
PMV BLD AUTO: 9.1 FL (ref 8.1–13.5)
POTASSIUM SERPL-SCNC: 4.3 MMOL/L (ref 3.7–5.3)
PROTHROMBIN TIME: 16.7 SEC (ref 11.5–14.2)
RBC # BLD AUTO: 3.24 M/UL (ref 4.21–5.77)
SODIUM SERPL-SCNC: 137 MMOL/L (ref 135–144)
WBC OTHER # BLD: 6.8 K/UL (ref 3.5–11.3)

## 2024-10-17 PROCEDURE — 6360000002 HC RX W HCPCS: Performed by: NURSE PRACTITIONER

## 2024-10-17 PROCEDURE — 82947 ASSAY GLUCOSE BLOOD QUANT: CPT

## 2024-10-17 PROCEDURE — 02HV33Z INSERTION OF INFUSION DEVICE INTO SUPERIOR VENA CAVA, PERCUTANEOUS APPROACH: ICD-10-PCS | Performed by: RADIOLOGY

## 2024-10-17 PROCEDURE — 76937 US GUIDE VASCULAR ACCESS: CPT

## 2024-10-17 PROCEDURE — 90935 HEMODIALYSIS ONE EVALUATION: CPT

## 2024-10-17 PROCEDURE — 1200000000 HC SEMI PRIVATE

## 2024-10-17 PROCEDURE — B548ZZA ULTRASONOGRAPHY OF SUPERIOR VENA CAVA, GUIDANCE: ICD-10-PCS | Performed by: RADIOLOGY

## 2024-10-17 PROCEDURE — 36415 COLL VENOUS BLD VENIPUNCTURE: CPT

## 2024-10-17 PROCEDURE — 85610 PROTHROMBIN TIME: CPT

## 2024-10-17 PROCEDURE — 80048 BASIC METABOLIC PNL TOTAL CA: CPT

## 2024-10-17 PROCEDURE — 6370000000 HC RX 637 (ALT 250 FOR IP): Performed by: NURSE PRACTITIONER

## 2024-10-17 PROCEDURE — 97165 OT EVAL LOW COMPLEX 30 MIN: CPT

## 2024-10-17 PROCEDURE — 36556 INSERT NON-TUNNEL CV CATH: CPT

## 2024-10-17 PROCEDURE — 2580000003 HC RX 258: Performed by: NURSE PRACTITIONER

## 2024-10-17 PROCEDURE — 97535 SELF CARE MNGMENT TRAINING: CPT

## 2024-10-17 PROCEDURE — 77001 FLUOROGUIDE FOR VEIN DEVICE: CPT

## 2024-10-17 PROCEDURE — 85025 COMPLETE CBC W/AUTO DIFF WBC: CPT

## 2024-10-17 PROCEDURE — 6360000002 HC RX W HCPCS: Performed by: RADIOLOGY

## 2024-10-17 PROCEDURE — C1769 GUIDE WIRE: HCPCS

## 2024-10-17 RX ORDER — HEPARIN SODIUM 1000 [USP'U]/ML
INJECTION, SOLUTION INTRAVENOUS; SUBCUTANEOUS PRN
Status: COMPLETED | OUTPATIENT
Start: 2024-10-17 | End: 2024-10-17

## 2024-10-17 RX ORDER — CINACALCET 30 MG/1
60 TABLET, FILM COATED ORAL DAILY
COMMUNITY

## 2024-10-17 RX ORDER — MORPHINE SULFATE 2 MG/ML
2 INJECTION, SOLUTION INTRAMUSCULAR; INTRAVENOUS EVERY 4 HOURS PRN
Status: DISCONTINUED | OUTPATIENT
Start: 2024-10-17 | End: 2024-10-22 | Stop reason: HOSPADM

## 2024-10-17 RX ADMIN — TOPIRAMATE 100 MG: 100 TABLET, FILM COATED ORAL at 22:22

## 2024-10-17 RX ADMIN — MORPHINE SULFATE 2 MG: 2 INJECTION, SOLUTION INTRAMUSCULAR; INTRAVENOUS at 22:23

## 2024-10-17 RX ADMIN — ROSUVASTATIN CALCIUM 5 MG: 5 TABLET, FILM COATED ORAL at 10:12

## 2024-10-17 RX ADMIN — METOCLOPRAMIDE 5 MG: 5 TABLET ORAL at 22:22

## 2024-10-17 RX ADMIN — TOPIRAMATE 100 MG: 100 TABLET, FILM COATED ORAL at 10:12

## 2024-10-17 RX ADMIN — DIPHENHYDRAMINE HYDROCHLORIDE 25 MG: 50 INJECTION INTRAMUSCULAR; INTRAVENOUS at 22:22

## 2024-10-17 RX ADMIN — MORPHINE SULFATE 2 MG: 2 INJECTION, SOLUTION INTRAMUSCULAR; INTRAVENOUS at 18:15

## 2024-10-17 RX ADMIN — SODIUM CHLORIDE, PRESERVATIVE FREE 10 ML: 5 INJECTION INTRAVENOUS at 22:23

## 2024-10-17 RX ADMIN — HEPARIN SODIUM 1000 UNITS: 1000 INJECTION INTRAVENOUS; SUBCUTANEOUS at 12:54

## 2024-10-17 RX ADMIN — METOCLOPRAMIDE 5 MG: 5 TABLET ORAL at 10:12

## 2024-10-17 RX ADMIN — HEPARIN SODIUM 5000 UNITS: 5000 INJECTION INTRAVENOUS; SUBCUTANEOUS at 18:14

## 2024-10-17 RX ADMIN — SODIUM CHLORIDE, PRESERVATIVE FREE 10 ML: 5 INJECTION INTRAVENOUS at 11:49

## 2024-10-17 RX ADMIN — HEPARIN SODIUM 1200 UNITS: 1000 INJECTION INTRAVENOUS; SUBCUTANEOUS at 12:55

## 2024-10-17 RX ADMIN — HEPARIN SODIUM 5000 UNITS: 5000 INJECTION INTRAVENOUS; SUBCUTANEOUS at 22:22

## 2024-10-17 RX ADMIN — METOCLOPRAMIDE 5 MG: 5 TABLET ORAL at 18:14

## 2024-10-17 ASSESSMENT — PAIN DESCRIPTION - LOCATION
LOCATION: NECK
LOCATION: NECK

## 2024-10-17 ASSESSMENT — PAIN SCALES - GENERAL
PAINLEVEL_OUTOF10: 8
PAINLEVEL_OUTOF10: 4
PAINLEVEL_OUTOF10: 9

## 2024-10-17 ASSESSMENT — PAIN DESCRIPTION - ORIENTATION
ORIENTATION: RIGHT
ORIENTATION: RIGHT

## 2024-10-17 ASSESSMENT — PAIN - FUNCTIONAL ASSESSMENT: PAIN_FUNCTIONAL_ASSESSMENT: ACTIVITIES ARE NOT PREVENTED

## 2024-10-17 ASSESSMENT — PAIN DESCRIPTION - DESCRIPTORS
DESCRIPTORS: ACHING
DESCRIPTORS: SORE

## 2024-10-17 NOTE — CARE COORDINATION
Case Management Assessment  Initial Evaluation    Date/Time of Evaluation: 10/17/2024 4:15 PM  Assessment Completed by: JUSTUS HODGE RN    If patient is discharged prior to next notation, then this note serves as note for discharge by case management.    Patient Name: Hayes Brock                   YOB: 1965  Diagnosis: Gastroparesis [K31.84]  ESRD needing dialysis (HCC) [N18.6, Z99.2]  Nausea and vomiting, unspecified vomiting type [R11.2]                   Date / Time: 10/16/2024  6:11 AM    Patient Admission Status: Inpatient   Readmission Risk (Low < 19, Mod (19-27), High > 27): Readmission Risk Score: 14.7    Current PCP: Misael Gibson MD  PCP verified by CM? Yes    Chart Reviewed: Yes      History Provided by: Patient  Patient Orientation: Alert and Oriented, Person, Place, Situation, Self    Patient Cognition: Alert    Hospitalization in the last 30 days (Readmission):  No    If yes, Readmission Assessment in CM Navigator will be completed.    Advance Directives:      Code Status: Full Code   Patient's Primary Decision Maker is: Legal Next of Kin      Discharge Planning:    Patient lives with: Spouse/Significant Other Type of Home: House  Primary Care Giver: Self  Patient Support Systems include: Spouse/Significant Other   Current Financial resources: None  Current community resources: None  Current services prior to admission: None            Current DME:              Type of Home Care services:  None    ADLS  Prior functional level: Independent in ADLs/IADLs  Current functional level: Assistance with the following:, Cooking, Housework, Shopping, Toileting    PT AM-PAC:   /24  OT AM-PAC: 24 /24    Family can provide assistance at DC: Yes  Would you like Case Management to discuss the discharge plan with any other family members/significant others, and if so, who? Yes (spouse)  Plans to Return to Present Housing: Yes  Other Identified Issues/Barriers to RETURNING to current

## 2024-10-17 NOTE — BRIEF OP NOTE
Brief Postoperative Note    Hayes Brock  YOB: 1965  [id number]    Pre-operative Diagnosis: AV fistula dysfunction, renal failure    Post-operative Diagnosis: Same    Procedure: Temp cath placement    Anesthesia: Local    Surgeons/Assistants: Kermit Muse MD     Estimated Blood Loss: minimal    Complications: none immediate    Specimens: were not obtained    Findings: Image-guided placement of a 14 fr x 15 cm Schon XL temp dialysis catheter via the right IJ.  Catheter is ready for use.      Electronically signed by Kermit Muse MD on 10/17/2024 at 1:06 PM

## 2024-10-18 ENCOUNTER — APPOINTMENT (OUTPATIENT)
Dept: INTERVENTIONAL RADIOLOGY/VASCULAR | Age: 59
DRG: 981 | End: 2024-10-18
Payer: OTHER GOVERNMENT

## 2024-10-18 ENCOUNTER — ANESTHESIA EVENT (OUTPATIENT)
Dept: OPERATING ROOM | Age: 59
End: 2024-10-18
Payer: OTHER GOVERNMENT

## 2024-10-18 ENCOUNTER — ANESTHESIA (OUTPATIENT)
Dept: OPERATING ROOM | Age: 59
End: 2024-10-18
Payer: OTHER GOVERNMENT

## 2024-10-18 PROBLEM — Z79.01 CHRONIC ANTICOAGULATION: Status: ACTIVE | Noted: 2024-10-18

## 2024-10-18 PROBLEM — R79.1 SUBTHERAPEUTIC INTERNATIONAL NORMALIZED RATIO (INR): Status: ACTIVE | Noted: 2024-10-18

## 2024-10-18 PROBLEM — T82.9XXA COMPLICATION OF AV DIALYSIS FISTULA, INITIAL ENCOUNTER: Status: ACTIVE | Noted: 2024-10-18

## 2024-10-18 LAB
ANION GAP SERPL CALCULATED.3IONS-SCNC: 13 MMOL/L (ref 9–17)
ANTI-XA UNFRAC HEPARIN: 0.25 IU/L (ref 0.3–0.7)
ANTI-XA UNFRAC HEPARIN: <0.1 IU/L (ref 0.3–0.7)
BUN SERPL-MCNC: 47 MG/DL (ref 6–20)
BUN/CREAT SERPL: 5 (ref 9–20)
CALCIUM SERPL-MCNC: 8.6 MG/DL (ref 8.6–10.4)
CHLORIDE SERPL-SCNC: 99 MMOL/L (ref 98–107)
CO2 SERPL-SCNC: 23 MMOL/L (ref 20–31)
CREAT SERPL-MCNC: 8.8 MG/DL (ref 0.7–1.2)
ERYTHROCYTE [DISTWIDTH] IN BLOOD BY AUTOMATED COUNT: 13.2 % (ref 11.8–14.4)
GFR, ESTIMATED: 6 ML/MIN/1.73M2
GLUCOSE BLD-MCNC: 118 MG/DL (ref 75–110)
GLUCOSE BLD-MCNC: 157 MG/DL (ref 75–110)
GLUCOSE BLD-MCNC: 183 MG/DL (ref 75–110)
GLUCOSE BLD-MCNC: 65 MG/DL (ref 75–110)
GLUCOSE BLD-MCNC: 82 MG/DL (ref 75–110)
GLUCOSE BLD-MCNC: 86 MG/DL (ref 75–110)
GLUCOSE SERPL-MCNC: 198 MG/DL (ref 70–99)
HCT VFR BLD AUTO: 34.5 % (ref 40.7–50.3)
HGB BLD-MCNC: 11.5 G/DL (ref 13–17)
INR PPP: 1.2
MCH RBC QN AUTO: 31.1 PG (ref 25.2–33.5)
MCHC RBC AUTO-ENTMCNC: 33.3 G/DL (ref 28.4–34.8)
MCV RBC AUTO: 93.2 FL (ref 82.6–102.9)
NRBC BLD-RTO: 0 PER 100 WBC
PARTIAL THROMBOPLASTIN TIME: 28.3 SEC (ref 23.9–33.8)
PLATELET # BLD AUTO: 177 K/UL (ref 138–453)
PMV BLD AUTO: 9.3 FL (ref 8.1–13.5)
POTASSIUM SERPL-SCNC: 4.6 MMOL/L (ref 3.7–5.3)
PROTHROMBIN TIME: 15.4 SEC (ref 11.5–14.2)
RBC # BLD AUTO: 3.7 M/UL (ref 4.21–5.77)
SODIUM SERPL-SCNC: 135 MMOL/L (ref 135–144)
WBC OTHER # BLD: 8.7 K/UL (ref 3.5–11.3)

## 2024-10-18 PROCEDURE — 05CF0ZZ EXTIRPATION OF MATTER FROM LEFT CEPHALIC VEIN, OPEN APPROACH: ICD-10-PCS | Performed by: SURGERY

## 2024-10-18 PROCEDURE — 7100000001 HC PACU RECOVERY - ADDTL 15 MIN: Performed by: SURGERY

## 2024-10-18 PROCEDURE — 36415 COLL VENOUS BLD VENIPUNCTURE: CPT

## 2024-10-18 PROCEDURE — 6370000000 HC RX 637 (ALT 250 FOR IP): Performed by: NURSE PRACTITIONER

## 2024-10-18 PROCEDURE — A4217 STERILE WATER/SALINE, 500 ML: HCPCS | Performed by: SURGERY

## 2024-10-18 PROCEDURE — C1757 CATH, THROMBECTOMY/EMBOLECT: HCPCS | Performed by: SURGERY

## 2024-10-18 PROCEDURE — 85730 THROMBOPLASTIN TIME PARTIAL: CPT

## 2024-10-18 PROCEDURE — 05LF0ZZ OCCLUSION OF LEFT CEPHALIC VEIN, OPEN APPROACH: ICD-10-PCS | Performed by: SURGERY

## 2024-10-18 PROCEDURE — 3600000002 HC SURGERY LEVEL 2 BASE: Performed by: SURGERY

## 2024-10-18 PROCEDURE — 6370000000 HC RX 637 (ALT 250 FOR IP): Performed by: SURGERY

## 2024-10-18 PROCEDURE — 6360000002 HC RX W HCPCS: Performed by: SURGERY

## 2024-10-18 PROCEDURE — 6360000002 HC RX W HCPCS: Performed by: NURSE ANESTHETIST, CERTIFIED REGISTERED

## 2024-10-18 PROCEDURE — 64417 NJX AA&/STRD AX NERVE IMG: CPT | Performed by: ANESTHESIOLOGY

## 2024-10-18 PROCEDURE — 85520 HEPARIN ASSAY: CPT

## 2024-10-18 PROCEDURE — 85027 COMPLETE CBC AUTOMATED: CPT

## 2024-10-18 PROCEDURE — 7100000000 HC PACU RECOVERY - FIRST 15 MIN: Performed by: SURGERY

## 2024-10-18 PROCEDURE — 6360000002 HC RX W HCPCS: Performed by: ANESTHESIOLOGY

## 2024-10-18 PROCEDURE — 2580000003 HC RX 258: Performed by: SURGERY

## 2024-10-18 PROCEDURE — 80048 BASIC METABOLIC PNL TOTAL CA: CPT

## 2024-10-18 PROCEDURE — 3600000012 HC SURGERY LEVEL 2 ADDTL 15MIN: Performed by: SURGERY

## 2024-10-18 PROCEDURE — 3700000000 HC ANESTHESIA ATTENDED CARE: Performed by: SURGERY

## 2024-10-18 PROCEDURE — 6360000002 HC RX W HCPCS: Performed by: NURSE PRACTITIONER

## 2024-10-18 PROCEDURE — 3700000001 HC ADD 15 MINUTES (ANESTHESIA): Performed by: SURGERY

## 2024-10-18 PROCEDURE — 82947 ASSAY GLUCOSE BLOOD QUANT: CPT

## 2024-10-18 PROCEDURE — 2500000003 HC RX 250 WO HCPCS: Performed by: ANESTHESIOLOGY

## 2024-10-18 PROCEDURE — 1200000000 HC SEMI PRIVATE

## 2024-10-18 PROCEDURE — 2580000003 HC RX 258: Performed by: NURSE ANESTHETIST, CERTIFIED REGISTERED

## 2024-10-18 PROCEDURE — 2500000003 HC RX 250 WO HCPCS: Performed by: SURGERY

## 2024-10-18 PROCEDURE — 88304 TISSUE EXAM BY PATHOLOGIST: CPT

## 2024-10-18 PROCEDURE — 85610 PROTHROMBIN TIME: CPT

## 2024-10-18 PROCEDURE — 2709999900 HC NON-CHARGEABLE SUPPLY: Performed by: SURGERY

## 2024-10-18 RX ORDER — HEPARIN SODIUM 1000 [USP'U]/ML
2000 INJECTION, SOLUTION INTRAVENOUS; SUBCUTANEOUS PRN
Status: DISCONTINUED | OUTPATIENT
Start: 2024-10-18 | End: 2024-10-22 | Stop reason: HOSPADM

## 2024-10-18 RX ORDER — FOLIC ACID 1 MG/1
1 TABLET ORAL DAILY
Status: DISCONTINUED | OUTPATIENT
Start: 2024-10-18 | End: 2024-10-22 | Stop reason: HOSPADM

## 2024-10-18 RX ORDER — FENTANYL CITRATE 50 UG/ML
INJECTION, SOLUTION INTRAMUSCULAR; INTRAVENOUS
Status: DISCONTINUED | OUTPATIENT
Start: 2024-10-18 | End: 2024-10-18 | Stop reason: SDUPTHER

## 2024-10-18 RX ORDER — MIDAZOLAM HYDROCHLORIDE 1 MG/ML
INJECTION INTRAMUSCULAR; INTRAVENOUS
Status: DISCONTINUED | OUTPATIENT
Start: 2024-10-18 | End: 2024-10-18 | Stop reason: SDUPTHER

## 2024-10-18 RX ORDER — POLYMYXIN B SULFATE 500000 [IU]/1
INJECTION, POWDER, LYOPHILIZED, FOR SOLUTION INTRAMUSCULAR; INTRATHECAL; INTRAVENOUS; OPHTHALMIC PRN
Status: DISCONTINUED | OUTPATIENT
Start: 2024-10-18 | End: 2024-10-18 | Stop reason: ALTCHOICE

## 2024-10-18 RX ORDER — LANOLIN ALCOHOL/MO/W.PET/CERES
400 CREAM (GRAM) TOPICAL 2 TIMES DAILY
Status: DISCONTINUED | OUTPATIENT
Start: 2024-10-18 | End: 2024-10-22 | Stop reason: HOSPADM

## 2024-10-18 RX ORDER — HEPARIN SODIUM 10000 [USP'U]/100ML
5-30 INJECTION, SOLUTION INTRAVENOUS CONTINUOUS
Status: DISCONTINUED | OUTPATIENT
Start: 2024-10-18 | End: 2024-10-22 | Stop reason: HOSPADM

## 2024-10-18 RX ORDER — SODIUM CHLORIDE 9 MG/ML
INJECTION, SOLUTION INTRAVENOUS
Status: DISCONTINUED | OUTPATIENT
Start: 2024-10-18 | End: 2024-10-18 | Stop reason: SDUPTHER

## 2024-10-18 RX ORDER — HEPARIN SODIUM 1000 [USP'U]/ML
4000 INJECTION, SOLUTION INTRAVENOUS; SUBCUTANEOUS PRN
Status: DISCONTINUED | OUTPATIENT
Start: 2024-10-18 | End: 2024-10-22 | Stop reason: HOSPADM

## 2024-10-18 RX ORDER — TRAZODONE HYDROCHLORIDE 50 MG/1
50 TABLET, FILM COATED ORAL NIGHTLY
Status: DISCONTINUED | OUTPATIENT
Start: 2024-10-18 | End: 2024-10-22 | Stop reason: HOSPADM

## 2024-10-18 RX ORDER — WARFARIN SODIUM 10 MG/1
10 TABLET ORAL
Status: COMPLETED | OUTPATIENT
Start: 2024-10-18 | End: 2024-10-18

## 2024-10-18 RX ORDER — NIFEDIPINE 30 MG/1
60 TABLET, EXTENDED RELEASE ORAL DAILY
Status: DISCONTINUED | OUTPATIENT
Start: 2024-10-19 | End: 2024-10-22 | Stop reason: HOSPADM

## 2024-10-18 RX ORDER — VITAMIN B COMPLEX
1000 TABLET ORAL DAILY
Status: DISCONTINUED | OUTPATIENT
Start: 2024-10-18 | End: 2024-10-22 | Stop reason: HOSPADM

## 2024-10-18 RX ORDER — HEPARIN SODIUM 1000 [USP'U]/ML
INJECTION, SOLUTION INTRAVENOUS; SUBCUTANEOUS
Status: DISCONTINUED | OUTPATIENT
Start: 2024-10-18 | End: 2024-10-18 | Stop reason: SDUPTHER

## 2024-10-18 RX ORDER — HYDRALAZINE HYDROCHLORIDE 50 MG/1
100 TABLET, FILM COATED ORAL EVERY 8 HOURS
Status: DISCONTINUED | OUTPATIENT
Start: 2024-10-18 | End: 2024-10-22

## 2024-10-18 RX ORDER — HEPARIN SODIUM 1000 [USP'U]/ML
4000 INJECTION, SOLUTION INTRAVENOUS; SUBCUTANEOUS ONCE
Status: DISCONTINUED | OUTPATIENT
Start: 2024-10-18 | End: 2024-10-22 | Stop reason: HOSPADM

## 2024-10-18 RX ORDER — DIPHENHYDRAMINE HCL 25 MG
25 TABLET ORAL EVERY 6 HOURS PRN
Status: DISCONTINUED | OUTPATIENT
Start: 2024-10-18 | End: 2024-10-22 | Stop reason: HOSPADM

## 2024-10-18 RX ORDER — CEFAZOLIN SODIUM 1 G/3ML
INJECTION, POWDER, FOR SOLUTION INTRAMUSCULAR; INTRAVENOUS
Status: DISCONTINUED | OUTPATIENT
Start: 2024-10-18 | End: 2024-10-18 | Stop reason: SDUPTHER

## 2024-10-18 RX ORDER — ROPIVACAINE HYDROCHLORIDE 5 MG/ML
INJECTION, SOLUTION EPIDURAL; INFILTRATION; PERINEURAL
Status: DISCONTINUED | OUTPATIENT
Start: 2024-10-18 | End: 2024-10-18 | Stop reason: SDUPTHER

## 2024-10-18 RX ORDER — SEVELAMER CARBONATE 800 MG/1
800 TABLET, FILM COATED ORAL
Status: DISCONTINUED | OUTPATIENT
Start: 2024-10-18 | End: 2024-10-22 | Stop reason: HOSPADM

## 2024-10-18 RX ORDER — LIDOCAINE HYDROCHLORIDE 20 MG/ML
INJECTION, SOLUTION EPIDURAL; INFILTRATION; INTRACAUDAL; PERINEURAL
Status: DISCONTINUED | OUTPATIENT
Start: 2024-10-18 | End: 2024-10-18 | Stop reason: SDUPTHER

## 2024-10-18 RX ADMIN — METOCLOPRAMIDE 5 MG: 5 TABLET ORAL at 08:29

## 2024-10-18 RX ADMIN — Medication 400 MG: at 22:01

## 2024-10-18 RX ADMIN — LIDOCAINE HYDROCHLORIDE 15 ML: 20 INJECTION, SOLUTION EPIDURAL; INFILTRATION; INTRACAUDAL; PERINEURAL at 13:36

## 2024-10-18 RX ADMIN — ROPIVACAINE HYDROCHLORIDE 15 ML: 5 INJECTION, SOLUTION EPIDURAL; INFILTRATION; PERINEURAL at 13:36

## 2024-10-18 RX ADMIN — TOPIRAMATE 100 MG: 100 TABLET, FILM COATED ORAL at 08:29

## 2024-10-18 RX ADMIN — MORPHINE SULFATE 2 MG: 2 INJECTION, SOLUTION INTRAMUSCULAR; INTRAVENOUS at 04:25

## 2024-10-18 RX ADMIN — DIPHENHYDRAMINE HYDROCHLORIDE 25 MG: 50 INJECTION INTRAMUSCULAR; INTRAVENOUS at 04:25

## 2024-10-18 RX ADMIN — SODIUM CHLORIDE, PRESERVATIVE FREE 10 ML: 5 INJECTION INTRAVENOUS at 22:01

## 2024-10-18 RX ADMIN — PANTOPRAZOLE SODIUM 40 MG: 40 TABLET, DELAYED RELEASE ORAL at 05:56

## 2024-10-18 RX ADMIN — MIDAZOLAM 1 MG: 1 INJECTION INTRAMUSCULAR; INTRAVENOUS at 13:45

## 2024-10-18 RX ADMIN — DIPHENHYDRAMINE HCL 25 MG: 25 TABLET ORAL at 17:50

## 2024-10-18 RX ADMIN — CEFAZOLIN 2 G: 1 INJECTION, POWDER, FOR SOLUTION INTRAMUSCULAR; INTRAVENOUS at 14:04

## 2024-10-18 RX ADMIN — INSULIN GLARGINE 25 UNITS: 100 INJECTION, SOLUTION SUBCUTANEOUS at 08:29

## 2024-10-18 RX ADMIN — HEPARIN SODIUM 2000 UNITS: 1000 INJECTION INTRAVENOUS; SUBCUTANEOUS at 23:56

## 2024-10-18 RX ADMIN — HEPARIN SODIUM 2000 UNITS: 1000 INJECTION INTRAVENOUS; SUBCUTANEOUS at 14:59

## 2024-10-18 RX ADMIN — TOPIRAMATE 100 MG: 100 TABLET, FILM COATED ORAL at 22:01

## 2024-10-18 RX ADMIN — FENTANYL CITRATE 50 MCG: 50 INJECTION INTRAMUSCULAR; INTRAVENOUS at 13:45

## 2024-10-18 RX ADMIN — TRAZODONE HYDROCHLORIDE 50 MG: 50 TABLET ORAL at 22:01

## 2024-10-18 RX ADMIN — ROSUVASTATIN CALCIUM 5 MG: 5 TABLET, FILM COATED ORAL at 08:29

## 2024-10-18 RX ADMIN — FENTANYL CITRATE 50 MCG: 50 INJECTION INTRAMUSCULAR; INTRAVENOUS at 14:32

## 2024-10-18 RX ADMIN — DIPHENHYDRAMINE HCL 25 MG: 25 TABLET ORAL at 22:28

## 2024-10-18 RX ADMIN — HEPARIN SODIUM 5000 UNITS: 5000 INJECTION INTRAVENOUS; SUBCUTANEOUS at 05:56

## 2024-10-18 RX ADMIN — HYDRALAZINE HYDROCHLORIDE 100 MG: 50 TABLET ORAL at 17:50

## 2024-10-18 RX ADMIN — WARFARIN SODIUM 10 MG: 10 TABLET ORAL at 17:50

## 2024-10-18 RX ADMIN — HEPARIN SODIUM 5000 UNITS: 1000 INJECTION INTRAVENOUS; SUBCUTANEOUS at 14:20

## 2024-10-18 RX ADMIN — SODIUM CHLORIDE: 9 INJECTION, SOLUTION INTRAVENOUS at 13:45

## 2024-10-18 RX ADMIN — HEPARIN SODIUM 9 UNITS/KG/HR: 10000 INJECTION, SOLUTION INTRAVENOUS at 17:08

## 2024-10-18 RX ADMIN — GLIPIZIDE 10 MG: 10 TABLET ORAL at 05:56

## 2024-10-18 RX ADMIN — MORPHINE SULFATE 2 MG: 2 INJECTION, SOLUTION INTRAMUSCULAR; INTRAVENOUS at 22:28

## 2024-10-18 RX ADMIN — FOLIC ACID 1 MG: 1 TABLET ORAL at 17:50

## 2024-10-18 ASSESSMENT — PAIN SCALES - GENERAL
PAINLEVEL_OUTOF10: 8
PAINLEVEL_OUTOF10: 4
PAINLEVEL_OUTOF10: 7
PAINLEVEL_OUTOF10: 8

## 2024-10-18 ASSESSMENT — PAIN - FUNCTIONAL ASSESSMENT
PAIN_FUNCTIONAL_ASSESSMENT: ACTIVITIES ARE NOT PREVENTED
PAIN_FUNCTIONAL_ASSESSMENT: ACTIVITIES ARE NOT PREVENTED

## 2024-10-18 ASSESSMENT — PAIN DESCRIPTION - LOCATION
LOCATION: NECK
LOCATION: NECK

## 2024-10-18 ASSESSMENT — PAIN DESCRIPTION - ORIENTATION
ORIENTATION: RIGHT
ORIENTATION: RIGHT

## 2024-10-18 ASSESSMENT — PAIN DESCRIPTION - DESCRIPTORS
DESCRIPTORS: ACHING;DISCOMFORT
DESCRIPTORS: ACHING;DISCOMFORT

## 2024-10-18 ASSESSMENT — PAIN DESCRIPTION - ONSET: ONSET: ON-GOING

## 2024-10-18 ASSESSMENT — PAIN DESCRIPTION - PAIN TYPE: TYPE: ACUTE PAIN

## 2024-10-18 ASSESSMENT — PAIN DESCRIPTION - FREQUENCY: FREQUENCY: CONTINUOUS

## 2024-10-18 NOTE — ANESTHESIA PROCEDURE NOTES
Peripheral Block    Patient location during procedure: pre-op  Reason for block: post-op pain management, primary anesthetic and at surgeon's request  Start time: 10/18/2024 1:36 PM  End time: 10/18/2024 1:40 PM  Staffing  Performed: anesthesiologist   Anesthesiologist: Ernestina Bhagat MD  Performed by: Ernestina Bhagat MD  Authorized by: Ernestina Bhagat MD    Preanesthetic Checklist  Completed: patient identified, IV checked, site marked, risks and benefits discussed, surgical/procedural consents, equipment checked, pre-op evaluation, timeout performed, anesthesia consent given, oxygen available, monitors applied/VS acknowledged, fire risk safety assessment completed and verbalized and blood product R/B/A discussed and consented  Peripheral Block   Patient position: supine  Prep: ChloraPrep  Provider prep: sterile gloves and mask  Patient monitoring: cardiac monitor, continuous pulse ox, IV access and oxygen  Block type: Axillary  Laterality: left  Injection technique: single-shot  Guidance: ultrasound guided    Needle   Needle type: insulated echogenic nerve stimulator needle   Needle localization: ultrasound guidance  Assessment   Injection assessment: negative aspiration for heme, no paresthesia on injection, local visualized surrounding nerve on ultrasound and no intravascular symptoms  Paresthesia pain: none  Slow fractionated injection: yes  Hemodynamics: stable  Outcomes: uncomplicated and patient tolerated procedure well    Medications Administered  ropivacaine (NAROPIN) injection 0.5% - Perineural   15 mL - 10/18/2024 1:36:00 PM  lidocaine 2 % (PF) injection - Perineural   15 mL - 10/18/2024 1:36:00 PM

## 2024-10-18 NOTE — CONSULTS
Nephrology ESRD Consult Note    Reason for Consult:  End stage renal disease, chills and abdominal discomfort and negative for influenza A and influenza B and COVID-19, fluid and electrolyte management  Requesting Physician: Blood    Chief Complaint: Abdominal discomfort and nausea and fevers chills  History Obtained From:  patient, electronic medical record    History of Present Illness:              This is a 59 y.o. male with end stage renal disease on hemodialysis [facility] who presents with missed dialysis treatments 2 out of the last 3 treatments.  I did speak with the patient's dialysis unit, [facility].  The patient dialyzes under Dr. Mcclelland.  They state that he usually does not miss dialysis treatments.  He did go to dialysis on Saturday, 10/12/2024.  However, he missed dialysis on 10/10/2024 and 10/12/2024.    Patient's outpatient estimated dry weight was 103.5 kg.  The dialysis unit stated his postdialysis weight was 103 kg on 10/12/2024.  Patient has an AV fistula for dialysis access.  He states he has been on hemodialysis for about 2 years.  He denies any history of pancreatitis.  He denies any history of abdominal surgery.  He does complain of chills.  He is also significantly hypertensive with systolic blood pressure in the 190s.  We will give the patient hydralazine 20 mg IV push x 1 for his hypertension.  He does associate headache with his current complaints.  He is currently afebrile with temperature 36.4 °C.  The patient complains of nausea and is spitting up mucus but no nae emesis at this time.    Past medical history includes end-stage renal disease on maintenance hemodialysis documented above, type 2 diabetes mellitus, diabetic gastroparesis, hyperlipidemia, erectile dysfunction, headache, obesity, primary hypertension.  He also has a history of secondary hyperparathyroidism and anemia of chronic disease.  He also has a history of headache.     Past surgical 
157* 175* 100* 198*   BUN 49* 62* 66* 47*   CREATININE 9.5* 11.0* 11.3* 8.8*   ANIONGAP 15 20* 13 13   LABGLOM 6* 5* 5* 6*   CALCIUM 8.9 9.1 8.6 8.6   TROPHS 55* 52*  --   --      Recent Labs     10/15/24  1229 10/16/24  0649   AST 16 17   ALT 10 11   ALKPHOS 109 108   BILITOT 0.5 0.6   BILIDIR 0.1 0.1   LIPASE 32 40     Glucose:  Recent Labs     10/16/24  1954 10/17/24  0628 10/17/24  1150 10/17/24  2008 10/18/24  0558 10/18/24  1141   POCGLU 133* 103 110 221* 183* 86         Assessment:     Primary Problem  ESRD needing dialysis (HCC)  59-year-old male with thrombosed left native vein forearm AV fistula    Active Hospital Problems    Diagnosis Date Noted    ESRD needing dialysis (HCC) [N18.6, Z99.2] 10/16/2024    Class 2 severe obesity due to excess calories with serious comorbidity and body mass index (BMI) of 35.0 to 35.9 in adult [E66.812, E66.01, Z68.35]     Stage 3 chronic kidney disease (HCC) [N18.30]     Essential hypertension [I10]     Acute kidney injury superimposed on chronic kidney disease (HCC) [N17.9, N18.9]     Type 2 diabetes mellitus with stage 3 chronic kidney disease, with long-term current use of insulin (HCC) [E11.22, N18.30, Z79.4]        Plan:     I discussed the above with him we will plan an open attempted declot today.  Risks and benefits were discussed and the wishes to proceed.  He understands the fistula may not be salvageable.  Plan declot today.      Electronically signed by Chris Gates MD on 10/18/2024 at 12:19 PM     Copy sent to Dr. Gibson, Misael CASTRO MD

## 2024-10-18 NOTE — ANESTHESIA POSTPROCEDURE EVALUATION
Department of Anesthesiology  Postprocedure Note    Patient: Hayes Brock  MRN: [id number]  YOB: 1965  Date of evaluation: 10/18/2024    Procedure Summary       Date: 10/18/24 Room / Location: [address]    Anesthesia Start: 1346 Anesthesia Stop: 1524    Procedure: ARTERIOVENOUS FISTULA THROMBECTOMY (Left) Diagnosis:       Clot      (Clot [I82.90])    Surgeons: Chris Gates MD Responsible Provider: Ernestina Bhagat MD    Anesthesia Type: regional ASA Status: 3            Anesthesia Type: No value filed.    Crystal Phase I: Crystal Score: 10    Crystal Phase II:      Anesthesia Post Evaluation    Patient location during evaluation: PACU  Patient participation: complete - patient participated  Level of consciousness: awake and alert  Airway patency: patent  Nausea & Vomiting: no nausea and no vomiting  Cardiovascular status: hemodynamically stable  Respiratory status: acceptable  Hydration status: euvolemic  Pain management: adequate    No notable events documented.

## 2024-10-18 NOTE — ANESTHESIA PRE PROCEDURE
(REGLAN) 10 MG tablet Take 1 tablet by mouth 4 times daily WARNING:  May cause drowsiness.  May impair ability to operate vehicles or machinery.  Do not use in combination with alcohol.  Patient not taking: Reported on 10/16/2024 10/15/24   Zion Solis MD   pantoprazole (PROTONIX) 40 MG tablet Take 1 tablet by mouth 2 times daily 7/21/21   Efe Staples MD   tadalafil (CIALIS) 20 MG tablet Take 1 tablet by mouth as needed for Erectile Dysfunction 2/25/21   Efe Staples MD   SUMAtriptan (IMITREX) 100 MG tablet Take 1 tablet by mouth as needed for Migraine 7/21/21   Efe Staples MD   topiramate (TOPAMAX) 100 MG tablet Take 1 tablet by mouth 2 times daily    Efe Staples MD   vitamin D (CHOLECALCIFEROL) 25 MCG (1000 UT) TABS tablet Take 1,000 Units by mouth daily    Efe Staples MD   psyllium (KONSYL) 28.3 % PACK Take 1 packet by mouth daily as needed for Constipation    Efe Staples MD   rosuvastatin (CRESTOR) 10 MG tablet Take 0.5 tablets by mouth daily    Efe Staples MD   insulin glargine (LANTUS) 100 UNIT/ML injection vial Inject 25 Units into the skin 2 times daily    ProviderEfe MD       Current medications:    Current Facility-Administered Medications   Medication Dose Route Frequency Provider Last Rate Last Admin   • heparin (porcine) injection 4,000 Units  4,000 Units IntraVENous Once Allegra Al APRN - CNP       • heparin (porcine) injection 4,000 Units  4,000 Units IntraVENous PRN Allegra Al APRN - CNP       • heparin (porcine) injection 2,000 Units  2,000 Units IntraVENous PRN Allegra Al APRN - CNP       • heparin 25,000 units in dextrose 5% 250 mL (premix) infusion  5-30 Units/kg/hr IntraVENous Continuous Allegra Al APRN - CNP       • anticoagulant sodium citrate 4 % injection 1 mL  1 mL IntraCATHeter PRN Juan Francisco Dubon MD       • anticoagulant sodium citrate 4 % injection 1.2 mL  1.2 mL IntraCATHeter 
Previously Declined (within the last year)

## 2024-10-18 NOTE — BRIEF OP NOTE
Brief Postoperative Note      Patient: Hayes Brock  YOB: 1965  MRN: [id number]    Date of Procedure: 10/18/2024    Pre-Op Diagnosis Codes:      * Clot [I82.90]  Thrombosed left radiocephalic AV fistula    Post-Op Diagnosis: Same       Procedure(s):  Left radiocephalic AV fistula thrombectomy  Left AV fistula sidebranch ligation x 2    Surgeon(s):  Chris Gates MD    Assistant:  Surgical Assistant: Tessa Christensen    Anesthesia: General    Estimated Blood Loss (mL): less than 100     Complications: None    Specimens:   ID Type Source Tests Collected by Time Destination   A : LEFT AV FISTULA CLOT Tissue Arm SURGICAL PATHOLOGY Chris Gates MD 10/18/2024 1439        Implants:  * No implants in log *      Drains: * No LDAs found *    Findings:  Infection Present At Time Of Surgery (PATOS) (choose all levels that have infection present):  No infection present  Other Findings: Fresh thrombus within the left forearm AV fistula with excellent backflow.  Flushed easily.  Two large sidebranches ligated.    Electronically signed by Chris Gates MD on 10/18/2024 at 3:18 PM

## 2024-10-19 LAB
ANION GAP SERPL CALCULATED.3IONS-SCNC: 15 MMOL/L (ref 9–17)
ANTI-XA UNFRAC HEPARIN: 0.26 IU/L (ref 0.3–0.7)
ANTI-XA UNFRAC HEPARIN: 0.27 IU/L (ref 0.3–0.7)
ANTI-XA UNFRAC HEPARIN: 0.39 IU/L (ref 0.3–0.7)
BUN SERPL-MCNC: 57 MG/DL (ref 6–20)
BUN/CREAT SERPL: 6 (ref 9–20)
CALCIUM SERPL-MCNC: 8.4 MG/DL (ref 8.6–10.4)
CHLORIDE SERPL-SCNC: 98 MMOL/L (ref 98–107)
CO2 SERPL-SCNC: 21 MMOL/L (ref 20–31)
CREAT SERPL-MCNC: 10 MG/DL (ref 0.7–1.2)
GFR, ESTIMATED: 6 ML/MIN/1.73M2
GLUCOSE BLD-MCNC: 105 MG/DL (ref 75–110)
GLUCOSE BLD-MCNC: 108 MG/DL (ref 75–110)
GLUCOSE BLD-MCNC: 149 MG/DL (ref 75–110)
GLUCOSE SERPL-MCNC: 95 MG/DL (ref 70–99)
INR PPP: 1.3
POTASSIUM SERPL-SCNC: 4.3 MMOL/L (ref 3.7–5.3)
PROTHROMBIN TIME: 15.7 SEC (ref 11.5–14.2)
SODIUM SERPL-SCNC: 134 MMOL/L (ref 135–144)

## 2024-10-19 PROCEDURE — 1200000000 HC SEMI PRIVATE

## 2024-10-19 PROCEDURE — 6360000002 HC RX W HCPCS: Performed by: SURGERY

## 2024-10-19 PROCEDURE — 36415 COLL VENOUS BLD VENIPUNCTURE: CPT

## 2024-10-19 PROCEDURE — 6370000000 HC RX 637 (ALT 250 FOR IP): Performed by: INTERNAL MEDICINE

## 2024-10-19 PROCEDURE — 80048 BASIC METABOLIC PNL TOTAL CA: CPT

## 2024-10-19 PROCEDURE — 6370000000 HC RX 637 (ALT 250 FOR IP): Performed by: SURGERY

## 2024-10-19 PROCEDURE — 85520 HEPARIN ASSAY: CPT

## 2024-10-19 PROCEDURE — 6370000000 HC RX 637 (ALT 250 FOR IP): Performed by: NURSE PRACTITIONER

## 2024-10-19 PROCEDURE — 85610 PROTHROMBIN TIME: CPT

## 2024-10-19 PROCEDURE — 2500000003 HC RX 250 WO HCPCS: Performed by: SURGERY

## 2024-10-19 PROCEDURE — 82947 ASSAY GLUCOSE BLOOD QUANT: CPT

## 2024-10-19 PROCEDURE — 90935 HEMODIALYSIS ONE EVALUATION: CPT

## 2024-10-19 RX ORDER — HYDROCODONE BITARTRATE AND ACETAMINOPHEN 5; 325 MG/1; MG/1
1 TABLET ORAL ONCE
Status: COMPLETED | OUTPATIENT
Start: 2024-10-19 | End: 2024-10-19

## 2024-10-19 RX ORDER — WARFARIN SODIUM 7.5 MG/1
7.5 TABLET ORAL
Status: COMPLETED | OUTPATIENT
Start: 2024-10-19 | End: 2024-10-19

## 2024-10-19 RX ORDER — HYDROCODONE BITARTRATE AND ACETAMINOPHEN 5; 325 MG/1; MG/1
1 TABLET ORAL EVERY 4 HOURS PRN
Status: DISCONTINUED | OUTPATIENT
Start: 2024-10-19 | End: 2024-10-20

## 2024-10-19 RX ADMIN — HEPARIN SODIUM 13 UNITS/KG/HR: 10000 INJECTION, SOLUTION INTRAVENOUS at 14:19

## 2024-10-19 RX ADMIN — Medication 1.2 ML: at 14:02

## 2024-10-19 RX ADMIN — WARFARIN SODIUM 7.5 MG: 7.5 TABLET ORAL at 17:42

## 2024-10-19 RX ADMIN — TRAZODONE HYDROCHLORIDE 50 MG: 50 TABLET ORAL at 21:23

## 2024-10-19 RX ADMIN — INSULIN GLARGINE 25 UNITS: 100 INJECTION, SOLUTION SUBCUTANEOUS at 09:20

## 2024-10-19 RX ADMIN — HYDROCODONE BITARTRATE AND ACETAMINOPHEN 1 TABLET: 5; 325 TABLET ORAL at 14:23

## 2024-10-19 RX ADMIN — HEPARIN SODIUM 2000 UNITS: 1000 INJECTION INTRAVENOUS; SUBCUTANEOUS at 08:16

## 2024-10-19 RX ADMIN — MORPHINE SULFATE 2 MG: 2 INJECTION, SOLUTION INTRAMUSCULAR; INTRAVENOUS at 08:17

## 2024-10-19 RX ADMIN — METOCLOPRAMIDE 5 MG: 5 TABLET ORAL at 00:22

## 2024-10-19 RX ADMIN — HYDRALAZINE HYDROCHLORIDE 100 MG: 50 TABLET ORAL at 03:27

## 2024-10-19 RX ADMIN — Medication 400 MG: at 21:23

## 2024-10-19 RX ADMIN — GLIPIZIDE 10 MG: 10 TABLET ORAL at 05:38

## 2024-10-19 RX ADMIN — TOPIRAMATE 100 MG: 100 TABLET, FILM COATED ORAL at 08:18

## 2024-10-19 RX ADMIN — ROSUVASTATIN CALCIUM 5 MG: 5 TABLET, FILM COATED ORAL at 08:18

## 2024-10-19 RX ADMIN — Medication 1000 UNITS: at 08:18

## 2024-10-19 RX ADMIN — Medication 1 ML: at 14:01

## 2024-10-19 RX ADMIN — FOLIC ACID 1 MG: 1 TABLET ORAL at 08:18

## 2024-10-19 RX ADMIN — METOCLOPRAMIDE 5 MG: 5 TABLET ORAL at 21:23

## 2024-10-19 RX ADMIN — HYDROCODONE BITARTRATE AND ACETAMINOPHEN 1 TABLET: 5; 325 TABLET ORAL at 05:38

## 2024-10-19 RX ADMIN — DIPHENHYDRAMINE HCL 25 MG: 25 TABLET ORAL at 05:38

## 2024-10-19 RX ADMIN — Medication 400 MG: at 08:18

## 2024-10-19 RX ADMIN — SEVELAMER CARBONATE 800 MG: 800 TABLET, FILM COATED ORAL at 16:54

## 2024-10-19 RX ADMIN — METOCLOPRAMIDE 5 MG: 5 TABLET ORAL at 14:24

## 2024-10-19 RX ADMIN — SEVELAMER CARBONATE 800 MG: 800 TABLET, FILM COATED ORAL at 08:18

## 2024-10-19 RX ADMIN — METOCLOPRAMIDE 5 MG: 5 TABLET ORAL at 16:54

## 2024-10-19 RX ADMIN — HEPARIN SODIUM 2000 UNITS: 1000 INJECTION INTRAVENOUS; SUBCUTANEOUS at 21:27

## 2024-10-19 RX ADMIN — HYDRALAZINE HYDROCHLORIDE 100 MG: 50 TABLET ORAL at 17:42

## 2024-10-19 RX ADMIN — MORPHINE SULFATE 2 MG: 2 INJECTION, SOLUTION INTRAMUSCULAR; INTRAVENOUS at 03:29

## 2024-10-19 RX ADMIN — TOPIRAMATE 100 MG: 100 TABLET, FILM COATED ORAL at 21:23

## 2024-10-19 RX ADMIN — METOCLOPRAMIDE 5 MG: 5 TABLET ORAL at 08:18

## 2024-10-19 RX ADMIN — PANTOPRAZOLE SODIUM 40 MG: 40 TABLET, DELAYED RELEASE ORAL at 05:38

## 2024-10-19 ASSESSMENT — PAIN SCALES - GENERAL
PAINLEVEL_OUTOF10: 6
PAINLEVEL_OUTOF10: 7
PAINLEVEL_OUTOF10: 5
PAINLEVEL_OUTOF10: 6
PAINLEVEL_OUTOF10: 7
PAINLEVEL_OUTOF10: 5
PAINLEVEL_OUTOF10: 5
PAINLEVEL_OUTOF10: 8
PAINLEVEL_OUTOF10: 7
PAINLEVEL_OUTOF10: 7
PAINLEVEL_OUTOF10: 5
PAINLEVEL_OUTOF10: 7

## 2024-10-19 ASSESSMENT — PAIN DESCRIPTION - ONSET: ONSET: ON-GOING

## 2024-10-19 ASSESSMENT — PAIN DESCRIPTION - FREQUENCY: FREQUENCY: CONTINUOUS

## 2024-10-19 ASSESSMENT — PAIN DESCRIPTION - ORIENTATION
ORIENTATION: RIGHT
ORIENTATION: LEFT;RIGHT
ORIENTATION: RIGHT;LEFT
ORIENTATION: RIGHT;LEFT

## 2024-10-19 ASSESSMENT — PAIN DESCRIPTION - PAIN TYPE
TYPE: ACUTE PAIN

## 2024-10-19 ASSESSMENT — PAIN DESCRIPTION - DESCRIPTORS
DESCRIPTORS: ACHING;DISCOMFORT
DESCRIPTORS: ACHING
DESCRIPTORS: ACHING;DISCOMFORT
DESCRIPTORS: ACHING;DISCOMFORT

## 2024-10-19 ASSESSMENT — PAIN DESCRIPTION - LOCATION
LOCATION: NECK;ARM
LOCATION: NECK;ARM
LOCATION: ARM;NECK
LOCATION: NECK

## 2024-10-20 LAB
ANTI-XA UNFRAC HEPARIN: 0.43 IU/L (ref 0.3–0.7)
ANTI-XA UNFRAC HEPARIN: 0.44 IU/L (ref 0.3–0.7)
ERYTHROCYTE [DISTWIDTH] IN BLOOD BY AUTOMATED COUNT: 13.2 % (ref 11.8–14.4)
GLUCOSE BLD-MCNC: 116 MG/DL (ref 75–110)
GLUCOSE BLD-MCNC: 144 MG/DL (ref 75–110)
GLUCOSE BLD-MCNC: 145 MG/DL (ref 75–110)
GLUCOSE BLD-MCNC: 159 MG/DL (ref 75–110)
HCT VFR BLD AUTO: 28.8 % (ref 40.7–50.3)
HCT VFR BLD AUTO: 29.7 % (ref 40.7–50.3)
HGB BLD-MCNC: 9.9 G/DL (ref 13–17)
HGB BLD-MCNC: 9.9 G/DL (ref 13–17)
INR PPP: 1.3
MCH RBC QN AUTO: 31 PG (ref 25.2–33.5)
MCHC RBC AUTO-ENTMCNC: 33.3 G/DL (ref 28.4–34.8)
MCV RBC AUTO: 93.1 FL (ref 82.6–102.9)
NRBC BLD-RTO: 0 PER 100 WBC
PLATELET # BLD AUTO: 144 K/UL (ref 138–453)
PMV BLD AUTO: 9.5 FL (ref 8.1–13.5)
PROTHROMBIN TIME: 16.5 SEC (ref 11.5–14.2)
RBC # BLD AUTO: 3.19 M/UL (ref 4.21–5.77)
WBC OTHER # BLD: 6 K/UL (ref 3.5–11.3)

## 2024-10-20 PROCEDURE — 85018 HEMOGLOBIN: CPT

## 2024-10-20 PROCEDURE — 6370000000 HC RX 637 (ALT 250 FOR IP): Performed by: SURGERY

## 2024-10-20 PROCEDURE — 1200000000 HC SEMI PRIVATE

## 2024-10-20 PROCEDURE — 85520 HEPARIN ASSAY: CPT

## 2024-10-20 PROCEDURE — 6370000000 HC RX 637 (ALT 250 FOR IP): Performed by: INTERNAL MEDICINE

## 2024-10-20 PROCEDURE — 6360000002 HC RX W HCPCS: Performed by: SURGERY

## 2024-10-20 PROCEDURE — 85610 PROTHROMBIN TIME: CPT

## 2024-10-20 PROCEDURE — 85014 HEMATOCRIT: CPT

## 2024-10-20 PROCEDURE — 36415 COLL VENOUS BLD VENIPUNCTURE: CPT

## 2024-10-20 PROCEDURE — 85027 COMPLETE CBC AUTOMATED: CPT

## 2024-10-20 PROCEDURE — 82947 ASSAY GLUCOSE BLOOD QUANT: CPT

## 2024-10-20 PROCEDURE — 6370000000 HC RX 637 (ALT 250 FOR IP): Performed by: NURSE PRACTITIONER

## 2024-10-20 PROCEDURE — 2580000003 HC RX 258: Performed by: SURGERY

## 2024-10-20 RX ORDER — WARFARIN SODIUM 7.5 MG/1
7.5 TABLET ORAL
Status: COMPLETED | OUTPATIENT
Start: 2024-10-20 | End: 2024-10-20

## 2024-10-20 RX ORDER — HYDROCODONE BITARTRATE AND ACETAMINOPHEN 5; 325 MG/1; MG/1
2 TABLET ORAL EVERY 4 HOURS PRN
Status: DISCONTINUED | OUTPATIENT
Start: 2024-10-20 | End: 2024-10-22 | Stop reason: HOSPADM

## 2024-10-20 RX ORDER — HYDROCODONE BITARTRATE AND ACETAMINOPHEN 5; 325 MG/1; MG/1
1 TABLET ORAL EVERY 4 HOURS PRN
Status: DISCONTINUED | OUTPATIENT
Start: 2024-10-20 | End: 2024-10-22 | Stop reason: HOSPADM

## 2024-10-20 RX ADMIN — MORPHINE SULFATE 2 MG: 2 INJECTION, SOLUTION INTRAMUSCULAR; INTRAVENOUS at 08:10

## 2024-10-20 RX ADMIN — METOCLOPRAMIDE 5 MG: 5 TABLET ORAL at 12:56

## 2024-10-20 RX ADMIN — ROSUVASTATIN CALCIUM 5 MG: 5 TABLET, FILM COATED ORAL at 09:42

## 2024-10-20 RX ADMIN — HYDRALAZINE HYDROCHLORIDE 100 MG: 50 TABLET ORAL at 09:40

## 2024-10-20 RX ADMIN — METOCLOPRAMIDE 5 MG: 5 TABLET ORAL at 17:29

## 2024-10-20 RX ADMIN — TRAZODONE HYDROCHLORIDE 50 MG: 50 TABLET ORAL at 21:40

## 2024-10-20 RX ADMIN — FOLIC ACID 1 MG: 1 TABLET ORAL at 09:41

## 2024-10-20 RX ADMIN — GLIPIZIDE 10 MG: 10 TABLET ORAL at 08:10

## 2024-10-20 RX ADMIN — MORPHINE SULFATE 2 MG: 2 INJECTION, SOLUTION INTRAMUSCULAR; INTRAVENOUS at 14:37

## 2024-10-20 RX ADMIN — WARFARIN SODIUM 7.5 MG: 7.5 TABLET ORAL at 17:29

## 2024-10-20 RX ADMIN — METOCLOPRAMIDE 5 MG: 5 TABLET ORAL at 21:40

## 2024-10-20 RX ADMIN — PANTOPRAZOLE SODIUM 40 MG: 40 TABLET, DELAYED RELEASE ORAL at 05:56

## 2024-10-20 RX ADMIN — INSULIN GLARGINE 25 UNITS: 100 INJECTION, SOLUTION SUBCUTANEOUS at 09:42

## 2024-10-20 RX ADMIN — Medication 1000 UNITS: at 09:41

## 2024-10-20 RX ADMIN — SEVELAMER CARBONATE 800 MG: 800 TABLET, FILM COATED ORAL at 08:10

## 2024-10-20 RX ADMIN — TOPIRAMATE 100 MG: 100 TABLET, FILM COATED ORAL at 21:40

## 2024-10-20 RX ADMIN — HYDROCODONE BITARTRATE AND ACETAMINOPHEN 1 TABLET: 5; 325 TABLET ORAL at 12:10

## 2024-10-20 RX ADMIN — SEVELAMER CARBONATE 800 MG: 800 TABLET, FILM COATED ORAL at 17:29

## 2024-10-20 RX ADMIN — SEVELAMER CARBONATE 800 MG: 800 TABLET, FILM COATED ORAL at 12:10

## 2024-10-20 RX ADMIN — Medication 400 MG: at 09:41

## 2024-10-20 RX ADMIN — HEPARIN SODIUM 15 UNITS/KG/HR: 10000 INJECTION, SOLUTION INTRAVENOUS at 08:12

## 2024-10-20 RX ADMIN — HYDROCODONE BITARTRATE AND ACETAMINOPHEN 2 TABLET: 5; 325 TABLET ORAL at 21:43

## 2024-10-20 RX ADMIN — HYDROCODONE BITARTRATE AND ACETAMINOPHEN 1 TABLET: 5; 325 TABLET ORAL at 05:59

## 2024-10-20 RX ADMIN — Medication 400 MG: at 21:40

## 2024-10-20 RX ADMIN — NIFEDIPINE 60 MG: 30 TABLET, FILM COATED, EXTENDED RELEASE ORAL at 09:41

## 2024-10-20 RX ADMIN — SODIUM CHLORIDE, PRESERVATIVE FREE 10 ML: 5 INJECTION INTRAVENOUS at 21:40

## 2024-10-20 RX ADMIN — HYDRALAZINE HYDROCHLORIDE 100 MG: 50 TABLET ORAL at 02:09

## 2024-10-20 RX ADMIN — TOPIRAMATE 100 MG: 100 TABLET, FILM COATED ORAL at 09:41

## 2024-10-20 RX ADMIN — METOCLOPRAMIDE 5 MG: 5 TABLET ORAL at 09:42

## 2024-10-20 ASSESSMENT — PAIN DESCRIPTION - ORIENTATION
ORIENTATION: LEFT
ORIENTATION: LEFT
ORIENTATION: RIGHT;LEFT

## 2024-10-20 ASSESSMENT — PAIN SCALES - GENERAL
PAINLEVEL_OUTOF10: 7
PAINLEVEL_OUTOF10: 4
PAINLEVEL_OUTOF10: 4

## 2024-10-20 ASSESSMENT — PAIN DESCRIPTION - DESCRIPTORS
DESCRIPTORS: ACHING
DESCRIPTORS: SORE
DESCRIPTORS: SORE

## 2024-10-20 ASSESSMENT — PAIN DESCRIPTION - LOCATION
LOCATION: ARM;NECK
LOCATION: ARM;NECK
LOCATION: WRIST

## 2024-10-20 ASSESSMENT — PAIN SCALES - WONG BAKER: WONGBAKER_NUMERICALRESPONSE: HURTS LITTLE MORE

## 2024-10-21 LAB
ANION GAP SERPL CALCULATED.3IONS-SCNC: 15 MMOL/L (ref 9–17)
ANTI-XA UNFRAC HEPARIN: 0.36 IU/L (ref 0.3–0.7)
BUN SERPL-MCNC: 56 MG/DL (ref 6–20)
BUN/CREAT SERPL: 5 (ref 9–20)
CALCIUM SERPL-MCNC: 8.9 MG/DL (ref 8.6–10.4)
CHLORIDE SERPL-SCNC: 93 MMOL/L (ref 98–107)
CO2 SERPL-SCNC: 23 MMOL/L (ref 20–31)
CREAT SERPL-MCNC: 11.8 MG/DL (ref 0.7–1.2)
GFR, ESTIMATED: 5 ML/MIN/1.73M2
GLUCOSE BLD-MCNC: 130 MG/DL (ref 75–110)
GLUCOSE BLD-MCNC: 146 MG/DL (ref 75–110)
GLUCOSE BLD-MCNC: 163 MG/DL (ref 75–110)
GLUCOSE BLD-MCNC: 81 MG/DL (ref 75–110)
GLUCOSE SERPL-MCNC: 140 MG/DL (ref 70–99)
INR PPP: 1.4
MICROORGANISM SPEC CULT: NORMAL
MICROORGANISM SPEC CULT: NORMAL
POTASSIUM SERPL-SCNC: 4.5 MMOL/L (ref 3.7–5.3)
PROTHROMBIN TIME: 16.8 SEC (ref 11.5–14.2)
SERVICE CMNT-IMP: NORMAL
SERVICE CMNT-IMP: NORMAL
SODIUM SERPL-SCNC: 131 MMOL/L (ref 135–144)
SPECIMEN DESCRIPTION: NORMAL
SPECIMEN DESCRIPTION: NORMAL

## 2024-10-21 PROCEDURE — 82947 ASSAY GLUCOSE BLOOD QUANT: CPT

## 2024-10-21 PROCEDURE — 85520 HEPARIN ASSAY: CPT

## 2024-10-21 PROCEDURE — 85610 PROTHROMBIN TIME: CPT

## 2024-10-21 PROCEDURE — 6370000000 HC RX 637 (ALT 250 FOR IP): Performed by: SURGERY

## 2024-10-21 PROCEDURE — 6360000002 HC RX W HCPCS: Performed by: SURGERY

## 2024-10-21 PROCEDURE — 6370000000 HC RX 637 (ALT 250 FOR IP): Performed by: NURSE PRACTITIONER

## 2024-10-21 PROCEDURE — 36415 COLL VENOUS BLD VENIPUNCTURE: CPT

## 2024-10-21 PROCEDURE — 80048 BASIC METABOLIC PNL TOTAL CA: CPT

## 2024-10-21 PROCEDURE — 1200000000 HC SEMI PRIVATE

## 2024-10-21 RX ORDER — DOCUSATE SODIUM 100 MG/1
100 CAPSULE, LIQUID FILLED ORAL DAILY
Status: DISCONTINUED | OUTPATIENT
Start: 2024-10-21 | End: 2024-10-22 | Stop reason: HOSPADM

## 2024-10-21 RX ORDER — WARFARIN SODIUM 10 MG/1
10 TABLET ORAL
Status: COMPLETED | OUTPATIENT
Start: 2024-10-21 | End: 2024-10-21

## 2024-10-21 RX ADMIN — TRAZODONE HYDROCHLORIDE 50 MG: 50 TABLET ORAL at 19:55

## 2024-10-21 RX ADMIN — INSULIN GLARGINE 25 UNITS: 100 INJECTION, SOLUTION SUBCUTANEOUS at 09:09

## 2024-10-21 RX ADMIN — TOPIRAMATE 100 MG: 100 TABLET, FILM COATED ORAL at 09:10

## 2024-10-21 RX ADMIN — DIPHENHYDRAMINE HCL 25 MG: 25 TABLET ORAL at 07:32

## 2024-10-21 RX ADMIN — Medication 1000 UNITS: at 09:10

## 2024-10-21 RX ADMIN — Medication 400 MG: at 09:10

## 2024-10-21 RX ADMIN — DOCUSATE SODIUM 100 MG: 100 CAPSULE, LIQUID FILLED ORAL at 12:56

## 2024-10-21 RX ADMIN — HEPARIN SODIUM 15 UNITS/KG/HR: 10000 INJECTION, SOLUTION INTRAVENOUS at 00:58

## 2024-10-21 RX ADMIN — HYDROCODONE BITARTRATE AND ACETAMINOPHEN 2 TABLET: 5; 325 TABLET ORAL at 19:55

## 2024-10-21 RX ADMIN — POLYETHYLENE GLYCOL 3350 17 G: 17 POWDER, FOR SOLUTION ORAL at 15:10

## 2024-10-21 RX ADMIN — METOCLOPRAMIDE 5 MG: 5 TABLET ORAL at 19:55

## 2024-10-21 RX ADMIN — Medication 400 MG: at 19:55

## 2024-10-21 RX ADMIN — WARFARIN SODIUM 10 MG: 10 TABLET ORAL at 17:08

## 2024-10-21 RX ADMIN — NIFEDIPINE 60 MG: 30 TABLET, FILM COATED, EXTENDED RELEASE ORAL at 11:14

## 2024-10-21 RX ADMIN — TOPIRAMATE 100 MG: 100 TABLET, FILM COATED ORAL at 19:55

## 2024-10-21 RX ADMIN — ROSUVASTATIN CALCIUM 5 MG: 5 TABLET, FILM COATED ORAL at 09:10

## 2024-10-21 RX ADMIN — SEVELAMER CARBONATE 800 MG: 800 TABLET, FILM COATED ORAL at 16:59

## 2024-10-21 RX ADMIN — METOCLOPRAMIDE 5 MG: 5 TABLET ORAL at 17:08

## 2024-10-21 RX ADMIN — GLIPIZIDE 10 MG: 10 TABLET ORAL at 09:09

## 2024-10-21 RX ADMIN — HYDROCODONE BITARTRATE AND ACETAMINOPHEN 2 TABLET: 5; 325 TABLET ORAL at 04:52

## 2024-10-21 RX ADMIN — FOLIC ACID 1 MG: 1 TABLET ORAL at 09:10

## 2024-10-21 RX ADMIN — SEVELAMER CARBONATE 800 MG: 800 TABLET, FILM COATED ORAL at 12:56

## 2024-10-21 RX ADMIN — HEPARIN SODIUM 15 UNITS/KG/HR: 10000 INJECTION, SOLUTION INTRAVENOUS at 17:54

## 2024-10-21 RX ADMIN — HYDRALAZINE HYDROCHLORIDE 100 MG: 50 TABLET ORAL at 11:15

## 2024-10-21 RX ADMIN — METOCLOPRAMIDE 5 MG: 5 TABLET ORAL at 12:56

## 2024-10-21 RX ADMIN — METOCLOPRAMIDE 5 MG: 5 TABLET ORAL at 09:10

## 2024-10-21 RX ADMIN — SEVELAMER CARBONATE 800 MG: 800 TABLET, FILM COATED ORAL at 09:10

## 2024-10-21 RX ADMIN — PANTOPRAZOLE SODIUM 40 MG: 40 TABLET, DELAYED RELEASE ORAL at 04:52

## 2024-10-21 RX ADMIN — HYDRALAZINE HYDROCHLORIDE 100 MG: 50 TABLET ORAL at 17:08

## 2024-10-21 ASSESSMENT — PAIN SCALES - GENERAL
PAINLEVEL_OUTOF10: 8
PAINLEVEL_OUTOF10: 7

## 2024-10-21 ASSESSMENT — PAIN DESCRIPTION - ORIENTATION
ORIENTATION: LEFT
ORIENTATION: LEFT

## 2024-10-21 ASSESSMENT — PAIN DESCRIPTION - DESCRIPTORS
DESCRIPTORS: ACHING
DESCRIPTORS: ACHING

## 2024-10-21 ASSESSMENT — PAIN DESCRIPTION - LOCATION
LOCATION: WRIST
LOCATION: WRIST

## 2024-10-21 NOTE — CARE COORDINATION
DC Planning    Spoke with pt, he is independent, drives self to [facility].  Lives w wife, declines dc needs. Goes to [facility] for pcp.

## 2024-10-22 VITALS
WEIGHT: 218.92 LBS | DIASTOLIC BLOOD PRESSURE: 63 MMHG | OXYGEN SATURATION: 97 % | TEMPERATURE: 97.7 F | HEART RATE: 74 BPM | RESPIRATION RATE: 18 BRPM | SYSTOLIC BLOOD PRESSURE: 113 MMHG | BODY MASS INDEX: 32.42 KG/M2 | HEIGHT: 69 IN

## 2024-10-22 LAB
ANION GAP SERPL CALCULATED.3IONS-SCNC: 15 MMOL/L (ref 9–17)
ANTI-XA UNFRAC HEPARIN: 0.43 IU/L (ref 0.3–0.7)
BUN SERPL-MCNC: 59 MG/DL (ref 6–20)
BUN/CREAT SERPL: 5 (ref 9–20)
CALCIUM SERPL-MCNC: 9.1 MG/DL (ref 8.6–10.4)
CHLORIDE SERPL-SCNC: 95 MMOL/L (ref 98–107)
CO2 SERPL-SCNC: 24 MMOL/L (ref 20–31)
CREAT SERPL-MCNC: 12.2 MG/DL (ref 0.7–1.2)
ERYTHROCYTE [DISTWIDTH] IN BLOOD BY AUTOMATED COUNT: 13.3 % (ref 11.8–14.4)
GFR, ESTIMATED: 4 ML/MIN/1.73M2
GLUCOSE BLD-MCNC: 77 MG/DL (ref 75–110)
GLUCOSE BLD-MCNC: 92 MG/DL (ref 75–110)
GLUCOSE SERPL-MCNC: 76 MG/DL (ref 70–99)
HCT VFR BLD AUTO: 28.1 % (ref 40.7–50.3)
HGB BLD-MCNC: 9.4 G/DL (ref 13–17)
INR PPP: 1.5
MCH RBC QN AUTO: 30.8 PG (ref 25.2–33.5)
MCHC RBC AUTO-ENTMCNC: 33.5 G/DL (ref 28.4–34.8)
MCV RBC AUTO: 92.1 FL (ref 82.6–102.9)
NRBC BLD-RTO: 0 PER 100 WBC
PLATELET # BLD AUTO: 161 K/UL (ref 138–453)
PMV BLD AUTO: 9 FL (ref 8.1–13.5)
POTASSIUM SERPL-SCNC: 4.5 MMOL/L (ref 3.7–5.3)
PROTHROMBIN TIME: 18.5 SEC (ref 11.5–14.2)
RBC # BLD AUTO: 3.05 M/UL (ref 4.21–5.77)
SODIUM SERPL-SCNC: 134 MMOL/L (ref 135–144)
WBC OTHER # BLD: 6.1 K/UL (ref 3.5–11.3)

## 2024-10-22 PROCEDURE — 6370000000 HC RX 637 (ALT 250 FOR IP): Performed by: INTERNAL MEDICINE

## 2024-10-22 PROCEDURE — 6370000000 HC RX 637 (ALT 250 FOR IP): Performed by: SURGERY

## 2024-10-22 PROCEDURE — 85610 PROTHROMBIN TIME: CPT

## 2024-10-22 PROCEDURE — 85520 HEPARIN ASSAY: CPT

## 2024-10-22 PROCEDURE — 85027 COMPLETE CBC AUTOMATED: CPT

## 2024-10-22 PROCEDURE — 6360000002 HC RX W HCPCS: Performed by: SURGERY

## 2024-10-22 PROCEDURE — 82947 ASSAY GLUCOSE BLOOD QUANT: CPT

## 2024-10-22 PROCEDURE — 90935 HEMODIALYSIS ONE EVALUATION: CPT

## 2024-10-22 PROCEDURE — 36415 COLL VENOUS BLD VENIPUNCTURE: CPT

## 2024-10-22 PROCEDURE — 6370000000 HC RX 637 (ALT 250 FOR IP): Performed by: NURSE PRACTITIONER

## 2024-10-22 PROCEDURE — 80048 BASIC METABOLIC PNL TOTAL CA: CPT

## 2024-10-22 RX ORDER — HYDRALAZINE HYDROCHLORIDE 50 MG/1
50 TABLET, FILM COATED ORAL 2 TIMES DAILY
Qty: 90 TABLET | Refills: 3 | Status: SHIPPED | OUTPATIENT
Start: 2024-10-22 | End: 2024-10-22

## 2024-10-22 RX ORDER — HYDROCODONE BITARTRATE AND ACETAMINOPHEN 5; 325 MG/1; MG/1
1 TABLET ORAL EVERY 4 HOURS PRN
Qty: 12 TABLET | Refills: 0 | Status: SHIPPED | OUTPATIENT
Start: 2024-10-22 | End: 2024-10-25

## 2024-10-22 RX ORDER — HYDRALAZINE HYDROCHLORIDE 50 MG/1
50 TABLET, FILM COATED ORAL EVERY 8 HOURS
Status: DISCONTINUED | OUTPATIENT
Start: 2024-10-22 | End: 2024-10-22 | Stop reason: HOSPADM

## 2024-10-22 RX ORDER — HYDRALAZINE HYDROCHLORIDE 50 MG/1
50 TABLET, FILM COATED ORAL 3 TIMES DAILY
Qty: 90 TABLET | Refills: 3 | Status: SHIPPED | OUTPATIENT
Start: 2024-10-22

## 2024-10-22 RX ORDER — WARFARIN SODIUM 10 MG/1
10 TABLET ORAL
Status: COMPLETED | OUTPATIENT
Start: 2024-10-22 | End: 2024-10-22

## 2024-10-22 RX ADMIN — SEVELAMER CARBONATE 800 MG: 800 TABLET, FILM COATED ORAL at 16:19

## 2024-10-22 RX ADMIN — DOCUSATE SODIUM 100 MG: 100 CAPSULE, LIQUID FILLED ORAL at 08:21

## 2024-10-22 RX ADMIN — Medication 400 MG: at 08:21

## 2024-10-22 RX ADMIN — HYDROCODONE BITARTRATE AND ACETAMINOPHEN 2 TABLET: 5; 325 TABLET ORAL at 05:14

## 2024-10-22 RX ADMIN — FOLIC ACID 1 MG: 1 TABLET ORAL at 08:21

## 2024-10-22 RX ADMIN — TOPIRAMATE 100 MG: 100 TABLET, FILM COATED ORAL at 08:21

## 2024-10-22 RX ADMIN — WARFARIN SODIUM 10 MG: 10 TABLET ORAL at 16:19

## 2024-10-22 RX ADMIN — SEVELAMER CARBONATE 800 MG: 800 TABLET, FILM COATED ORAL at 08:26

## 2024-10-22 RX ADMIN — METOCLOPRAMIDE 5 MG: 5 TABLET ORAL at 08:21

## 2024-10-22 RX ADMIN — PANTOPRAZOLE SODIUM 40 MG: 40 TABLET, DELAYED RELEASE ORAL at 05:14

## 2024-10-22 RX ADMIN — HEPARIN SODIUM 15 UNITS/KG/HR: 10000 INJECTION, SOLUTION INTRAVENOUS at 10:45

## 2024-10-22 RX ADMIN — HYDRALAZINE HYDROCHLORIDE 50 MG: 50 TABLET ORAL at 16:19

## 2024-10-22 RX ADMIN — ROSUVASTATIN CALCIUM 5 MG: 5 TABLET, FILM COATED ORAL at 08:21

## 2024-10-22 RX ADMIN — METOCLOPRAMIDE 5 MG: 5 TABLET ORAL at 16:19

## 2024-10-22 RX ADMIN — Medication 1000 UNITS: at 08:26

## 2024-10-22 RX ADMIN — POLYETHYLENE GLYCOL 3350 17 G: 17 POWDER, FOR SOLUTION ORAL at 05:18

## 2024-10-22 ASSESSMENT — PAIN DESCRIPTION - LOCATION: LOCATION: WRIST

## 2024-10-22 ASSESSMENT — PAIN DESCRIPTION - ORIENTATION: ORIENTATION: LEFT

## 2024-10-22 ASSESSMENT — PAIN SCALES - GENERAL: PAINLEVEL_OUTOF10: 7

## 2024-10-22 ASSESSMENT — PAIN DESCRIPTION - DESCRIPTORS: DESCRIPTORS: ACHING

## 2024-10-22 ASSESSMENT — PAIN - FUNCTIONAL ASSESSMENT: PAIN_FUNCTIONAL_ASSESSMENT: ACTIVITIES ARE NOT PREVENTED

## 2024-10-22 NOTE — CARE COORDINATION
DC Planning    Possible dc today, pt drove self here, follows [facility] and they manage his coumadin and plans to got there tomorrow if dc today.

## 2024-10-22 NOTE — PLAN OF CARE
Problem: Chronic Conditions and Co-morbidities  Goal: Patient's chronic conditions and co-morbidity symptoms are monitored and maintained or improved  10/18/2024 0517 by Juancho Kowalski RN  Outcome: Progressing  Flowsheets (Taken 10/17/2024 1945)  Care Plan - Patient's Chronic Conditions and Co-Morbidity Symptoms are Monitored and Maintained or Improved:   Monitor and assess patient's chronic conditions and comorbid symptoms for stability, deterioration, or improvement   Collaborate with multidisciplinary team to address chronic and comorbid conditions and prevent exacerbation or deterioration   Update acute care plan with appropriate goals if chronic or comorbid symptoms are exacerbated and prevent overall improvement and discharge     Problem: Discharge Planning  Goal: Discharge to home or other facility with appropriate resources  10/18/2024 0517 by Juancho Kowalski RN  Outcome: Progressing  Flowsheets (Taken 10/17/2024 1945)  Discharge to home or other facility with appropriate resources:   Identify barriers to discharge with patient and caregiver   Arrange for needed discharge resources and transportation as appropriate   Identify discharge learning needs (meds, wound care, etc)   Refer to discharge planning if patient needs post-hospital services based on physician order or complex needs related to functional status, cognitive ability or social support system     Problem: Gastrointestinal - Adult  Goal: Minimal or absence of nausea and vomiting  10/18/2024 0517 by Juancho Kowalski RN  Outcome: Progressing  Flowsheets (Taken 10/17/2024 1945)  Minimal or absence of nausea and vomiting:   Administer IV fluids as ordered to ensure adequate hydration   Administer ordered antiemetic medications as needed   Provide nonpharmacologic comfort measures as appropriate     Problem: Gastrointestinal - Adult  Goal: Maintains adequate nutritional intake  10/18/2024 0517 by Juancho Kowalski, LOLY  Outcome: 
  Problem: Chronic Conditions and Co-morbidities  Goal: Patient's chronic conditions and co-morbidity symptoms are monitored and maintained or improved  10/19/2024 0412 by Juancho Kowalski RN  Outcome: Progressing  Flowsheets (Taken 10/18/2024 2100)  Care Plan - Patient's Chronic Conditions and Co-Morbidity Symptoms are Monitored and Maintained or Improved:   Monitor and assess patient's chronic conditions and comorbid symptoms for stability, deterioration, or improvement   Collaborate with multidisciplinary team to address chronic and comorbid conditions and prevent exacerbation or deterioration   Update acute care plan with appropriate goals if chronic or comorbid symptoms are exacerbated and prevent overall improvement and discharge     Problem: Discharge Planning  Goal: Discharge to home or other facility with appropriate resources  10/19/2024 0412 by Juancho Kowalski RN  Outcome: Progressing  Flowsheets (Taken 10/18/2024 2100)  Discharge to home or other facility with appropriate resources:   Identify barriers to discharge with patient and caregiver   Arrange for needed discharge resources and transportation as appropriate   Identify discharge learning needs (meds, wound care, etc)   Refer to discharge planning if patient needs post-hospital services based on physician order or complex needs related to functional status, cognitive ability or social support system     Problem: Pain  Goal: Verbalizes/displays adequate comfort level or baseline comfort level  10/19/2024 0412 by Juancho Kowalski RN  Outcome: Progressing  Flowsheets (Taken 10/18/2024 2228)  Verbalizes/displays adequate comfort level or baseline comfort level:   Encourage patient to monitor pain and request assistance   Assess pain using appropriate pain scale   Administer analgesics based on type and severity of pain and evaluate response   Implement non-pharmacological measures as appropriate and evaluate response   Consider cultural and 
  Problem: Chronic Conditions and Co-morbidities  Goal: Patient's chronic conditions and co-morbidity symptoms are monitored and maintained or improved  10/22/2024 1739 by Hannah Patel RN  Outcome: Completed  10/22/2024 1148 by Hannah Patel RN  Outcome: Progressing  Flowsheets (Taken 10/22/2024 0827)  Care Plan - Patient's Chronic Conditions and Co-Morbidity Symptoms are Monitored and Maintained or Improved:   Monitor and assess patient's chronic conditions and comorbid symptoms for stability, deterioration, or improvement   Collaborate with multidisciplinary team to address chronic and comorbid conditions and prevent exacerbation or deterioration   Update acute care plan with appropriate goals if chronic or comorbid symptoms are exacerbated and prevent overall improvement and discharge     Problem: Discharge Planning  Goal: Discharge to home or other facility with appropriate resources  10/22/2024 1739 by Hannah Patel RN  Outcome: Completed  10/22/2024 1148 by Hannah Patel RN  Outcome: Progressing  Flowsheets (Taken 10/22/2024 0827)  Discharge to home or other facility with appropriate resources:   Arrange for needed discharge resources and transportation as appropriate   Identify barriers to discharge with patient and caregiver   Identify discharge learning needs (meds, wound care, etc)   Arrange for interpreters to assist at discharge as needed   Refer to discharge planning if patient needs post-hospital services based on physician order or complex needs related to functional status, cognitive ability or social support system     Problem: Pain  Goal: Verbalizes/displays adequate comfort level or baseline comfort level  10/22/2024 1739 by Hannah Patel RN  Outcome: Completed  10/22/2024 1148 by Hannah Patel RN  Outcome: Progressing  Flowsheets (Taken 10/22/2024 0720)  Verbalizes/displays adequate comfort level or baseline comfort level:   Encourage patient to monitor pain and request assistance   Assess pain 
  Problem: Chronic Conditions and Co-morbidities  Goal: Patient's chronic conditions and co-morbidity symptoms are monitored and maintained or improved  Outcome: Progressing     Problem: Discharge Planning  Goal: Discharge to home or other facility with appropriate resources  Outcome: Progressing     Problem: Pain  Goal: Verbalizes/displays adequate comfort level or baseline comfort level  Outcome: Progressing     Problem: ABCDS Injury Assessment  Goal: Absence of physical injury  Outcome: Progressing     Problem: Gastrointestinal - Adult  Goal: Minimal or absence of nausea and vomiting  Outcome: Progressing  Goal: Maintains adequate nutritional intake  Outcome: Progressing     Problem: Metabolic/Fluid and Electrolytes - Adult  Goal: Electrolytes maintained within normal limits  Outcome: Progressing  Goal: Hemodynamic stability and optimal renal function maintained  Outcome: Progressing     Problem: Genitourinary - Adult  Goal: Absence of urinary retention  Outcome: Progressing     Problem: Skin/Tissue Integrity - Adult  Goal: Skin integrity remains intact  Outcome: Progressing     Problem: Safety - Adult  Goal: Free from fall injury  Outcome: Progressing     
  Problem: Chronic Conditions and Co-morbidities  Goal: Patient's chronic conditions and co-morbidity symptoms are monitored and maintained or improved  Outcome: Progressing  Flowsheets (Taken 10/16/2024 0925)  Care Plan - Patient's Chronic Conditions and Co-Morbidity Symptoms are Monitored and Maintained or Improved:   Monitor and assess patient's chronic conditions and comorbid symptoms for stability, deterioration, or improvement   Collaborate with multidisciplinary team to address chronic and comorbid conditions and prevent exacerbation or deterioration   Update acute care plan with appropriate goals if chronic or comorbid symptoms are exacerbated and prevent overall improvement and discharge     Problem: Discharge Planning  Goal: Discharge to home or other facility with appropriate resources  Outcome: Progressing  Flowsheets  Taken 10/16/2024 1534  Discharge to home or other facility with appropriate resources:   Identify barriers to discharge with patient and caregiver   Arrange for needed discharge resources and transportation as appropriate   Identify discharge learning needs (meds, wound care, etc)   Refer to discharge planning if patient needs post-hospital services based on physician order or complex needs related to functional status, cognitive ability or social support system  Taken 10/16/2024 0925  Discharge to home or other facility with appropriate resources:   Identify barriers to discharge with patient and caregiver   Arrange for needed discharge resources and transportation as appropriate   Identify discharge learning needs (meds, wound care, etc)   Refer to discharge planning if patient needs post-hospital services based on physician order or complex needs related to functional status, cognitive ability or social support system     Problem: Pain  Goal: Verbalizes/displays adequate comfort level or baseline comfort level  Outcome: Progressing  Flowsheets (Taken 10/16/2024 
  Problem: Pain  Goal: Verbalizes/displays adequate comfort level or baseline comfort level  10/19/2024 1102 by Ramon Quinones, RN  Outcome: Progressing  Flowsheets (Taken 10/19/2024 1102)  Verbalizes/displays adequate comfort level or baseline comfort level:   Encourage patient to monitor pain and request assistance   Assess pain using appropriate pain scale   Administer analgesics based on type and severity of pain and evaluate response  Note: Patient s/p fistula gram. Patient c/o pain to AVF site and Right IJ site. Patient taking Morphine prn [place] prn was ordered. Patient states pain medication iseffective for pain control  10/19/2024 0412 by Juancho Kowalski, RN  Outcome: Progressing  Flowsheets (Taken 10/18/2024 2228)  Verbalizes/displays adequate comfort level or baseline comfort level:   Encourage patient to monitor pain and request assistance   Assess pain using appropriate pain scale   Administer analgesics based on type and severity of pain and evaluate response   Implement non-pharmacological measures as appropriate and evaluate response   Consider cultural and social influences on pain and pain management   Notify Licensed Independent Practitioner if interventions unsuccessful or patient reports new pain     
  Problem: Pain  Goal: Verbalizes/displays adequate comfort level or baseline comfort level  10/19/2024 2349 by Maki Cordoba RN  Outcome: Progressing  Flowsheets (Taken 10/19/2024 2349)  Verbalizes/displays adequate comfort level or baseline comfort level:   Encourage patient to monitor pain and request assistance   Assess pain using appropriate pain scale   Administer analgesics based on type and severity of pain and evaluate response   Implement non-pharmacological measures as appropriate and evaluate response   Consider cultural and social influences on pain and pain management   Notify Licensed Independent Practitioner if interventions unsuccessful or patient reports new pain     
  Problem: Pain  Goal: Verbalizes/displays adequate comfort level or baseline comfort level  10/20/2024 1216 by Ramon Quinones RN  Outcome: Progressing  Flowsheets (Taken 10/20/2024 1216)  Verbalizes/displays adequate comfort level or baseline comfort level:   Assess pain using appropriate pain scale   Encourage patient to monitor pain and request assistance   Administer analgesics based on type and severity of pain and evaluate response   Implement non-pharmacological measures as appropriate and evaluate response  Note: Patient admitted with abd pain and nausea. Patient denies abd pain c/o pain to LAVF and right IJ site. Patient taking Norco/ Morphine prn for pain control. Patient states medication is effective for pain control  10/19/2024 2349 by Maki Cordoba RN  Outcome: Progressing  Flowsheets (Taken 10/19/2024 2349)  Verbalizes/displays adequate comfort level or baseline comfort level:   Encourage patient to monitor pain and request assistance   Assess pain using appropriate pain scale   Administer analgesics based on type and severity of pain and evaluate response   Implement non-pharmacological measures as appropriate and evaluate response   Consider cultural and social influences on pain and pain management   Notify Licensed Independent Practitioner if interventions unsuccessful or patient reports new pain     
  Problem: Pain  Goal: Verbalizes/displays adequate comfort level or baseline comfort level  10/20/2024 2252 by Maki Cordoba RN  Outcome: Progressing  Flowsheets (Taken 10/20/2024 2252)  Verbalizes/displays adequate comfort level or baseline comfort level:   Encourage patient to monitor pain and request assistance   Assess pain using appropriate pain scale   Administer analgesics based on type and severity of pain and evaluate response   Implement non-pharmacological measures as appropriate and evaluate response   Consider cultural and social influences on pain and pain management   Notify Licensed Independent Practitioner if interventions unsuccessful or patient reports new pain     
  Problem: Pain  Goal: Verbalizes/displays adequate comfort level or baseline comfort level  10/21/2024 2236 by Maki Cordoba RN  Outcome: Progressing  Flowsheets (Taken 10/21/2024 2236)  Verbalizes/displays adequate comfort level or baseline comfort level:   Encourage patient to monitor pain and request assistance   Assess pain using appropriate pain scale   Administer analgesics based on type and severity of pain and evaluate response   Implement non-pharmacological measures as appropriate and evaluate response   Consider cultural and social influences on pain and pain management   Notify Licensed Independent Practitioner if interventions unsuccessful or patient reports new pain     
[facility]  Office: [phone]  Tray Ornelas DO, Lloyd Gamez DO, Robbie Almanzar DO, Amaury Mares DO, Brissa Iglesias MD, Deirdre Cross MD, Quincy Oliva MD, Simran Marshall MD,  Dex Mckeon MD, Barrington Collins MD, Trevon Manuel MD,  Hellen Vides DO, Rhonda Alva MD, Mario Watson MD, Kermit Ornelas DO, Keiko Mackenzie MD,  Rojelio Roland DO, Annelise Richey MD, Claudia Glover MD, Leanna Ace MD, Tiara Carvalho MD,  Eugenio Null MD, Da Sun MD, Mayra Ridley MD, Rafita Sanders MD, Dakotah Calero MD, Eleanor López MD, Tre Moreland DO, Rufus Sky MD, Shirley Waterhouse, CNP,  Allegra Al CNP, Tre Julien, CNP,  Blessing High, RAZIA, Natasha Harris, CNP, Sherrill Pineda, CNP, Yessica Rubio, CNP, Estrellita Valentin, CNP, Sonal Self PA-C, Lesia Yoder PA-C, Dinora Lloyd, CNP, Cassie Sharpe, CNP,  Teresa Rangel, CNP, Elissa Pagan, CNP,  Lyndsay Light, CNP, Reyna Rose, CNP         [facility]   IN-PATIENT SERVICE   [facility]    Second Visit Note  For more detailed information please refer to the progress note of the day      10/21/2024    8:24 AM    Name:   Hayes Brock  MRN:     [id number]     Acct:      [id number]   Room:   2019/2019-02  IP Day:  5  Admit Date:  10/16/2024  6:11 AM    PCP:   Misael Gibson MD  Code Status:  Full Code      Pt vitals were reviewed   New labs were reviewed   Patient was seen    Called to the bedside to evaluate patient after clot removal and fistula.  Patient has palpable pulses in the distal fistula however fistula thrill has become absent.  This reportedly happened yesterday and a superficial clot was expressed from likely hematoma from site incision.  It is presumed again that the superficial cavity has become fluid-filled restricting flow.  Case was discussed with vascular surgeon and he expressed his assessment yesterday was consistent with superficial thrombus and was likely not 
Pt doing well. Successful fistulogram this afternoon. Okay to use fistula on Tuesday. Plan to use temp cath tomorrow for dialysis and then potential D/C after. Started heparin gtt to bridge with coumadin until therapeutic. Patient has nerve block left axillary and has sling for left arm as it is numb and patient has limited control of it.  Problem: Chronic Conditions and Co-morbidities  Goal: Patient's chronic conditions and co-morbidity symptoms are monitored and maintained or improved  Outcome: Progressing     Problem: Discharge Planning  Goal: Discharge to home or other facility with appropriate resources  Outcome: Progressing     Problem: Pain  Goal: Verbalizes/displays adequate comfort level or baseline comfort level  Outcome: Progressing     Problem: ABCDS Injury Assessment  Goal: Absence of physical injury  Outcome: Progressing     Problem: Gastrointestinal - Adult  Goal: Minimal or absence of nausea and vomiting  Outcome: Progressing  Goal: Maintains adequate nutritional intake  Outcome: Progressing     Problem: Metabolic/Fluid and Electrolytes - Adult  Goal: Electrolytes maintained within normal limits  Outcome: Progressing  Goal: Hemodynamic stability and optimal renal function maintained  Outcome: Progressing     Problem: Genitourinary - Adult  Goal: Absence of urinary retention  Outcome: Progressing     Problem: Skin/Tissue Integrity - Adult  Goal: Skin integrity remains intact  Outcome: Progressing     
Pt independent, remains free from injury.  Tolerating diet, no c/o nausea.      Problem: Chronic Conditions and Co-morbidities  Goal: Patient's chronic conditions and co-morbidity symptoms are monitored and maintained or improved  10/17/2024 0430 by Misa Stiles RN  Outcome: Progressing     Problem: Discharge Planning  Goal: Discharge to home or other facility with appropriate resources  10/17/2024 0430 by Misa Stiles RN  Outcome: Progressing     Problem: Pain  Goal: Verbalizes/displays adequate comfort level or baseline comfort level  10/17/2024 0430 by Misa Stiles RN  Outcome: Progressing     Problem: ABCDS Injury Assessment  Goal: Absence of physical injury  10/17/2024 0430 by Misa Stiles RN  Outcome: Progressing     Problem: Gastrointestinal - Adult  Goal: Minimal or absence of nausea and vomiting  10/17/2024 0430 by Misa Stiles RN  Outcome: Progressing     Problem: Gastrointestinal - Adult  Goal: Maintains adequate nutritional intake  10/17/2024 0430 by Misa Stiles RN  Outcome: Progressing     Problem: Metabolic/Fluid and Electrolytes - Adult  Goal: Electrolytes maintained within normal limits  10/17/2024 0430 by Misa Stiles RN  Outcome: Progressing     Problem: Metabolic/Fluid and Electrolytes - Adult  Goal: Hemodynamic stability and optimal renal function maintained  10/17/2024 0430 by Misa Stiles RN  Outcome: Progressing     Problem: Genitourinary - Adult  Goal: Absence of urinary retention  10/17/2024 0430 by Misa Stiles RN  Outcome: Progressing     
hello. the patient is admitted to [facility] for Gastroporesis-that has resolved but while he was here his fustula clotted off. Our IR placed a temp cath 10/17 and he recieved dialysis. Our IR physican said she would not do a fistlulagram because he has had one previous. per chart review you saw him is January.     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 Vascular was consulted. He underwent IR fistulogram. Fistula is okay for use. He had venoplasty of L brachiocephalic vein and R brachiocephalic vein.    we could keep him until tomorrow to do his dialysis with his temp cath but he is refusing a perm cath because of all the complications he has had in the past. His next dialysis would be Tuesday and would need something done prior. Is there any was he could be scheduled for an outpatient fisutlagram either Monday or Tuesday with you?  
LOLY Bynum  Outcome: Progressing     Problem: Metabolic/Fluid and Electrolytes - Adult  Goal: Electrolytes maintained within normal limits  10/17/2024 1536 by Reece Thompson RN  Outcome: Progressing  Flowsheets (Taken 10/17/2024 0800)  Electrolytes maintained within normal limits: Monitor labs and assess patient for signs and symptoms of electrolyte imbalances  10/17/2024 0430 by Misa Stiles RN  Outcome: Progressing  Goal: Hemodynamic stability and optimal renal function maintained  10/17/2024 1536 by Reece Thompson RN  Outcome: Progressing  Flowsheets (Taken 10/17/2024 0800)  Hemodynamic stability and optimal renal function maintained: Monitor labs and assess for signs and symptoms of volume excess or deficit  10/17/2024 0430 by Misa Stiles RN  Outcome: Progressing     Problem: Genitourinary - Adult  Goal: Absence of urinary retention  10/17/2024 1536 by Reece Thompson RN  Outcome: Progressing  Flowsheets (Taken 10/17/2024 0800)  Absence of urinary retention: Assess patient’s ability to void and empty bladder  10/17/2024 0430 by Misa Stiles RN  Outcome: Progressing     
interventions unsuccessful or patient reports new pain     Problem: ABCDS Injury Assessment  Goal: Absence of physical injury  Outcome: Progressing  Flowsheets (Taken 10/22/2024 1148)  Absence of Physical Injury: Implement safety measures based on patient assessment     Problem: Gastrointestinal - Adult  Goal: Minimal or absence of nausea and vomiting  Outcome: Progressing  Flowsheets (Taken 10/22/2024 0827)  Minimal or absence of nausea and vomiting: Advance diet as tolerated, if ordered     Problem: Gastrointestinal - Adult  Goal: Maintains adequate nutritional intake  Outcome: Progressing  Flowsheets (Taken 10/22/2024 0827)  Maintains adequate nutritional intake:   Monitor percentage of each meal consumed   Monitor intake and output, weight and lab values     Problem: Metabolic/Fluid and Electrolytes - Adult  Goal: Electrolytes maintained within normal limits  Outcome: Progressing  Flowsheets (Taken 10/22/2024 0827)  Electrolytes maintained within normal limits:   Monitor labs and assess patient for signs and symptoms of electrolyte imbalances   Monitor response to electrolyte replacements, including repeat lab results as appropriate   Administer electrolyte replacement as ordered     Problem: Metabolic/Fluid and Electrolytes - Adult  Goal: Hemodynamic stability and optimal renal function maintained  Outcome: Progressing  Flowsheets (Taken 10/22/2024 0827)  Hemodynamic stability and optimal renal function maintained:   Monitor labs and assess for signs and symptoms of volume excess or deficit   Monitor intake, output and patient weight   Monitor response to interventions for patient's volume status, including labs, urine output, blood pressure (other measures as available)   Encourage oral intake as appropriate     Problem: Genitourinary - Adult  Goal: Absence of urinary retention  Outcome: Progressing  Flowsheets (Taken 10/22/2024 0827)  Absence of urinary retention:   Assess patient’s ability to void and empty

## 2024-10-22 NOTE — DISCHARGE INSTR - ACTIVITY
Take all medications as prescribed  Continue on a renal and diabetic diet   Follow with physicians as requested  Continue dialysis treatments as previously scheduled

## 2024-10-22 NOTE — FLOWSHEET NOTE
10/22/24 1439   Mobility   Location Change/Back on Floor Yes  (returned from dialysis)   Transport Method Bed

## 2024-10-22 NOTE — DIALYSIS
HEMODIALYSIS PRE-TREATMENT NOTE    Patient Identifiers prior to treatment: Name, , MRN    Isolation Required: None                      Isolation Type: na       (please document if patient is being managed as a PUI/COVID-19 patient)        Hepatitis status:                           Date Drawn                             Result  Hepatitis B Surface Antigen 3/7/24     Neg                     Hepatitis B Surface Antibody 24 96        Hepatitis B Core Antibody deidra na          How was Hepatitis Status verified: Dialysis unit records     Was a copy of the labs you documented provided to facility for the patient's chart: yes    Hemodialysis orders verified: Dr. Olivares [id number]    Access Within normal limits ( I.e. s/s of infection,...): Using AVF for first time after fistulogram. Lower part of AVF has a hardened knot. Dr. Olivares in to assess AVF. Asked staff to access AVF even though flow does not sound consistent. No redness noted. +thrill and +bruit.  Patient still has a non tunnel cathter in right neck. Catheter to be removed if AVF runs well today. If AVF does not run well, patient will need to get permanent catheter per Dr. Olivares.      Pre-Assessment completed: yes    Pre-dialysis report received from: Hannah Patel                      Time: 1722

## 2024-10-22 NOTE — PROGRESS NOTES
VASCULAR SURGERY  PROGRESS NOTE      10/21/2024 6:37 PM  Subjective:   Admit Date: 10/16/2024  PCP: Misael Gibson MD    Chief Complaint   Patient presents with    Abdominal Pain     Was seen here yesterday for same s/sx. Has not had dialysis since Saturday      Interval History: No complaints.  Good thrill at left arm AV fistula site.    Diet: ADULT DIET; Regular; 4 carb choices (60 gm/meal); Low Potassium (Less than 3000 mg/day)    Medications:   Scheduled Meds:   docusate sodium  100 mg Oral Daily    heparin (porcine)  4,000 Units IntraVENous Once    warfarin placeholder: dosing by pharmacy   Other RX Placeholder    hydrALAZINE  100 mg Oral Q8H    NIFEdipine  60 mg Oral Daily    folic acid  1 mg Oral Daily    magnesium oxide  400 mg Oral BID    sevelamer  800 mg Oral TID WC    Vitamin D  1,000 Units Oral Daily    traZODone  50 mg Oral Nightly    glipiZIDE  10 mg Oral QAM AC    insulin glargine  25 Units SubCUTAneous QAM    metoclopramide  5 mg Oral 4x Daily    pantoprazole  40 mg Oral QAM AC    rosuvastatin  5 mg Oral Daily    sodium chloride flush  5-40 mL IntraVENous 2 times per day    topiramate  100 mg Oral BID     Continuous Infusions:   heparin (PORCINE) Infusion 15 Units/kg/hr (10/21/24 1754)    sodium chloride      dextrose           Labs:   CBC:   Recent Labs     10/20/24  0341 10/20/24  1503   WBC 6.0  --    HGB 9.9* 9.9*     --      BMP:    Recent Labs     10/19/24  0602 10/21/24  0754   * 131*   K 4.3 4.5   CL 98 93*   CO2 21 23   BUN 57* 56*   CREATININE 10.0* 11.8*   GLUCOSE 95 140*     Hepatic: No results for input(s): \"AST\", \"ALT\", \"BILITOT\", \"ALKPHOS\" in the last 72 hours.    Invalid input(s): \"ALB\"  Troponin: Invalid input(s): \"TROPONIN\"  BNP: No results for input(s): \"BNP\" in the last 72 hours.  Lipids: No results for input(s): \"CHOL\", \"HDL\" in the last 72 hours.    Invalid input(s): \"LDLCALCU\"  INR:   Recent Labs     10/19/24  0602 10/20/24  0341 10/21/24  0754   INR 
        VASCULAR SURGERY  PROGRESS NOTE      10/22/2024 1:10 PM  Subjective:   Admit Date: 10/16/2024  PCP: Misael Gibson MD    Chief Complaint   Patient presents with    Abdominal Pain     Was seen here yesterday for same s/sx. Has not had dialysis since Saturday      Interval History: No complaints.  Currently being dialyzed through his AV fistula which is functioning well.    Diet: ADULT DIET; Regular; 4 carb choices (60 gm/meal); Low Potassium (Less than 3000 mg/day)    Medications:   Scheduled Meds:   hydrALAZINE  50 mg Oral Q8H    warfarin  10 mg Oral Once    docusate sodium  100 mg Oral Daily    heparin (porcine)  4,000 Units IntraVENous Once    warfarin placeholder: dosing by pharmacy   Other RX Placeholder    NIFEdipine  60 mg Oral Daily    folic acid  1 mg Oral Daily    magnesium oxide  400 mg Oral BID    sevelamer  800 mg Oral TID WC    Vitamin D  1,000 Units Oral Daily    traZODone  50 mg Oral Nightly    glipiZIDE  10 mg Oral QAM AC    insulin glargine  25 Units SubCUTAneous QAM    metoclopramide  5 mg Oral 4x Daily    pantoprazole  40 mg Oral QAM AC    rosuvastatin  5 mg Oral Daily    sodium chloride flush  5-40 mL IntraVENous 2 times per day    topiramate  100 mg Oral BID     Continuous Infusions:   heparin (PORCINE) Infusion 15 Units/kg/hr (10/22/24 1045)    sodium chloride      dextrose           Labs:   CBC:   Recent Labs     10/20/24  0341 10/20/24  1503 10/22/24  0627   WBC 6.0  --  6.1   HGB 9.9* 9.9* 9.4*     --  161     BMP:    Recent Labs     10/21/24  0754 10/22/24  0628   * 134*   K 4.5 4.5   CL 93* 95*   CO2 23 24   BUN 56* 59*   CREATININE 11.8* 12.2*   GLUCOSE 140* 76     Hepatic: No results for input(s): \"AST\", \"ALT\", \"BILITOT\", \"ALKPHOS\" in the last 72 hours.    Invalid input(s): \"ALB\"  Troponin: Invalid input(s): \"TROPONIN\"  BNP: No results for input(s): \"BNP\" in the last 72 hours.  Lipids: No results for input(s): \"CHOL\", \"HDL\" in the last 72 hours.    Invalid 
      SUBJECTIVE    Patient was seen and examined.  This morning vascular or IR was not been able to perform declot of his fistula.  Patient underwent right IJ tunneled catheter placement.  Patient was seen and examined on hemodialysis.  Patient was lying flat alert and awake.  Hemodialysis orders were reviewed with dialysis nurses.  Patient is below his dry weight.  He is tolerating dialysis fairly well.  His blood pressure was elevated and 180 systolic.  Patient states that his abdominal pain has been completely resolved.  Awake and alert  OBJECTIVE      CURRENT TEMPERATURE:  Temp: 98.2 °F (36.8 °C)  MAXIMUM TEMPERATURE OVER 24HRS:  Temp (24hrs), Av.1 °F (36.7 °C), Min:97.7 °F (36.5 °C), Max:98.3 °F (36.8 °C)    CURRENT RESPIRATORY RATE:  Respirations: 18  CURRENT PULSE:  Pulse: 52  CURRENT BLOOD PRESSURE:  BP: (!) 180/88  24HR BLOOD PRESSURE RANGE:  Systolic (24hrs), Av , Min:138 , Max:187   ; Diastolic (24hrs), Av, Min:65, Max:97    24HR INTAKE/OUTPUT:    Intake/Output Summary (Last 24 hours) at 10/17/2024 1409  Last data filed at 10/17/2024 1132  Gross per 24 hour   Intake 430 ml   Output 1019 ml   Net -589 ml     WEIGHT :Patient Vitals for the past 96 hrs (Last 3 readings):   Weight   10/17/24 1309 99.6 kg (219 lb 9.3 oz)   10/16/24 1525 99.6 kg (219 lb 9 oz)   10/16/24 1430 99.5 kg (219 lb 5.7 oz)     PHYSICAL EXAM      GENERAL APPEARANCE: X 3    SKIN: Warm to touch and no erythema   EYES: Pupils are reactive to light  ENT: No thrush or pharyngeal congestion  NECK: JVD or lymphadenopathy.  PULMONARY: Bilateral air entry and clear to auscultation  CADRDIOVASCULAR: S1 and S2 audible no S3 no murmur   ABDOMEN: soft nontender, bowel sounds present, no organomegaly,  no ascites EXTREMITIES: No edema   CURRENT MEDICATIONS      glipiZIDE (GLUCOTROL) tablet 10 mg, QAM AC  insulin glargine (LANTUS) injection vial 25 Units, QAM  metoclopramide (REGLAN) tablet 5 mg, 4x Daily  pantoprazole (PROTONIX) tablet 
      SUBJECTIVE    Patient was seen and examined.  Yesterday, 10/17/2024, a declot was unable to be performed so the patient underwent underwent right IJ temporary catheter placement.  IR wanted to have a tunneled catheter placed today.  Patient is not supportive of the idea.  Vascular surgery has been asked to see the patient and has agreed to proceed with declot. She was awake and alert and comfortable with no chest pain or shortness of breath.  No edema of the extremities.     No current nausea or vomiting or belly pain.  He is on room air.  He does have a history of gastroparesis.  Patient states that his abdominal pain has been completely resolved.  Awake and alert  OBJECTIVE      CURRENT TEMPERATURE:  Temp: 98.2 °F (36.8 °C)  MAXIMUM TEMPERATURE OVER 24HRS:  Temp (24hrs), Av °F (36.7 °C), Min:97.7 °F (36.5 °C), Max:98.2 °F (36.8 °C)    CURRENT RESPIRATORY RATE:  Respirations: 16  CURRENT PULSE:  Pulse: 62  CURRENT BLOOD PRESSURE:  BP: 120/74  24HR BLOOD PRESSURE RANGE:  Systolic (24hrs), Av , Min:104 , Max:187   ; Diastolic (24hrs), Av, Min:56, Max:97    24HR INTAKE/OUTPUT:    Intake/Output Summary (Last 24 hours) at 10/18/2024 1011  Last data filed at 10/18/2024 0857  Gross per 24 hour   Intake 680 ml   Output 2880 ml   Net -2200 ml     WEIGHT :Patient Vitals for the past 96 hrs (Last 3 readings):   Weight   10/18/24 0314 101.7 kg (224 lb 3.2 oz)   10/17/24 1653 97.6 kg (215 lb 2.7 oz)   10/17/24 1309 99.6 kg (219 lb 9.3 oz)     PHYSICAL EXAM      GENERAL APPEARANCE: X 3    SKIN: Warm to touch and no erythema   EYES: Pupils are reactive to light  ENT: No thrush or pharyngeal congestion  NECK: JVD or lymphadenopathy.  PULMONARY: Bilateral air entry and clear to auscultation  CADRDIOVASCULAR: S1 and S2 audible no S3 no murmur   ABDOMEN: soft nontender, bowel sounds present, no organomegaly,  no ascites EXTREMITIES: No edema   CURRENT MEDICATIONS      anticoagulant sodium citrate 4 % injection 1 
  HEMODIALYSIS PRE-TREATMENT NOTE    Patient Identifiers prior to treatment: Name, Birthdate and Band    Isolation Required: na                      Isolation Type: na       (please document if patient is being managed as a PUI/COVID-19 patient)        Hepatitis status:                           Date Drawn                             Result  Hepatitis B Surface Antigen 03/07/2024     neg                     Hepatitis B Surface Antibody 02/08/2024 pos     96.0   Hepatitis B Core Antibody            How was Hepatitis Status verified: labs and chart     Was a copy of the labs you documented provided to facility for the patient's chart: yes    Hemodialysis orders verified: yes    Access Within normal limits ( I.e. s/s of infection,...): yes     Pre-Assessment completed: yes    Pre-dialysis report received from: Reece Dang                      Time: 0830    
  [x] There are one or more home medications that need clarification before Medication Reconciliation can be completed. The Med List Status has been marked as In Progress. To assist with Home Medication Reconciliation the following actions have been taken:    [x] Pharmacy medication reconciliation service requested. (Note: This can be done by sending a Perfect Serve message to [facility] Rec Pharmacist or by phoning [phone].)    [] Family requested to bring medications into the hospital    [] Family requested to call hospital with medication list    [] Message left with physician office    [] Request for medical records made to     [x] Other med rec pharmacist [facility]      
 Thrill felt on fistula but bruit only heard with doppler. It was quite loud earlier today but now it's very quiet with doppler. Charge nurse Misa states vascular will be in after office hours.  
Anit xa 0.39 no change in rate. No bolus  
[facility]  Office: [phone]  Tray Ornelas DO, Lloyd Gamez DO, Robbie Almanzar DO, Amaury Mares DO, Brissa Iglesias MD, Deirdre Cross MD, Quincy Oliva MD, Simran Marshall MD,  Dex Mckeon MD, Barrington Collins MD, Trevon Manuel MD,  Hellen Vides DO, Rhonda Alav MD, aMrio Watson MD, Kermit Ornelas DO, Keiko Mackenzie MD,  Rojelio Roland DO, Annelise Richey MD, Claudia Glover MD, Leanna cAe MD, Tiara Carvalho MD,  Eugenio Null MD, Da Sun MD, Mayra Ridley MD, Rafita Sanders MD, Dakotah Calero MD, Eleanor López MD, Tre Moreland DO, Rufus Sky MD, Shirley Waterhouse, CNP,  Allegra Al, CNP, Tre Julien, CNP,  Blessing High, RAZIA, Natasha Harris, CNP, Sherrill Pineda, CNP, Yessica Rubio, CNP, Estrellita Valentin, CNP, Sonal Self PARhinaC, Lesia Yoder PARhinaC, Dinora Lloyd, CNP, Cassie Sharpe, CNP,  Teresa Rangel, CNP, Elissa Pagan, CNP,  Lyndsay Light, CNP, Reyna Rose, CNP         [facility]   IN-PATIENT SERVICE   [facility]    Progress Note    10/20/2024    8:03 AM    Name:   Hayes Brock  MRN:     [id number]     Acct:      [id number]   Room:   2019/2019-02  IP Day:  4  Admit Date:  10/16/2024  6:11 AM    PCP:   Misael Gibson MD  Code Status:  Full Code    Subjective:     C/C:   Chief Complaint   Patient presents with    Abdominal Pain     Was seen here yesterday for same s/sx. Has not had dialysis since Saturday      Interval History Status:     The patient has been resting on/off throughout the day  He complains of some generalized discomfort as well as some pain to his fistula     Brief History:     Per previous documnetation    \"Admitted 10/16  - Known ESRD patient with gastroparesis. Developed severe epigastric pain with intractable nausea 48 hours ago. Advised he has had gastroparesis episodes in the past but this is far worse in intensity. His pain and nausea was so severe that he has missed his last dialysis 
Dr. Olivares in and ok to discharge today with INR of 1.5 as he anticipates will climb to 2 tomorrow. Pt to receive coumadin prior to dc   
HEMODIALYSIS POST TREATMENT NOTE    Treatment time ordered: 3.5hours    Actual treatment time: 3.25hours    UltraFiltration Goal: 3.0L  UltraFiltration Removed: 2.6L      Pre Treatment weight: 108.5kgs  Post Treatment weight: 106kgs  Estimated Dry Weight: 103.5kgs    Access used:     Central Venous Catheter:          Tunneled or Non-tunneled: Non-Tunneled           Site: Right Neck          Access Flow: Good 400      Internal Access:       AV Fistula or AV Graft: AVF         Site: Right Arm         Access Flow: good       Sign and symptoms of infection: No       If YES: N/A    Medications or blood products given: See MAR    Chronic outpatient schedule: TTS    Chronic outpatient unit: [facility]    Summary of response to treatment: Patient. started to complain of crampling. and the did not feel right. patient wanted to stop treatment, patients blood pressure droped. we gave 200ml fluid bolus. then clamped and returned blood. catheter packed with 1.0 and 1.2 of sodium citrate. a total of 3200ml was removed including bolus and rinse back. Patient was ran reversed for better blood flow. .safety checks complete.     Explain if orders NOT met, was physician notified:N/A      ACES flowsheet faxed to patient unit/ placed in patient chart: Yes    Post assessment completed: yes    Report given to: Ramon SALCIDO  1400      * Intra-treatment documented Safety Checks include the followin) Access and face visible at all times.     2) All connections and blood lines are secure with no kinks.     3) NVL alarm engaged.     4) Hemosafe device applied (if applicable).     5) No collapse of Arterial or Venous blood chambers.     6) All blood lines / pump segments in the air detectors.   
HEMODIALYSIS POST TREATMENT NOTE    Treatment time ordered: 3.5hours    Actual treatment time: 3.5hours    UltraFiltration Goal: 2.0kgs  UltraFiltration Removed: 2.0kgs      Pre Treatment weight: 99.6kgs  Post Treatment weight: 97.6kgs  Estimated Dry Weight: 103.5kgs    Access used:     Central Venous Catheter:          Tunneled or Non-tunneled: Non-Tunneled           Site: Right Neck          Access Flow: Good      Internal Access:       AV Fistula or AV Graft: AVF         Site: Right Arm         Access Flow: Clotted       Sign and symptoms of infection: No       If YES: N/A    Medications or blood products given: See MAR    Chronic outpatient schedule: TTS    Chronic outpatient unit: [facility]    Summary of response to treatment: Patient tolerated treatment good.  2.0Kgs of fluid removed without issues.  CVC clamped and capped.    Explain if orders NOT met, was physician notified:N/A      ACES flowsheet faxed to patient unit/ placed in patient chart: Yes    Post assessment completed: Conchita Chavez    Report given to: Reece FENTON RN      * Intra-treatment documented Safety Checks include the followin) Access and face visible at all times.     2) All connections and blood lines are secure with no kinks.     3) NVL alarm engaged.     4) Hemosafe device applied (if applicable).     5) No collapse of Arterial or Venous blood chambers.     6) All blood lines / pump segments in the air detectors.   
[facility]  Office: [phone]  Tray Ornelas DO, Lloyd Gamez DO, Robbie Almanzar DO, Amaury Mares DO, Brissa Iglesias MD, Deirdre Cross MD, Quincy Oliva MD, Simran Marshall MD,  Dex Mckeon MD, Barrington Collins MD, Trevon Manuel MD,  Hellen Vides DO, Rhonda Alva MD, Mario Watson MD, Kermit Ornelas DO, Keiko Mackenzie MD,  Rojelio Roland DO, Annelise Richey MD, Claudia Glover MD, Leanna Ace MD, Tiara Carvalho MD,  Eugenio Null MD, Da Sun MD, Mayra Ridley MD, Rafita Sanders MD, Dakotha Calero MD, Eleanor López MD, Tre Moreland DO, Rufus Sky MD, Shirley Waterhouse, CNP,  Allegra Al, CNP, Tre Julien, CNP,  Blessing High, RAZIA, Natasha Harris, CNP, Sherrill Pineda, CNP, Yessica Rubio, CNP, Estrellita Valentin, CNP, Sonal Self PARhinaC, EDUARD RamosC, Dinora Lloyd, CNP, Cassie Sharpe, CNP,  Teresa Rangel, CNP, Elissa Pagan, CNP,  Lyndsay Light, CNP, Reyna Rose, CNP         [facility]   IN-PATIENT SERVICE   [facility]    Progress Note    10/17/2024    9:33 AM    Name:   Hayes Brock  MRN:     [id number]     Acct:      [id number]   Room:   2019/2019-02  IP Day:  1  Admit Date:  10/16/2024  6:11 AM    PCP:   Misael Gibson MD  Code Status:  Full Code    Subjective:     C/C:   Chief Complaint   Patient presents with    Abdominal Pain     Was seen here yesterday for same s/sx. Has not had dialysis since Saturday      Interval History Status: significantly improved.     Dramatic improvement in condition overnight.  Patient has responded very well to the Reglan and his abdominal pain is essentially resolved.  Patient underwent partial dialysis yesterday unfortunately he experienced a clot in his fistula.  Full session was not available.  Due to scheduling challenges we will likely be placing a temporary dialysis catheter followed by a fistulogram.    Brief History:     10/16  - Known ESRD patient with gastroparesis. 
Patient off unit for dialysis  
Patient returned to unit s/p dialysis.   
Pre procedure nerve block done in holding. Time out performed and consent was obtained and referenced. Pt  monitored per protocol and tolerated well. VSS . After nerve block performed, pt taken to OR   
Pt admitted to room 2019 from ER. Oriented to room, call light and bed mechanics. Side rails up x2. Call light within reach. Orders reviewed.    
Pt antixa result at 2305 was 0.25. per protocol 2,000un heparin bolus given IV, rate increase by 2un/kg/hr. Rate is now 11un/kg/hr. Verified by two Rns. Antixa recheck ordered at 0600.   
Pt updated on plan of care. NPO status for now awaiting to hear back from IR on plan for fistulogram  
Spiritual Health History and Assessment/Progress Note  [facility]    (P) Spiritual/Emotional Needs, Initial Encounter,  ,  ,      Name: Hayes Brock MRN: [id number]    Age: 59 y.o.     Sex: male   Language: English   Orthodoxy: Shinto   ESRD needing dialysis (HCC)     Date: 10/18/2024            Total Time Calculated: (P) 12 min              Spiritual Assessment began in [facility]        Referral/Consult From: (P) Rounding   Encounter Overview/Reason: (P) Spiritual/Emotional Needs, Initial Encounter  Service Provided For: (P) Patient  Patient values prayer.  Jennifer, Belief, Meaning:   Patient has beliefs or practices that help with coping during difficult times  Family/Friends No family/friends present      Importance and Influence:  Patient has spiritual/personal beliefs that influence decisions regarding their health  Family/Friends No family/friends present    Community:  Patient feels well-supported. Support system includes: Spouse/Partner and Children  Family/Friends No family/friends present    Assessment and Plan of Care:     Patient Interventions include: Facilitated expression of thoughts and feelings, Explored spiritual coping/struggle/distress, Affirmed coping skills/support systems, and Facilitated life review and/ or legacy  Family/Friends Interventions include: No family/friends present    Patient Plan of Care: Spiritual Care available upon further referral  Family/Friends Plan of Care: Spiritual Care available upon further referral    Electronically signed by Chaplain Cassie on 10/18/2024 at 12:18 PM   
Subjective:   patient evaluated . Pt received dialysis Saturday.  He currently has a temporary dialysis catheter.   Yesterday he had little seroma that was drained at bedside  I did not hear a bruit but he does have flow through the fistula with a Doppler      Objective:  CURRENT TEMPERATURE:  Temp: 98.4 °F (36.9 °C)  MAXIMUM TEMPERATURE OVER 24HRS:  Temp (24hrs), Av.3 °F (36.8 °C), Min:98.1 °F (36.7 °C), Max:98.6 °F (37 °C)    CURRENT RESPIRATORY RATE:  Respirations: 16  CURRENT PULSE:  Pulse: 72  CURRENT BLOOD PRESSURE:  BP: 107/65  24HR BLOOD PRESSURE RANGE:  Systolic (24hrs), Av , Min:93 , Max:130   ; Diastolic (24hrs), Av, Min:51, Max:66    24HR INTAKE/OUTPUT:    Intake/Output Summary (Last 24 hours) at 10/21/2024 1049  Last data filed at 10/21/2024 0453  Gross per 24 hour   Intake 1583.19 ml   Output --   Net 1583.19 ml     Patient Vitals for the past 96 hrs (Last 3 readings):   Weight   10/21/24 0311 103.5 kg (228 lb 1.6 oz)   10/20/24 0511 101.8 kg (224 lb 8 oz)   10/19/24 1348 106 kg (233 lb 11 oz)         Physical Exam:  General appearance:Awake, alert, in no acute distress  Skin: warm and dry, no rash or erythema  Eyes: conjunctivae normal and sclera anicteric  ENT:no thrush no pharyngeal congestion   Neck:  No JVD, No Thyromegaly  Pulmonary: clear to auscultation and no wheezing or rhonchi   Cardiovascular: normal S1 and S2. NO rubs and NO murmur. No S3 OR S4   Abdomen: soft nontender, bowel sounds present, no organomegaly,  no ascites   Extremities: no cyanosis, clubbing or edema     Access:  previous  Left AV fistula, temporary dialysis catheter    Current Medications:    warfarin (COUMADIN) tablet 10 mg, Once  HYDROcodone-acetaminophen (NORCO) 5-325 MG per tablet 1 tablet, Q4H PRN   Or  HYDROcodone-acetaminophen (NORCO) 5-325 MG per tablet 2 tablet, Q4H PRN  heparin (porcine) injection 4,000 Units, Once  heparin (porcine) injection 4,000 Units, PRN  heparin (porcine) injection 2,000 
Transitions of Care Pharmacy Service   Medication Review    The patient's list of current home medications has been reviewed and is in progress.     Source(s) of information: [facility], [facility]    Other Notes Pt gets an additional med from dialysis and takes daily at home but doesn't know the name of it. Waiting for dialysis med list to confirm before adding to med list in Epic.         PROVIDER ACTION REQUESTED  Medications that need to be addressed by a physician/nurse practitioner at this time:    Current inpatient order(s) Action requested by pharmacy   Lasix 40mg,  Glipizide 10mg,  Lisinopril 20mg  Verapamil ER 240mg   Not current home meds  (old prescriptions) - consider discontinuing orders if appropriate       Lantus 25 units daily,  Topiramate 50mg BID,  Protonix 40mg daily Consider adjusting orders to match home doses if appropriate:    Lantus 25 units BID  Topiramate 100mg BID  Protonix 40mg BID      Remaining med list needs provider reconciliation (Renvela, warfarin, etc)           Please feel free to call me with any questions about this encounter. Thank you.    Megan Cervantes [facility]   Transitions of Care Pharmacy Service  Phone:  [phone]  Fax: [phone]      Electronically signed by Megan Cervantes [facility] on 10/16/2024 at 5:54 PM         Medications Prior to Admission:   aluminum & magnesium hydroxide-simethicone (MYLANTA) 400-400-40 MG/5ML SUSP, Take 15 mLs by mouth every 6 hours as needed for Indigestion  hydrALAZINE (APRESOLINE) 100 MG tablet, Take 1 tablet by mouth in the morning and 1 tablet at noon and 1 tablet in the evening.  Magnesium Oxide 420 MG TABS, Take 1 tablet by mouth 2 times daily  sevelamer hcl (RENAGEL) 800 MG tablet, Take 1 tablet by mouth 3 times daily (with meals)  warfarin (COUMADIN) 5 MG tablet, Take 1-1.5 tablets by mouth See Admin Instructions Indications: 5mg Mondays, 7.5mg all other days [facility]aged by [facility]  traZODone (DESYREL) 50 MG tablet, Take 1 tablet by 
Transitions of Care Pharmacy Service   Medication Review    The patient's list of current home medications has been reviewed.     Source(s) of information: spoke to patient,  renal care medication list and [facility] medication dispense list     Based on information provided by the above source(s), I have updated the patient's home med list as described below.       I changed or updated the following medications on the patient's home medication list:  Removed Acetaminophen 325 mg tablet   Butalbital-acetaminophen- Caffeine -40 mg tablet  Furosemide 40 mg tablet   Glimepiride 4 mg tablet   ondansetron (ZOFRAN-ODT) 4 MG disintegrating tablet  pantoprazole (PROTONIX) 20 MG tablet  topiramate (TOPAMAX) 50 MG tablet  Verapamil 240 mg tablet   Lisinopril 20 mg tablet   metoclopramide (REGLAN) 10 MG tablet     Added aluminum & magnesium hydroxide-simethicone (MYLANTA) 400-400-40 MG/5ML SUSP  cinacalcet (SENSIPAR) 30 MG tablet  hydrALAZINE (APRESOLINE) 100 MG tablet  Magnesium Oxide 420 MG TABS  sevelamer hcl (RENAGEL) 800 MG tablet  warfarin (COUMADIN) 5 MG tablet  traZODone (DESYREL) 50 MG tablet  NIFEdipine (PROCARDIA XL) 60 MG extended release tablet  folic acid (FOLVITE) 1 MG tablet  ondansetron (ZOFRAN) 4 MG tablet     Adjusted   insulin glargine (LANTUS) 100 UNIT/ML injection vial  pantoprazole (PROTONIX) 40 MG tablet  rosuvastatin (CRESTOR) 10 MG tablet  SUMAtriptan (IMITREX) 100 MG tablet  tadalafil (CIALIS) 20 MG tablet  topiramate (TOPAMAX) 100 MG tablet     Other Notes None            Please feel free to call me with any questions about this encounter. Thank you.    This note will be reviewed and co-signed by the Transitions of Care Pharmacist. The pharmacist will review inpatient orders and contact the physician about any discrepancies.    Melisa Brock, pharmacy technician  Transitions of Care Pharmacy Service  Phone:  [phone]  Fax: [phone]      Electronically signed by Melisa Brock on 
[facility]  Office: [phone]  Tray Ornelas DO, Lloyd Gamez DO, Robbie Almanzar DO, Amaury Mares DO, Brissa Iglesias MD, Deirdre Cross MD, Quincy Olvia MD, Simran Marshall MD,  Dex Mckeon MD, Barrington Collins MD, Trevon Manuel MD,  Hellen Vides DO, Rhonda Alva MD, Mario Watson MD, Kermit Ornelas DO, Keiko Mackenzie MD,  Rojelio Roland DO, Annelise Richey MD, Claudia Glover MD, Leanna Ace MD, Tiara Carvalho MD,  Eugenio Null MD, Da Sun MD, Mayra Ridley MD, Rafita Sanders MD, Dakotah Calero MD, Eleanor López MD, Tre Moreland DO, Rufus Sky MD, Shirley Waterhouse, CNP,  Allegra Al, CNP, Tre Julien, CNP,  Blessing High, RAZIA, Natasha Harris, CNP, Sherrill Pineda, CNP, Yessica Rubio, CNP, Estrellita Valentin, CNP, Sonal Self PARhinaC, EDUARD RamosC, Dinora Lloyd, CNP, Cassie Sharpe, CNP,  Teresa Rangel, CNP, Elissa Pagan, CNP,  Lyndsay Light, CNP, Reyna Rose, CNP         [facility]   IN-PATIENT SERVICE   [facility]    Progress Note    10/21/2024    7:42 AM    Name:   Hayes Brock  MRN:     [id number]     Acct:      [id number]   Room:   2019/2019-02  IP Day:  5  Admit Date:  10/16/2024  6:11 AM    PCP:   Misael Gibson MD  Code Status:  Full Code    Subjective:     C/C:   Chief Complaint   Patient presents with    Abdominal Pain     Was seen here yesterday for same s/sx. Has not had dialysis since Saturday      Interval History Status:     He continues to complain of some general discomfort to the left lower extremity/fistula area but otherwise he feels okay.  Starting Colace; tolerating p.o. intake but not sure when his last BM was    Brief History:     Per previous documnetation    \"Admitted 10/16  - Known ESRD patient with gastroparesis. Developed severe epigastric pain with intractable nausea 48 hours ago. Advised he has had gastroparesis episodes in the past but this is far worse in intensity. His pain and 
[facility]  Office: [phone]  Tray Ornelas DO, Lloyd Gamez DO, Robbie Almanzar DO, Amaury Mares DO, Brissa Iglesias MD, Deirdre Cross MD, Quincy Oliva MD, Simran Marshall MD,  Dex Mckeon MD, Barrington Collins MD, Trevon Manuel MD,  Hellen Vides DO, Rhonda Alva MD, Mario Watson MD, Kermit Ornelas DO, Keiko Mackenzie MD,  Rojelio Roland DO, Annelise Richey MD, Claudia Glover MD, Leanna Ace MD, Tiara Carvalho MD,  Eugenio Null MD, Da Sun MD, Mayra Ridley MD, Rafita Sanders MD, Dakotah Calero MD, Eleanor López MD, Tre Moreland DO, Rufus Sky MD, Shirley Waterhouse, CNP,  Allegra Al, CNP, Tre Julien, CNP,  Blessing High, RAZIA, Natasha Harris, CNP, Sherrill Pineda, CNP, Yessica Rubio, CNP, Estrellita Valentin, CNP, Sonal Self PARhinaC, EDUARD RamosC, Dinora Lloyd, CNP, Cassie Sharpe, CNP,  Teresa Rangel, CNP, Elissa Pagan, CNP,  Lyndsay Light, CNP, Reyna Rose, CNP         [facility]   IN-PATIENT SERVICE   [facility]    Progress Note    10/18/2024    3:52 PM    Name:   Hayes Brock  MRN:     [id number]     Acct:      [id number]   Room:   [facility] OR [place]/NONE   Day:  2  Admit Date:  10/16/2024  6:11 AM    PCP:   Misael Gibson MD  Code Status:  Full Code    Subjective:     C/C:   Chief Complaint   Patient presents with    Abdominal Pain     Was seen here yesterday for same s/sx. Has not had dialysis since Saturday      Interval History Status: significantly improved.         Brief History:     Per previous documnetation    \"Admitted 10/16  - Known ESRD patient with gastroparesis. Developed severe epigastric pain with intractable nausea 48 hours ago. Advised he has had gastroparesis episodes in the past but this is far worse in intensity. His pain and nausea was so severe that he has missed his last dialysis sessions. He is a Tuesday Thursday Saturday dialysis schedule but did not go to dialysis yesterday due to his 
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 Patient on heparin drip to cover.  Give warfarin 10mg today. INR in the morning.    Active problem list reviewed.  INR orders are placed.  Chart reviewed for pertinent labs, drug/diet interactions, and past doses.  Documentation of patient's clinical condition was reviewed.    Pharmacy Dosing:  Pharmacy will continue to follow.      
Warfarin Dosing - Pharmacy Consult Note  Consulting Provider: Allegra Al CNP   Indication:   history of CVC thrombosis  Warfarin Dose prior to admission: 5mg Mon. 7.5mg all other days    Concurrent anticoagulants/antiplatelets: heparin drip until INR therapeutic  Significant Drug Interactions: No obvious interactions  Recent Labs     10/20/24  0341 10/20/24  1503 10/21/24  0754 10/22/24  0627   INR 1.3  --  1.4 1.5   HGB 9.9* 9.9*  --  9.4*     --   --  161     Recent warfarin administrations                     warfarin (COUMADIN) tablet 10 mg (mg) 10 mg Given 10/21/24 1708    warfarin (COUMADIN) tablet 7.5 mg (mg) 7.5 mg Given 10/20/24 1729    warfarin (COUMADIN) tablet 7.5 mg (mg) 7.5 mg Given 10/19/24 1742                   Date   INR    Dose  10/17     1.4       none  10/18     1.2        10 mg  10/19     1.3         7.5 mg  10/20     1.3         7.5 mg  10/21     1.4         10mg  10/22     1.5       Assessment/Plan  (Goal INR: 2 - 3)  INR below goal. Continue boosted dose of coumadin 10mg x 1 today. INR in AM.     Active problem list reviewed.  INR orders are placed.  Chart reviewed for pertinent labs, drug/diet interactions, and past doses.  Documentation of patient's clinical condition was reviewed.    Pharmacy Dosing:  Pharmacy will continue to follow.       
Warfarin Dosing - Pharmacy Consult Note  Consulting Provider: Allegra Al CNP   Indication:   history of CVC thrombosis  Warfarin Dose prior to admission: 5mg Mon/7.5mg all other days ([facility])   Concurrent anticoagulants/antiplatelets: heparin drip until INR is therapeutic   Significant Drug Interactions: No obvious interactions  Recent Labs     10/18/24  0931 10/18/24  1136 10/19/24  0602 10/20/24  0341   INR 1.2  --  1.3 1.3   HGB  --  11.5*  --  9.9*   PLT  --  177  --  144     Recent warfarin administrations                     warfarin (COUMADIN) tablet 7.5 mg (mg) 7.5 mg Given 10/19/24 1742    warfarin (COUMADIN) tablet 10 mg (mg) 10 mg Given 10/18/24 1750                   Date   INR    Dose  10/17     1.4        none  10/18     1.2        10 mg  10/19      1.3        7.5 mg  10/20      1.3     Assessment/Plan  (Goal INR: 2 - 3)  INR still low, but only on day 3 of coumadin. Continue home dosing. Coumadin 7.5mg x 1 today. INR in AM.     Active problem list reviewed.  INR orders are placed.  Chart reviewed for pertinent labs, drug/diet interactions, and past doses.  Documentation of patient's clinical condition was reviewed.    Pharmacy Dosing:  Pharmacy will continue to follow.       
Warfarin Dosing - Pharmacy Consult Note  Consulting Provider: Allegra Al CNP  Indication: history of CVC thrombosis   Warfarin Dose prior to admission: 5mg Mon/7.5mg all other days ([facility])    Concurrent anticoagulants/antiplatelets: Heparin drip until INR therapeutic  Significant Drug Interactions: No obvious interactions    Recent Labs     10/18/24  1136 10/19/24  0602 10/20/24  0341 10/20/24  1503 10/21/24  0754   INR  --  1.3 1.3  --  1.4   HGB 11.5*  --  9.9* 9.9*  --      --  144  --   --      Recent warfarin administrations                     warfarin (COUMADIN) tablet 7.5 mg (mg) 7.5 mg Given 10/20/24 1729    warfarin (COUMADIN) tablet 7.5 mg (mg) 7.5 mg Given 10/19/24 1742    warfarin (COUMADIN) tablet 10 mg (mg) 10 mg Given 10/18/24 1750                   Date   INR    Dose  10/17     1.4        none  10/18     1.2        10 mg  10/19     1.3        7.5 mg  10/20     1.3        7.5 mg  10/21     1.4    Assessment/Plan  (Goal INR: 2 - 3)  INR sub-therapeutic.   Warfarin 10mg today. INR in the morning.    Active problem list reviewed.  INR orders are placed.  Chart reviewed for pertinent labs, drug/diet interactions, and past doses.  Documentation of patient's clinical condition was reviewed.    Pharmacy Dosing:  Pharmacy will continue to follow.      
Warfarin Dosing - Pharmacy Consult Note  Consulting Provider: Allegra lA CNP  Indication:   history of CVC thrombosis   Warfarin Dose prior to admission: 5mg Monday, 7.5mg all other days ([facility] by [facility])   Concurrent anticoagulants/antiplatelets: heparin drip until INR is therapeutic   Significant Drug Interactions: No obvious interactions  Recent Labs     10/17/24  0542 10/18/24  0931 10/18/24  1136 10/19/24  0602   INR 1.4 1.2  --  1.3   HGB 10.1*  --  11.5*  --      --  177  --      Recent warfarin administrations                     warfarin (COUMADIN) tablet 10 mg (mg) 10 mg Given 10/18/24 1750                   Date   INR    Dose  10/17     1.4        none  10/18     1.2        10 mg  10/19     1.3       Assessment/Plan  (Goal INR: 2 - 3)  INR still below goal. Coumadin 7.5mg x 1 today. INR in AM.     Active problem list reviewed.  INR orders are placed.  Chart reviewed for pertinent labs, drug/diet interactions, and past doses.  Documentation of patient's clinical condition was reviewed.    Pharmacy Dosing:  Pharmacy will continue to follow.       
pt has a temporary dialysis cath right side of neck d/t fistula clotting previously. Fistulagram was done yesterday 10/18 for fistula in left forearm. pt has been c/o of pain level staying consistent at 7/10 for both areas during the night. IV morphine 2mg has been given twice through out the night, pt states it works just temporarly. Messaged NP- ordered one time dose of Norco 1 tab 325mg.    
400 mg, BID  sevelamer (RENVELA) tablet 800 mg, TID WC  Vitamin D (CHOLECALCIFEROL) tablet 1,000 Units, Daily  traZODone (DESYREL) tablet 50 mg, Nightly  anticoagulant sodium citrate 4 % injection 1 mL, PRN  anticoagulant sodium citrate 4 % injection 1.2 mL, PRN  morphine (PF) injection 2 mg, Q4H PRN  glipiZIDE (GLUCOTROL) tablet 10 mg, QAM AC  insulin glargine (LANTUS) injection vial 25 Units, QAM  metoclopramide (REGLAN) tablet 5 mg, 4x Daily  pantoprazole (PROTONIX) tablet 40 mg, QAM AC  rosuvastatin (CRESTOR) tablet 5 mg, Daily  sodium chloride flush 0.9 % injection 5-40 mL, 2 times per day  sodium chloride flush 0.9 % injection 10 mL, PRN  0.9 % sodium chloride infusion, PRN  ondansetron (ZOFRAN-ODT) disintegrating tablet 4 mg, Q8H PRN   Or  ondansetron (ZOFRAN) injection 4 mg, Q6H PRN  polyethylene glycol (GLYCOLAX) packet 17 g, Daily PRN  acetaminophen (TYLENOL) tablet 650 mg, Q6H PRN   Or  acetaminophen (TYLENOL) suppository 650 mg, Q6H PRN  glucose chewable tablet 16 g, PRN  dextrose bolus 10% 125 mL, PRN   Or  dextrose bolus 10% 250 mL, PRN  glucagon injection 1 mg, PRN  dextrose 10 % infusion, Continuous PRN  topiramate (TOPAMAX) tablet 100 mg, BID      LABS      CBC:   Recent Labs     10/17/24  0542 10/18/24  1136   WBC 6.8 8.7   RBC 3.24* 3.70*   HGB 10.1* 11.5*   HCT 30.0* 34.5*   MCV 92.6 93.2   MCH 31.2 31.1   MCHC 33.7 33.3   RDW 13.7 13.2    177   MPV 9.1 9.3      BMP:   Recent Labs     10/17/24  0542 10/18/24  0931 10/19/24  0602    135 134*   K 4.3 4.6 4.3    99 98   CO2 24 23 21   BUN 66* 47* 57*   CREATININE 11.3* 8.8* 10.0*   GLUCOSE 100* 198* 95   CALCIUM 8.6 8.6 8.4*        Assessment:  1. ESRD with the patient typically on a Tuesday and Thursday and Saturday hemodialysis schedule at  [facility] under Dr. Love via fistula.  The patient's initial dialysis here in the hospital was in complete secondary to clotted fistula.  A temporary dialysis catheter was placed 
\"CHOL\", \"HDL\" in the last 72 hours.    Invalid input(s): \"LDLCALCU\"  INR:   Recent Labs     10/17/24  0542 10/18/24  0931 10/19/24  0602   INR 1.4 1.2 1.3       Objective:   Vitals: /73   Pulse 70   Temp 98.1 °F (36.7 °C) (Oral)   Resp 16   Ht 1.753 m (5' 9\")   Wt 101.7 kg (224 lb 3.2 oz)   SpO2 98%   BMI 33.11 kg/m²   General appearance: alert, cooperative and no distress  Mental Status: oriented to person, place and time with normal affect  Neck: good carotid pulses, no JVD  Lungs: clear to auscultation bilaterally, normal effort  Heart: regular rate and rhythm, no murmur,  Abdomen: soft, non-tender, non-distended, bowel sounds present all four quadrants, no masses, hepatomegaly, splenomegaly or aortic enlargement  Extremities: no edema, erythema or tenderness in the calves.  Left AV fistula with good thrill  Skin: no gross lesions, rashes, or induration    Assessment:   59-year-old male with thrombosed left arm AV fistula, now functioning well.    Patient Active Problem List:     Severe frontal headaches     Blurring of vision     Intractable migraine     Type 2 diabetes mellitus with stage 3 chronic kidney disease, with long-term current use of insulin (HCC)     Headache     Acute kidney injury superimposed on chronic kidney disease (HCC)     Stage 3 chronic kidney disease (HCC)     Atypical chest pain     Essential hypertension     Class 2 severe obesity due to excess calories with serious comorbidity and body mass index (BMI) of 35.0 to 35.9 in adult     ESRD needing dialysis (HCC)     Chronic anticoagulation     Complication of AV dialysis fistula, initial encounter     Subtherapeutic international normalized ratio (INR)      Plan:   Hold IV heparin for 1 hour and then restart.  Dry dressing to left forearm without excessive pressure.    Electronically signed by Chris Gates MD on 10/19/2024 at 11:38 AM      
capsule 100 mg, Daily  HYDROcodone-acetaminophen (NORCO) 5-325 MG per tablet 1 tablet, Q4H PRN   Or  HYDROcodone-acetaminophen (NORCO) 5-325 MG per tablet 2 tablet, Q4H PRN  heparin (porcine) injection 4,000 Units, Once  heparin (porcine) injection 4,000 Units, PRN  heparin (porcine) injection 2,000 Units, PRN  heparin 25,000 units in dextrose 5% 250 mL (premix) infusion, Continuous  warfarin placeholder: dosing by pharmacy, RX Placeholder  diphenhydrAMINE (BENADRYL) tablet 25 mg, Q6H PRN  hydrALAZINE (APRESOLINE) tablet 100 mg, Q8H  NIFEdipine (PROCARDIA XL) extended release tablet 60 mg, Daily  folic acid (FOLVITE) tablet 1 mg, Daily  magnesium oxide (MAG-OX) tablet 400 mg, BID  sevelamer (RENVELA) tablet 800 mg, TID WC  Vitamin D (CHOLECALCIFEROL) tablet 1,000 Units, Daily  traZODone (DESYREL) tablet 50 mg, Nightly  anticoagulant sodium citrate 4 % injection 1 mL, PRN  anticoagulant sodium citrate 4 % injection 1.2 mL, PRN  morphine (PF) injection 2 mg, Q4H PRN  glipiZIDE (GLUCOTROL) tablet 10 mg, QAM AC  insulin glargine (LANTUS) injection vial 25 Units, QAM  metoclopramide (REGLAN) tablet 5 mg, 4x Daily  pantoprazole (PROTONIX) tablet 40 mg, QAM AC  rosuvastatin (CRESTOR) tablet 5 mg, Daily  sodium chloride flush 0.9 % injection 5-40 mL, 2 times per day  sodium chloride flush 0.9 % injection 10 mL, PRN  0.9 % sodium chloride infusion, PRN  ondansetron (ZOFRAN-ODT) disintegrating tablet 4 mg, Q8H PRN   Or  ondansetron (ZOFRAN) injection 4 mg, Q6H PRN  polyethylene glycol (GLYCOLAX) packet 17 g, Daily PRN  acetaminophen (TYLENOL) tablet 650 mg, Q6H PRN   Or  acetaminophen (TYLENOL) suppository 650 mg, Q6H PRN  glucose chewable tablet 16 g, PRN  dextrose bolus 10% 125 mL, PRN   Or  dextrose bolus 10% 250 mL, PRN  glucagon injection 1 mg, PRN  dextrose 10 % infusion, Continuous PRN  topiramate (TOPAMAX) tablet 100 mg, BID      Labs:   CBC:   Recent Labs     10/20/24  0341 10/20/24  1503 10/22/24  0627   WBC 6.0  -- 
Dressing: Supervision  LE Dressing: Supervision  LE Dressing Skilled Clinical Factors: SUP to devang B shoes while standing EOB  Toileting: Supervision  Functional Mobility: Supervision  Functional Mobility Skilled Clinical Factors: Pt demo'd functional mob for further than a household distance without AD, no safety concerns.  Additional Comments: SUP d/t hosp environment                Vision  Vision: Impaired  Vision Exceptions: Wears glasses for reading  Hearing  Hearing: Within functional limits  Cognition  Overall Cognitive Status: WFL  Orientation  Overall Orientation Status: Within Functional Limits                  Education Given To: Patient  Education Provided: Role of Therapy;Plan of Care;Home Exercise Program;ADL Adaptive Strategies;Transfer Training;Energy Conservation;Family Education;Fall Prevention Strategies  Education Provided Comments: OT POC, purpose of OT in acute care, safety in function, benefits of OOB activity, call light/fall prevention  Education Method: Demonstration;Verbal  Education Outcome: Continued education needed                     AM-PAC - ADL  AM-PAC Daily Activity - Inpatient   How much help is needed for putting on and taking off regular lower body clothing?: None  How much help is needed for bathing (which includes washing, rinsing, drying)?: None  How much help is needed for toileting (which includes using toilet, bedpan, or urinal)?: None  How much help is needed for putting on and taking off regular upper body clothing?: None  How much help is needed for taking care of personal grooming?: None  How much help for eating meals?: None  AM-PAC Inpatient Daily Activity Raw Score: 24  AM-PAC Inpatient ADL T-Scale Score : 57.54  ADL Inpatient CMS 0-100% Score: 0  ADL Inpatient CMS G-Code Modifier : CH        Goals  Short Term Goals  Time Frame for Short Term Goals: by discharge,  Short Term Goal 1: pt to be IND with EC/WS, fall prevention tech, disease specific education, discharge 
needed for Nausea or Vomiting  ondansetron (ZOFRAN-ODT) 4 MG disintegrating tablet, Take 1 tablet by mouth 3 times daily as needed for Nausea or Vomiting (Patient not taking: Reported on 10/16/2024)  metoclopramide (REGLAN) 10 MG tablet, Take 1 tablet by mouth 4 times daily WARNING:  May cause drowsiness.  May impair ability to operate vehicles or machinery.  Do not use in combination with alcohol. (Patient not taking: Reported on 10/16/2024)  pantoprazole (PROTONIX) 40 MG tablet, Take 1 tablet by mouth 2 times daily  tadalafil (CIALIS) 20 MG tablet, Take 1 tablet by mouth as needed for Erectile Dysfunction  SUMAtriptan (IMITREX) 100 MG tablet, Take 1 tablet by mouth as needed for Migraine  [DISCONTINUED] verapamil (CALAN SR) 240 MG extended release tablet,   [DISCONTINUED] furosemide (LASIX) 40 MG tablet,   [DISCONTINUED] butalbital-acetaminophen-caffeine (FIORICET, ESGIC) -40 MG per tablet, Take 1 tablet by mouth every 4 hours as needed for Headaches (Patient not taking: Reported on 10/16/2024)  [DISCONTINUED] acetaminophen (TYLENOL) 325 MG tablet, Take 2 tablets by mouth every 6 hours as needed for Pain (Patient not taking: Reported on 10/16/2024)  [DISCONTINUED] lisinopril (PRINIVIL) 20 MG tablet, Take 1 tablet by mouth daily (Patient not taking: Reported on 10/16/2024)  topiramate (TOPAMAX) 100 MG tablet, Take 1 tablet by mouth 2 times daily  vitamin D (CHOLECALCIFEROL) 25 MCG (1000 UT) TABS tablet, Take 1,000 Units by mouth daily  psyllium (KONSYL) 28.3 % PACK, Take 1 packet by mouth daily as needed for Constipation  rosuvastatin (CRESTOR) 10 MG tablet, Take 0.5 tablets by mouth daily  [DISCONTINUED] glimepiride (AMARYL) 4 MG tablet, Take 8 mg by mouth every morning (before breakfast) Takes 2 tabs (=8mg) once daily (Patient not taking: Reported on 10/16/2024)  insulin glargine (LANTUS) 100 UNIT/ML injection vial, Inject 25 Units into the skin 2 times daily    INVESTIGATIONS     Last 3 CMP:    Recent Labs 
Date/Time    SPECIAL RH 10 ML 10/16/2024 05:32 PM     Lab Results   Component Value Date/Time    CULTURE NO GROWTH 5 DAYS 10/16/2024 05:32 PM       Radiology:  CT ABDOMEN PELVIS WO CONTRAST Additional Contrast? None    Result Date: 10/16/2024  No acute inflammatory obstructive process seen in the abdomen or pelvis.       Physical Examination:        Physical Exam  Constitutional:       General: He is not in acute distress.     Appearance: Normal appearance. He is obese. He is not toxic-appearing.   HENT:      Head: Normocephalic and atraumatic.      Mouth/Throat:      Mouth: Mucous membranes are moist.   Eyes:      Extraocular Movements: Extraocular movements intact.      Pupils: Pupils are equal, round, and reactive to light.   Cardiovascular:      Rate and Rhythm: Normal rate and regular rhythm.      Pulses: Normal pulses.      Heart sounds: Normal heart sounds. No murmur heard.     Arteriovenous access: Left arteriovenous access is present.     Comments: + bruit and thrill   Pulmonary:      Effort: Pulmonary effort is normal. No respiratory distress.   Abdominal:      General: Abdomen is flat. Bowel sounds are normal. There is no distension.      Palpations: Abdomen is soft.      Tenderness: There is no abdominal tenderness.   Musculoskeletal:         General: No swelling or tenderness. Normal range of motion.      Cervical back: Normal range of motion and neck supple.   Skin:     General: Skin is warm and dry.      Capillary Refill: Capillary refill takes less than 2 seconds.      Coloration: Skin is not pale.   Neurological:      General: No focal deficit present.      Mental Status: He is alert and oriented to person, place, and time. Mental status is at baseline.   Psychiatric:         Mood and Affect: Mood normal.         Behavior: Behavior normal.         Thought Content: Thought content normal.         Judgment: Judgment normal.         Assessment:        Hospital Problems             Last Modified POA    
access: Left arteriovenous access is present.     Comments: + bruit and thrill after intervention   Pulmonary:      Effort: Pulmonary effort is normal. No respiratory distress.   Abdominal:      General: Abdomen is flat. Bowel sounds are normal. There is no distension.      Palpations: Abdomen is soft.      Tenderness: There is abdominal tenderness.   Musculoskeletal:         General: No swelling or tenderness. Normal range of motion.      Cervical back: Normal range of motion and neck supple.   Skin:     General: Skin is warm and dry.      Capillary Refill: Capillary refill takes less than 2 seconds.      Coloration: Skin is not pale.   Neurological:      General: No focal deficit present.      Mental Status: He is alert and oriented to person, place, and time. Mental status is at baseline.   Psychiatric:         Mood and Affect: Mood normal.         Behavior: Behavior normal.         Thought Content: Thought content normal.         Judgment: Judgment normal.         Assessment:        Hospital Problems             Last Modified POA    * (Principal) ESRD needing dialysis ([facility]) 10/16/2024 Yes    Type 2 diabetes mellitus with stage 3 chronic kidney disease, with long-term current use of insulin ([facility]) 10/16/2024 Yes    Acute kidney injury superimposed on chronic kidney disease ([facility] [facility]) 10/16/2024 Yes    Stage 3 chronic kidney disease ([facility]) 10/16/2024 Yes    Essential hypertension 10/16/2024 Yes    Class 2 severe obesity due to excess calories with serious comorbidity and body mass index (BMI) of 35.0 to 35.9 in adult 10/16/2024 Yes    Chronic anticoagulation 10/18/2024 Yes    Complication of AV dialysis fistula, initial encounter 10/18/2024 Yes    Subtherapeutic international normalized ratio (INR) 10/18/2024 Yes       Plan:        ESRD requiring dialysis  Consult nephrology,   Ok to use Fistula on Tuesday; use temp cath for now  Renal diet and oral fluid restriction  Continue home dosage of Lasix  Abdominal pain with a

## 2024-10-22 NOTE — DIALYSIS
HEMODIALYSIS POST TREATMENT NOTE    Treatment time ordered: 3.5 hours    Actual treatment time: 3.5 hours    UltraFiltration Goal: 1 to 2 Kg  UltraFiltration Removed: 2 Kg      Pre Treatment weight: 101.3kg  Post Treatment weight: 99.3 kg  Estimated Dry Weight: 103.5 kg    Access used:     Central Venous Catheter:          Tunneled or Non-tunneled: non tunneled           Site: right IJ          Access Flow: Catheter not used today      Internal Access:       AV Fistula or AV Graft: AVf         Site: left lower arm       Access Flow: 400       Sign and symptoms of infection: Patient has a hardened area lower on access arm after fistulogram today. No redness or drainage. AVF accessed with 16 gauge needles without any issues post angioplasty.       If YES: na    Medications or blood products given: none    Chronic outpatient schedule: T/Th/Sat    Chronic outpatient unit: [facility]     Summary of response to treatment: Patient tolerated treatment well. AVF used. See above. Removed 2 kg without any issues. Patient did not require any medications for BP support.    Explain if orders NOT met, was physician notified:Orders met.      ACES flowsheet faxed to patient unit/ placed in patient chart: yes    Post assessment completed: yes    Report given to: Hannah Patel RN      * Intra-treatment documented Safety Checks include the followin) Access and face visible at all times.     2) All connections and blood lines are secure with no kinks.     3) NVL alarm engaged.     4) Hemosafe device applied (if applicable).     5) No collapse of Arterial or Venous blood chambers.     6) All blood lines / pump segments in the air detectors.

## 2024-10-22 NOTE — FLOWSHEET NOTE
10/22/24 1801   Discharge Event   Discharged with Documented Belongings Yes   Departure Mode By self   Mobility at Departure Ambulatory   Discharged to Private Residence   Time of Discharge 1801     Patient stable for discharge. Patient stated understanding of all discharge instructions. Patient left hospital by self in private vehicle with all his belongings.

## 2024-10-23 LAB — SURGICAL PATHOLOGY REPORT: NORMAL

## 2025-01-08 PROCEDURE — 99285 EMERGENCY DEPT VISIT HI MDM: CPT

## 2025-01-09 ENCOUNTER — APPOINTMENT (OUTPATIENT)
Dept: CT IMAGING | Age: 60
End: 2025-01-09
Payer: OTHER GOVERNMENT

## 2025-01-09 ENCOUNTER — HOSPITAL ENCOUNTER (INPATIENT)
Age: 60
LOS: 2 days | Discharge: HOME | End: 2025-01-11
Attending: EMERGENCY MEDICINE | Admitting: FAMILY MEDICINE
Payer: OTHER GOVERNMENT

## 2025-01-09 ENCOUNTER — APPOINTMENT (OUTPATIENT)
Dept: INTERVENTIONAL RADIOLOGY/VASCULAR | Age: 60
End: 2025-01-09
Payer: OTHER GOVERNMENT

## 2025-01-09 DIAGNOSIS — I87.1: ICD-10-CM

## 2025-01-09 DIAGNOSIS — T82.9XXA COMPLICATION OF AV DIALYSIS FISTULA, INITIAL ENCOUNTER: ICD-10-CM

## 2025-01-09 DIAGNOSIS — I87.1 VEIN STENOSIS: ICD-10-CM

## 2025-01-09 DIAGNOSIS — R22.0 FACIAL SWELLING: Primary | ICD-10-CM

## 2025-01-09 PROBLEM — Z86.39 HISTORY OF TYPE 2 DIABETES MELLITUS: Status: ACTIVE | Noted: 2025-01-09

## 2025-01-09 LAB
ANION GAP SERPL CALCULATED.3IONS-SCNC: 14 MMOL/L (ref 9–16)
ANTI-XA UNFRAC HEPARIN: 1.05 IU/L (ref 0.3–0.7)
ANTI-XA UNFRAC HEPARIN: <0.1 IU/L (ref 0.3–0.7)
BASOPHILS # BLD: 0.03 K/UL (ref 0–0.2)
BASOPHILS NFR BLD: 1 % (ref 0–2)
BUN SERPL-MCNC: 36 MG/DL (ref 6–20)
CALCIUM SERPL-MCNC: 8.8 MG/DL (ref 8.6–10.4)
CHLORIDE SERPL-SCNC: 99 MMOL/L (ref 98–107)
CO2 SERPL-SCNC: 25 MMOL/L (ref 20–31)
CREAT SERPL-MCNC: 7.1 MG/DL (ref 0.7–1.2)
EOSINOPHIL # BLD: 0.21 K/UL (ref 0–0.44)
EOSINOPHILS RELATIVE PERCENT: 4 % (ref 1–4)
ERYTHROCYTE [DISTWIDTH] IN BLOOD BY AUTOMATED COUNT: 13.6 % (ref 11.8–14.4)
ERYTHROCYTE [DISTWIDTH] IN BLOOD BY AUTOMATED COUNT: 13.6 % (ref 11.8–14.4)
GFR, ESTIMATED: 8 ML/MIN/1.73M2
GLUCOSE BLD-MCNC: 129 MG/DL (ref 75–110)
GLUCOSE BLD-MCNC: 132 MG/DL (ref 75–110)
GLUCOSE BLD-MCNC: 96 MG/DL (ref 75–110)
GLUCOSE SERPL-MCNC: 140 MG/DL (ref 74–99)
HCT VFR BLD AUTO: 30.2 % (ref 40.7–50.3)
HCT VFR BLD AUTO: 34.5 % (ref 40.7–50.3)
HGB BLD-MCNC: 10.1 G/DL (ref 13–17)
HGB BLD-MCNC: 11.4 G/DL (ref 13–17)
IMM GRANULOCYTES # BLD AUTO: 0.02 K/UL (ref 0–0.3)
IMM GRANULOCYTES NFR BLD: 0 %
INR PPP: 1.2
INR PPP: 1.4
LYMPHOCYTES NFR BLD: 0.8 K/UL (ref 1.1–3.7)
LYMPHOCYTES RELATIVE PERCENT: 15 % (ref 24–43)
MAGNESIUM SERPL-MCNC: 2 MG/DL (ref 1.6–2.6)
MCH RBC QN AUTO: 31.2 PG (ref 25.2–33.5)
MCH RBC QN AUTO: 31.4 PG (ref 25.2–33.5)
MCHC RBC AUTO-ENTMCNC: 33 G/DL (ref 28.4–34.8)
MCHC RBC AUTO-ENTMCNC: 33.4 G/DL (ref 28.4–34.8)
MCV RBC AUTO: 93.8 FL (ref 82.6–102.9)
MCV RBC AUTO: 94.5 FL (ref 82.6–102.9)
MONOCYTES NFR BLD: 0.54 K/UL (ref 0.1–1.2)
MONOCYTES NFR BLD: 10 % (ref 3–12)
NEUTROPHILS NFR BLD: 70 % (ref 36–65)
NEUTS SEG NFR BLD: 3.6 K/UL (ref 1.5–8.1)
NRBC BLD-RTO: 0 PER 100 WBC
NRBC BLD-RTO: 0 PER 100 WBC
PARTIAL THROMBOPLASTIN TIME: 32 SEC (ref 23.9–33.8)
PLATELET # BLD AUTO: 177 K/UL (ref 138–453)
PLATELET # BLD AUTO: 208 K/UL (ref 138–453)
PMV BLD AUTO: 9.4 FL (ref 8.1–13.5)
PMV BLD AUTO: 9.4 FL (ref 8.1–13.5)
POTASSIUM SERPL-SCNC: 4.2 MMOL/L (ref 3.7–5.3)
PROTHROMBIN TIME: 15.3 SEC (ref 11.5–14.2)
PROTHROMBIN TIME: 16.8 SEC (ref 11.5–14.2)
RBC # BLD AUTO: 3.22 M/UL (ref 4.21–5.77)
RBC # BLD AUTO: 3.65 M/UL (ref 4.21–5.77)
SODIUM SERPL-SCNC: 138 MMOL/L (ref 136–145)
WBC OTHER # BLD: 5.2 K/UL (ref 3.5–11.3)
WBC OTHER # BLD: 6.4 K/UL (ref 3.5–11.3)

## 2025-01-09 PROCEDURE — 80048 BASIC METABOLIC PNL TOTAL CA: CPT

## 2025-01-09 PROCEDURE — 6360000002 HC RX W HCPCS: Performed by: FAMILY MEDICINE

## 2025-01-09 PROCEDURE — 2500000003 HC RX 250 WO HCPCS: Performed by: EMERGENCY MEDICINE

## 2025-01-09 PROCEDURE — 85027 COMPLETE CBC AUTOMATED: CPT

## 2025-01-09 PROCEDURE — 36005 INJECTION EXT VENOGRAPHY: CPT

## 2025-01-09 PROCEDURE — 85520 HEPARIN ASSAY: CPT

## 2025-01-09 PROCEDURE — 85730 THROMBOPLASTIN TIME PARTIAL: CPT

## 2025-01-09 PROCEDURE — 36415 COLL VENOUS BLD VENIPUNCTURE: CPT

## 2025-01-09 PROCEDURE — 85025 COMPLETE CBC W/AUTO DIFF WBC: CPT

## 2025-01-09 PROCEDURE — 2580000003 HC RX 258: Performed by: EMERGENCY MEDICINE

## 2025-01-09 PROCEDURE — 85610 PROTHROMBIN TIME: CPT

## 2025-01-09 PROCEDURE — 75820 VEIN X-RAY ARM/LEG: CPT

## 2025-01-09 PROCEDURE — 36901 INTRO CATH DIALYSIS CIRCUIT: CPT

## 2025-01-09 PROCEDURE — 5A1D70Z PERFORMANCE OF URINARY FILTRATION, INTERMITTENT, LESS THAN 6 HOURS PER DAY: ICD-10-PCS | Performed by: FAMILY MEDICINE

## 2025-01-09 PROCEDURE — 2060000000 HC ICU INTERMEDIATE R&B

## 2025-01-09 PROCEDURE — 71275 CT ANGIOGRAPHY CHEST: CPT

## 2025-01-09 PROCEDURE — 82947 ASSAY GLUCOSE BLOOD QUANT: CPT

## 2025-01-09 PROCEDURE — 6370000000 HC RX 637 (ALT 250 FOR IP): Performed by: FAMILY MEDICINE

## 2025-01-09 PROCEDURE — 6360000004 HC RX CONTRAST MEDICATION: Performed by: EMERGENCY MEDICINE

## 2025-01-09 PROCEDURE — 90935 HEMODIALYSIS ONE EVALUATION: CPT

## 2025-01-09 PROCEDURE — 2500000003 HC RX 250 WO HCPCS

## 2025-01-09 PROCEDURE — 6360000004 HC RX CONTRAST MEDICATION: Performed by: RADIOLOGY

## 2025-01-09 PROCEDURE — C1769 GUIDE WIRE: HCPCS

## 2025-01-09 PROCEDURE — 83735 ASSAY OF MAGNESIUM: CPT

## 2025-01-09 PROCEDURE — 2580000003 HC RX 258

## 2025-01-09 PROCEDURE — 6360000002 HC RX W HCPCS

## 2025-01-09 PROCEDURE — 6370000000 HC RX 637 (ALT 250 FOR IP)

## 2025-01-09 PROCEDURE — 76937 US GUIDE VASCULAR ACCESS: CPT

## 2025-01-09 RX ORDER — SODIUM CHLORIDE 9 MG/ML
INJECTION, SOLUTION INTRAVENOUS PRN
Status: DISCONTINUED | OUTPATIENT
Start: 2025-01-09 | End: 2025-01-11 | Stop reason: HOSPADM

## 2025-01-09 RX ORDER — SODIUM CHLORIDE 0.9 % (FLUSH) 0.9 %
10 SYRINGE (ML) INJECTION PRN
Status: DISCONTINUED | OUTPATIENT
Start: 2025-01-09 | End: 2025-01-11 | Stop reason: HOSPADM

## 2025-01-09 RX ORDER — IODIXANOL 320 MG/ML
INJECTION, SOLUTION INTRAVASCULAR PRN
Status: COMPLETED | OUTPATIENT
Start: 2025-01-09 | End: 2025-01-09

## 2025-01-09 RX ORDER — SODIUM CHLORIDE 0.9 % (FLUSH) 0.9 %
5-40 SYRINGE (ML) INJECTION PRN
Status: DISCONTINUED | OUTPATIENT
Start: 2025-01-09 | End: 2025-01-11 | Stop reason: HOSPADM

## 2025-01-09 RX ORDER — ACETAMINOPHEN 325 MG/1
650 TABLET ORAL EVERY 6 HOURS PRN
Status: DISCONTINUED | OUTPATIENT
Start: 2025-01-09 | End: 2025-01-11 | Stop reason: HOSPADM

## 2025-01-09 RX ORDER — ROSUVASTATIN CALCIUM 5 MG/1
5 TABLET, COATED ORAL DAILY
Status: DISCONTINUED | OUTPATIENT
Start: 2025-01-09 | End: 2025-01-11 | Stop reason: HOSPADM

## 2025-01-09 RX ORDER — ONDANSETRON 2 MG/ML
4 INJECTION INTRAMUSCULAR; INTRAVENOUS EVERY 6 HOURS PRN
Status: DISCONTINUED | OUTPATIENT
Start: 2025-01-09 | End: 2025-01-11 | Stop reason: HOSPADM

## 2025-01-09 RX ORDER — INSULIN GLARGINE 100 [IU]/ML
20 INJECTION, SOLUTION SUBCUTANEOUS 2 TIMES DAILY
Status: DISCONTINUED | OUTPATIENT
Start: 2025-01-09 | End: 2025-01-11 | Stop reason: HOSPADM

## 2025-01-09 RX ORDER — ACETAMINOPHEN 650 MG/1
650 SUPPOSITORY RECTAL EVERY 6 HOURS PRN
Status: DISCONTINUED | OUTPATIENT
Start: 2025-01-09 | End: 2025-01-11 | Stop reason: HOSPADM

## 2025-01-09 RX ORDER — PANTOPRAZOLE SODIUM 40 MG/1
40 TABLET, DELAYED RELEASE ORAL 2 TIMES DAILY
Status: DISCONTINUED | OUTPATIENT
Start: 2025-01-09 | End: 2025-01-11 | Stop reason: HOSPADM

## 2025-01-09 RX ORDER — HYDRALAZINE HYDROCHLORIDE 20 MG/ML
10 INJECTION INTRAMUSCULAR; INTRAVENOUS EVERY 6 HOURS PRN
Status: DISCONTINUED | OUTPATIENT
Start: 2025-01-09 | End: 2025-01-09

## 2025-01-09 RX ORDER — WARFARIN SODIUM 7.5 MG/1
7.5 TABLET ORAL ONCE
Status: COMPLETED | OUTPATIENT
Start: 2025-01-09 | End: 2025-01-09

## 2025-01-09 RX ORDER — CINACALCET 30 MG/1
60 TABLET, FILM COATED ORAL DAILY
Status: DISCONTINUED | OUTPATIENT
Start: 2025-01-09 | End: 2025-01-11 | Stop reason: HOSPADM

## 2025-01-09 RX ORDER — BISACODYL 10 MG
10 SUPPOSITORY, RECTAL RECTAL DAILY PRN
Status: DISCONTINUED | OUTPATIENT
Start: 2025-01-09 | End: 2025-01-11 | Stop reason: HOSPADM

## 2025-01-09 RX ORDER — HYDRALAZINE HYDROCHLORIDE 20 MG/ML
20 INJECTION INTRAMUSCULAR; INTRAVENOUS EVERY 6 HOURS PRN
Status: DISCONTINUED | OUTPATIENT
Start: 2025-01-09 | End: 2025-01-11 | Stop reason: HOSPADM

## 2025-01-09 RX ORDER — PROCHLORPERAZINE MALEATE 10 MG
10 TABLET ORAL 2 TIMES DAILY PRN
COMMUNITY

## 2025-01-09 RX ORDER — NIFEDIPINE 10 MG/1
30 CAPSULE ORAL DAILY
COMMUNITY

## 2025-01-09 RX ORDER — HEPARIN SODIUM 10000 [USP'U]/100ML
5-30 INJECTION, SOLUTION INTRAVENOUS CONTINUOUS
Status: DISCONTINUED | OUTPATIENT
Start: 2025-01-09 | End: 2025-01-11

## 2025-01-09 RX ORDER — TRAZODONE HYDROCHLORIDE 50 MG/1
50 TABLET, FILM COATED ORAL NIGHTLY
Status: DISCONTINUED | OUTPATIENT
Start: 2025-01-09 | End: 2025-01-11 | Stop reason: HOSPADM

## 2025-01-09 RX ORDER — HEPARIN SODIUM 1000 [USP'U]/ML
80 INJECTION, SOLUTION INTRAVENOUS; SUBCUTANEOUS ONCE
Status: COMPLETED | OUTPATIENT
Start: 2025-01-09 | End: 2025-01-09

## 2025-01-09 RX ORDER — HEPARIN SODIUM 1000 [USP'U]/ML
40 INJECTION, SOLUTION INTRAVENOUS; SUBCUTANEOUS PRN
Status: DISCONTINUED | OUTPATIENT
Start: 2025-01-09 | End: 2025-01-11

## 2025-01-09 RX ORDER — IOPAMIDOL 755 MG/ML
75 INJECTION, SOLUTION INTRAVASCULAR
Status: COMPLETED | OUTPATIENT
Start: 2025-01-09 | End: 2025-01-09

## 2025-01-09 RX ORDER — POLYETHYLENE GLYCOL 3350 17 G/17G
17 POWDER, FOR SOLUTION ORAL DAILY PRN
Status: DISCONTINUED | OUTPATIENT
Start: 2025-01-09 | End: 2025-01-11 | Stop reason: HOSPADM

## 2025-01-09 RX ORDER — TOPIRAMATE 100 MG/1
100 TABLET, FILM COATED ORAL 2 TIMES DAILY
Status: DISCONTINUED | OUTPATIENT
Start: 2025-01-09 | End: 2025-01-11 | Stop reason: HOSPADM

## 2025-01-09 RX ORDER — SODIUM CHLORIDE 0.9 % (FLUSH) 0.9 %
5-40 SYRINGE (ML) INJECTION EVERY 12 HOURS SCHEDULED
Status: DISCONTINUED | OUTPATIENT
Start: 2025-01-09 | End: 2025-01-11 | Stop reason: HOSPADM

## 2025-01-09 RX ORDER — SUMATRIPTAN 50 MG/1
100 TABLET, FILM COATED ORAL PRN
Status: DISCONTINUED | OUTPATIENT
Start: 2025-01-09 | End: 2025-01-11 | Stop reason: HOSPADM

## 2025-01-09 RX ORDER — HYDRALAZINE HYDROCHLORIDE 25 MG/1
50 TABLET, FILM COATED ORAL 3 TIMES DAILY
Status: DISCONTINUED | OUTPATIENT
Start: 2025-01-09 | End: 2025-01-09

## 2025-01-09 RX ORDER — 0.9 % SODIUM CHLORIDE 0.9 %
80 INTRAVENOUS SOLUTION INTRAVENOUS ONCE
Status: COMPLETED | OUTPATIENT
Start: 2025-01-09 | End: 2025-01-09

## 2025-01-09 RX ORDER — HYDRALAZINE HYDROCHLORIDE 50 MG/1
100 TABLET, FILM COATED ORAL 3 TIMES DAILY
Status: DISCONTINUED | OUTPATIENT
Start: 2025-01-09 | End: 2025-01-11 | Stop reason: HOSPADM

## 2025-01-09 RX ORDER — NIFEDIPINE 30 MG/1
60 TABLET, EXTENDED RELEASE ORAL DAILY
Status: DISCONTINUED | OUTPATIENT
Start: 2025-01-09 | End: 2025-01-11 | Stop reason: HOSPADM

## 2025-01-09 RX ORDER — DEXTROSE MONOHYDRATE 100 MG/ML
INJECTION, SOLUTION INTRAVENOUS CONTINUOUS PRN
Status: DISCONTINUED | OUTPATIENT
Start: 2025-01-09 | End: 2025-01-11 | Stop reason: HOSPADM

## 2025-01-09 RX ORDER — HEPARIN SODIUM 1000 [USP'U]/ML
80 INJECTION, SOLUTION INTRAVENOUS; SUBCUTANEOUS PRN
Status: DISCONTINUED | OUTPATIENT
Start: 2025-01-09 | End: 2025-01-11

## 2025-01-09 RX ORDER — ONDANSETRON 4 MG/1
4 TABLET, ORALLY DISINTEGRATING ORAL EVERY 8 HOURS PRN
Status: DISCONTINUED | OUTPATIENT
Start: 2025-01-09 | End: 2025-01-11 | Stop reason: HOSPADM

## 2025-01-09 RX ORDER — FOLIC ACID 1 MG/1
1 TABLET ORAL DAILY
Status: DISCONTINUED | OUTPATIENT
Start: 2025-01-09 | End: 2025-01-11 | Stop reason: HOSPADM

## 2025-01-09 RX ADMIN — PANTOPRAZOLE SODIUM 40 MG: 40 TABLET, DELAYED RELEASE ORAL at 11:11

## 2025-01-09 RX ADMIN — INSULIN GLARGINE 20 UNITS: 100 INJECTION, SOLUTION SUBCUTANEOUS at 11:12

## 2025-01-09 RX ADMIN — NIFEDIPINE 60 MG: 30 TABLET, FILM COATED, EXTENDED RELEASE ORAL at 11:11

## 2025-01-09 RX ADMIN — HYDRALAZINE HYDROCHLORIDE 100 MG: 50 TABLET ORAL at 21:21

## 2025-01-09 RX ADMIN — SODIUM CHLORIDE, PRESERVATIVE FREE 10 ML: 5 INJECTION INTRAVENOUS at 02:58

## 2025-01-09 RX ADMIN — IODIXANOL 100 ML: 320 INJECTION, SOLUTION INTRAVASCULAR at 09:35

## 2025-01-09 RX ADMIN — TRAZODONE HYDROCHLORIDE 50 MG: 50 TABLET ORAL at 21:21

## 2025-01-09 RX ADMIN — CINACALCET HYDROCHLORIDE 60 MG: 30 TABLET, FILM COATED ORAL at 12:35

## 2025-01-09 RX ADMIN — SODIUM CHLORIDE 80 ML: 9 INJECTION, SOLUTION INTRAVENOUS at 02:58

## 2025-01-09 RX ADMIN — HYDRALAZINE HYDROCHLORIDE 10 MG: 20 INJECTION INTRAMUSCULAR; INTRAVENOUS at 07:12

## 2025-01-09 RX ADMIN — HYDRALAZINE HYDROCHLORIDE 100 MG: 50 TABLET ORAL at 18:30

## 2025-01-09 RX ADMIN — IOPAMIDOL 90 ML: 755 INJECTION, SOLUTION INTRAVENOUS at 02:56

## 2025-01-09 RX ADMIN — TOPIRAMATE 100 MG: 100 TABLET, FILM COATED ORAL at 11:12

## 2025-01-09 RX ADMIN — HEPARIN SODIUM 18 UNITS/KG/HR: 10000 INJECTION, SOLUTION INTRAVENOUS at 11:44

## 2025-01-09 RX ADMIN — WARFARIN SODIUM 7.5 MG: 7.5 TABLET ORAL at 18:30

## 2025-01-09 RX ADMIN — ROSUVASTATIN CALCIUM 5 MG: 5 TABLET, FILM COATED ORAL at 12:35

## 2025-01-09 RX ADMIN — TOPIRAMATE 100 MG: 100 TABLET, FILM COATED ORAL at 21:21

## 2025-01-09 RX ADMIN — HYDRALAZINE HYDROCHLORIDE 100 MG: 50 TABLET ORAL at 11:14

## 2025-01-09 RX ADMIN — SODIUM CHLORIDE, PRESERVATIVE FREE 10 ML: 5 INJECTION INTRAVENOUS at 11:12

## 2025-01-09 RX ADMIN — HEPARIN SODIUM 7800 UNITS: 1000 INJECTION INTRAVENOUS; SUBCUTANEOUS at 12:36

## 2025-01-09 RX ADMIN — FOLIC ACID 1 MG: 1 TABLET ORAL at 11:11

## 2025-01-09 RX ADMIN — SUMATRIPTAN SUCCINATE 100 MG: 25 TABLET ORAL at 06:41

## 2025-01-09 RX ADMIN — SODIUM CHLORIDE 25 ML: 9 INJECTION, SOLUTION INTRAVENOUS at 08:52

## 2025-01-09 RX ADMIN — PANTOPRAZOLE SODIUM 40 MG: 40 TABLET, DELAYED RELEASE ORAL at 21:22

## 2025-01-09 ASSESSMENT — PAIN SCALES - GENERAL
PAINLEVEL_OUTOF10: 8
PAINLEVEL_OUTOF10: 0

## 2025-01-09 NOTE — ED NOTES
Pt arrived to the ED with c/o facial swelling. Pt states this has happened to him before and they found a blood clot in his brain. Pt is a compliant dialysis pt (tues, thurs and sat) and had a full treatment Tuesday. Pt states he feels a pressure on his face/head and his eyes are very watery. Pt is A&O x4, vitals are stable other than hypertension and breathing is even and non-labored. Pt denies needs at this time and call light is within reach.

## 2025-01-09 NOTE — ED NOTES
Message sent to Dr. Oliva regarding pts HTN continuing after 10mg hydralazine via IV. States he will place orders.  Pt taken to IR with LOLY Vargas and will then go to 1022 after.

## 2025-01-09 NOTE — PROGRESS NOTES
Pharmacy Medication Review    The patient's list of current home medications has been reviewed.     PHYSICIANS AND NURSE PRACTITIONERS: please note there is no Transitions of Care/Med Rec Pharmacist available to address any discrepancies on inpatient orders. It is the responsibility of the attending provider to review the updates made to the home med list and adjust inpatient orders as appropriate.        Source(s) of information:patient, [facility]        Based on information provided by the above source(s), I have updated the patient's home med list as described below.     Removed   None      Added prochlorperazine (COMPAZINE) 10 MG tablet     Adjusted   None      Other notes:   None    Are any of the medications noted above considered a 'high alert' medication? no      Is the patient on warfarin at home? Yes, the [facility]  for history of DVT          Please feel free to call me with any questions about this encounter. Thank you.      Melisa Brock, pharmacy technician  [facility]  Phone:  [phone]      Electronically signed by Melisa Brock on 1/9/2025 at 2:16 PM   Note: co-signature by the pharmacist only acknowledges that I have performed a medication review and does not attest to an evaluation of this medication review.        Prior to Admission medications    Medication Sig   NIFEdipine (PROCARDIA) 10 MG immediate release capsule Take 3 capsules by mouth daily   prochlorperazine (COMPAZINE) 10 MG tablet Take 1 tablet by mouth 2 times daily as needed   cinacalcet (SENSIPAR) 30 MG tablet Take 2 tablets by mouth daily   aluminum & magnesium hydroxide-simethicone (MYLANTA) 400-400-40 MG/5ML SUSP Take 15 mLs by mouth every 6 hours as needed for Indigestion   Magnesium Oxide 420 MG TABS Take 1 tablet by mouth 2 times daily   sevelamer hcl (RENAGEL) 800 MG tablet Take 1 tablet by mouth 3 times daily (with meals)   warfarin (COUMADIN) 5 MG tablet Take 1-1.5 tablets by mouth See

## 2025-01-09 NOTE — CONSULTS
VASCULAR SURGERY   CONSULT        Name: Hayes Brock  MRN: [id number]     Acct: [id number]  Room: [address] [place] [address]    Admit Date: 1/9/2025  PCP: Misael Gibson MD    Physician Requesting Consult:  Dr. Oliva    Reason for Consult: Central venous stenosis    Chief Complaint:     Chief Complaint   Patient presents with    Facial Swelling     Started Tuesday.  Hx of blood clots. On blood thinner          History Obtained From:     patient, electronic medical record and nursing    History of Present Illness:      Hayes Brock is a  59 y.o.  male who presents with Facial Swelling (Started Tuesday.  Hx of blood clots. On blood thinner )  Patient presents with facial swelling.  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  Patient is currently on dialysis through a left forearm AV fistula which appears to be functioning well with an excellent bruit and thrill.  Fistula was declotted October 16, 2024 and has been functioning very well per the patient.  He is on Coumadin however INR on admission was 1.2.  He does admit to recently eating a large amount of moody greens and broccoli.  Patient has known history of SVC syndrome and underwent angiography with angioplasty at [facility] in December 2023.  Angiography today does demonstrate central venous occlusion which was unable to be crossed by interventional radiology.  On questioning he states that the facial swelling has improved since admission.    Past Medical History:     Past Medical History:   Diagnosis Date    Chronic anticoagulation 10/18/2024    CKD (chronic kidney disease)     Diabetes mellitus (HCC)     Headache     Hypertension         Past Surgical History:     Past Surgical History:   Procedure Laterality Date    CHG US GUIDANCE NEEDLE PLACEMENT IMG S&I      on thyroid 11/19    DIALYSIS FISTULA CREATION Left     IR NONTUNNELED VASCULAR CATHETER > 5 YEARS  10/17/2024    IR NONTUNNELED  ProviderEfe MD   psyllium (KONSYL) 28.3 % PACK Take 1 packet by mouth daily as needed for Constipation   Yes Efe Staples MD   rosuvastatin (CRESTOR) 10 MG tablet Take 0.5 tablets by mouth daily   Yes Efe Staples MD   insulin glargine (LANTUS) 100 UNIT/ML injection vial Inject 25 Units into the skin 2 times daily Indications: I explained to him we have only been giving it once a day here and his glucose has been good   Yes Efe Staples MD   hydrALAZINE (APRESOLINE) 50 MG tablet Take 1 tablet by mouth 3 times daily  Patient taking differently: Take 2 tablets by mouth 3 times daily 10/22/24   Allegra Al APRN - CNP   tadalafil (CIALIS) 20 MG tablet Take 1 tablet by mouth as needed for Erectile Dysfunction 2/25/21   Efe Staples MD        Allergies:       Dexamethasone    Social History:     Tobacco:    reports that he has never smoked. He has never used smokeless tobacco.  Alcohol:      reports current alcohol use.  Drug Use:  reports no history of drug use.    Family History:     Family History   Problem Relation Age of Onset    Heart Disease Mother     Cancer Mother        Review of Systems:     Positive and Negative as described in HPI    Constitutional:  negative for  fevers, chills, sweats, fatigue, and weight loss  HEENT:  negative for vision or hearing changes,   Respiratory:  negative for shortness of breath, cough, or congestion  Cardiovascular:  negative for  chest pain, palpitations  Gastrointestinal:  negative for nausea, vomiting, diarrhea, constipation, abdominal pain  Genitourinary:  negative for frequency, dysuria  Integument/Breast:  negative for rash, skin lesions  Musculoskeletal:  negative for muscle aches or joint pain  Neurological:  negative for headaches, dizziness, lightheadedness, numbness, pain and tingling extremities  Behavior/Psych:  negative for depression and anxiety    Code Status:  Full Code    Physical Exam:     Vitals:  BP (!)

## 2025-01-09 NOTE — PROGRESS NOTES
Physical Therapy  DATE: 2025    NAME: Hayes Brock  MRN: [id number]   : 1965    Patient not seen this date for Physical Therapy due to:      [] Cancel by RN or physician due to:    [x] Hemodialysis:  PT will continue to follow and ck back as able.      [] Critical Lab Value Level     [] Blood transfusion in progress    [] Acute or unstable cardiovascular status   _MAP < 55 or more than >115  _HR < 40 or > 130    [] Acute or unstable pulmonary status   -FiO2 > 60%   _RR < 5 or >40    _O2 sats < 85%    [] Strict Bedrest    [] Off Unit for surgery or procedure    [] Off Unit for testing       [] Pending imaging to R/O fracture    [] Refusal by Patient      [] Other      [] PT being discontinued at this time. Patient independent. No further needs.     [] PT being discontinued at this time as the patient has been transferred to hospice care. No further needs.      Liliane Yates, PT

## 2025-01-09 NOTE — ED NOTES
ED TO INPATIENT SBAR HANDOFF    Patient Name: Hayes Brock   :  1965  59 y.o.   MRN:  [id number]  Preferred Name  Hayes  ED Room #:  STA22/22  Family/Caregiver Present no   Restraints no   Sitter no   Sepsis Risk Score      Situation  Code Status: Prior No additional code details.    Allergies: Dexamethasone  Weight: Patient Vitals for the past 96 hrs (Last 3 readings):   Weight   25 0004 98 kg (216 lb)     Arrived from: home  Chief Complaint:   Chief Complaint   Patient presents with    Facial Swelling     Started Tuesday.  Hx of blood clots. On blood thinner      Hospital Problem/Diagnosis:  Active Problems:    * No active hospital problems. *  Resolved Problems:    * No resolved hospital problems. *    Imaging:   CTA CHEST W CONTRAST   Final Result   1. Central venous stenosis is demonstrated at the level of the distal right   subclavian vein/SVC junction with collateral flow in the neck.  The   downstream SVC is otherwise patent.   2. No acute airspace disease identified.   3. Stable subcentimeter pulmonary nodules for which no dedicated follow-up is   necessary.         IR PROCEDURAL REQUEST    (Results Pending)     Abnormal labs:   Abnormal Labs Reviewed   BASIC METABOLIC PANEL - Abnormal; Notable for the following components:       Result Value    Glucose 140 (*)     BUN 36 (*)     Creatinine 7.1 (*)     Est, Glom Filt Rate 8 (*)     All other components within normal limits   CBC WITH AUTO DIFFERENTIAL - Abnormal; Notable for the following components:    RBC 3.22 (*)     Hemoglobin 10.1 (*)     Hematocrit 30.2 (*)     Neutrophils % 70 (*)     Lymphocytes % 15 (*)     Lymphocytes Absolute 0.80 (*)     All other components within normal limits   PROTIME-INR - Abnormal; Notable for the following components:    Protime 15.3 (*)     All other components within normal limits     Critical values: yes     Abnormal Assessment Findings: Please see above    Background  History:   Past Medical History:

## 2025-01-09 NOTE — ED PROVIDER NOTES
[facility]  Emergency Medicine Department    Pt Name: Hayes Brock  MRN: [id number]  Birthdate 1965  Date of evaluation: 1/8/2025  Provider: Collin Lorenz MD    CHIEF COMPLAINT     Chief Complaint   Patient presents with    Facial Swelling     Started Tuesday.  Hx of blood clots. On blood thinner        HISTORY OF PRESENT ILLNESS  (Location/Symptom, Timing/Onset, Context/Setting,Quality, Duration, Modifying Factors, Severity.)   Hayes rBock is a 59 y.o. male who presents to the emergency department complaining of facial swelling for the past 3 days.  He states he has a history of blood clots and in the past has had blood clots in the neck that resulted in facial swelling.  He is on blood thinners and reports compliance with his medications.  He denies any chest pain or shortness of breath.  Of note, he has a history of kidney failure and is on dialysis Tuesday Thursday and Saturdays.  Denies any missed dialysis sessions.    Nursing Notes were reviewed.    ALLERGIES     Dexamethasone    CURRENT MEDICATIONS       Previous Medications    ALUMINUM & MAGNESIUM HYDROXIDE-SIMETHICONE (MYLANTA) 400-400-40 MG/5ML SUSP    Take 15 mLs by mouth every 6 hours as needed for Indigestion    CINACALCET (SENSIPAR) 30 MG TABLET    Take 2 tablets by mouth daily    FOLIC ACID (FOLVITE) 1 MG TABLET    Take 1 tablet by mouth daily    HYDRALAZINE (APRESOLINE) 50 MG TABLET    Take 1 tablet by mouth 3 times daily    INSULIN GLARGINE (LANTUS) 100 UNIT/ML INJECTION VIAL    Inject 25 Units into the skin 2 times daily    MAGNESIUM OXIDE 420 MG TABS    Take 1 tablet by mouth 2 times daily    NIFEDIPINE (PROCARDIA XL) 60 MG EXTENDED RELEASE TABLET    Take 1 tablet by mouth daily    PANTOPRAZOLE (PROTONIX) 40 MG TABLET    Take 1 tablet by mouth 2 times daily    PSYLLIUM (KONSYL) 28.3 % PACK    Take 1 packet by mouth daily as needed for Constipation    ROSUVASTATIN (CRESTOR) 10 MG TABLET    Take 0.5 tablets by mouth daily

## 2025-01-09 NOTE — PLAN OF CARE
Problem: Chronic Conditions and Co-morbidities  Goal: Patient's chronic conditions and co-morbidity symptoms are monitored and maintained or improved  Outcome: Progressing  Flowsheets (Taken 1/9/2025 1045)  Care Plan - Patient's Chronic Conditions and Co-Morbidity Symptoms are Monitored and Maintained or Improved: Monitor and assess patient's chronic conditions and comorbid symptoms for stability, deterioration, or improvement     Problem: Discharge Planning  Goal: Discharge to home or other facility with appropriate resources  Outcome: Progressing  Flowsheets (Taken 1/9/2025 1045)  Discharge to home or other facility with appropriate resources: Identify barriers to discharge with patient and caregiver     Problem: ABCDS Injury Assessment  Goal: Absence of physical injury  Outcome: Progressing

## 2025-01-09 NOTE — CONSULTS
Warfarin Dosing - Pharmacy Consult Note  Consulting Provider: Dr. Oliva  Indication:  SVC Syndrome  Warfarin Dose prior to admission: 5mg on Mondays, 7.5mg all other days   Concurrent anticoagulants/antiplatelets: Heparin infusion  Significant Drug Interactions: No obvious interactions  Recent Labs     01/09/25  0154 01/09/25  1021   INR 1.2 1.4   HGB 10.1* 11.4*    208     Recent warfarin administrations        No warfarin orders with administrations found.            Orders not given:            warfarin (COUMADIN) tablet 7.5 mg    warfarin placeholder: dosing by pharmacy                   Date   INR    Dose  1/9         1.4    Assessment/Plan  (Goal INR: 2 - 3)  Subtherapeutic INR, on heparin infusion. Will give patient's normal dose of 7.5mg for today.     Will discontinued heparin infusion once INR therapeutic.     Active problem list reviewed.  INR orders are placed.  Chart reviewed for pertinent labs, drug/diet interactions, and past doses.  Documentation of patient's clinical condition was reviewed.    Pharmacy Dosing:  Pharmacy will continue to follow.

## 2025-01-09 NOTE — CARE COORDINATION
Case Management Assessment  Initial Evaluation    Date/Time of Evaluation: 1/9/2025 12:45 PM  Assessment Completed by: MARTHA ZUNIGA RN    If patient is discharged prior to next notation, then this note serves as note for discharge by case management.    Patient Name: Hayes Brock                   YOB: 1965  Diagnosis: Facial swelling [R22.0]  Vein stenosis [I87.1]  Venous stenosis of upper extremity [I87.1]                   Date / Time: 1/9/2025 12:15 AM    Patient Admission Status: Inpatient   Readmission Risk (Low < 19, Mod (19-27), High > 27): Readmission Risk Score: 20.5    Current PCP: Misael Gibson MD  PCP verified by CM? Yes    Chart Reviewed: Yes      History Provided by: Patient  Patient Orientation: Alert and Oriented    Patient Cognition: Alert    Hospitalization in the last 30 days (Readmission):  No    If yes, Readmission Assessment in CM Navigator will be completed.    Advance Directives:      Code Status: Full Code   Patient's Primary Decision Maker is: Legal Next of Kin      Discharge Planning:    Patient lives with: Alone Type of Home: House  Primary Care Giver: Self  Patient Support Systems include: Children, Family Members   Current Financial resources: Medicare,  ([facility])  Current community resources: None  Current services prior to admission: None            Current DME:              Type of Home Care services:  None    ADLS  Prior functional level: Independent in ADLs/IADLs  Current functional level: Independent in ADLs/IADLs    PT AM-PAC:   /24  OT AM-PAC:   /24    Family can provide assistance at DC: Yes  Would you like Case Management to discuss the discharge plan with any other family members/significant others, and if so, who? No  Plans to Return to Present Housing: Yes  Other Identified Issues/Barriers to RETURNING to current housing:  fistulogram   Potential Assistance needed at discharge: N/A            Potential DME:    Patient expects to discharge  RN  Case Management Department

## 2025-01-09 NOTE — CONSULTS
Nephrology ESRD Consult Note    Reason for Consult:  Fluid and hypertension management for pt with ESRD     Requesting Physician: Dr. Oliva    History Obtained From:  patient, electronic medical record    HD Unit:        [facility] care at [place]    Dry Weight:       100 kg  Chief Complaint: Facial edema    History of Present Illness:              This is a 59 y.o. male with end stage renal disease on hemodialysis [id number] who presents to the hospital for evaluation of facial edema.  He also has pressure in his forehead and face.  Other than that he has not had any other issues.  No recent falls or trauma.  Patient denies any visual abnormalities.  He has no issues with hearing.  Facial edema started about 2 days ago.  He has had occasional headaches  Patient tells me that he was on chronic Coumadin therapy at home because of blood clots in the chest.  Patient has history of ESRD, he dialyzes TTS schedule under Dr. Becerra were at  renal care on [place].  He does have history of essential hypertension, history of diabetes mellitus type 2, history of dyslipidemia.  He has an AV fistula in the left upper extremity and he has had fistula thrombectomy in the past by Dr. Gates  He has been pretty compliant in regards to his dialysis treatments.  Patient does not give any history of worsening edema in the hand the left upper extremity or lower extremities.    Following evaluation in the ER patient underwent CTA of the chest showing central venous stenosis at the level of distal right subclavian vein and SVC junction with collateral flow in the neck.  He was started on heparin.    His outpatient history and dialysis orders, usual dry wt  and out pt dialysis run sheets were requested .     Past Medical History:        Diagnosis Date    Chronic anticoagulation 10/18/2024    CKD (chronic kidney disease)     Diabetes mellitus (HCC)     Headache     Hypertension        Access:  previous  Left AV fistula    Past  and his glucose has been good  hydrALAZINE (APRESOLINE) 50 MG tablet, Take 1 tablet by mouth 3 times daily (Patient taking differently: Take 2 tablets by mouth 3 times daily)  [DISCONTINUED] NIFEdipine (PROCARDIA XL) 60 MG extended release tablet, Take 30 mg by mouth daily  tadalafil (CIALIS) 20 MG tablet, Take 1 tablet by mouth as needed for Erectile Dysfunction    Current Medications:    sodium chloride flush 0.9 % injection 10 mL, PRN  sodium chloride flush 0.9 % injection 5-40 mL, 2 times per day  sodium chloride flush 0.9 % injection 5-40 mL, PRN  0.9 % sodium chloride infusion, PRN  ondansetron (ZOFRAN-ODT) disintegrating tablet 4 mg, Q8H PRN   Or  ondansetron (ZOFRAN) injection 4 mg, Q6H PRN  polyethylene glycol (GLYCOLAX) packet 17 g, Daily PRN  bisacodyl (DULCOLAX) suppository 10 mg, Daily PRN  acetaminophen (TYLENOL) tablet 650 mg, Q6H PRN   Or  acetaminophen (TYLENOL) suppository 650 mg, Q6H PRN  SUMAtriptan (IMITREX) tablet 100 mg, PRN  hydrALAZINE (APRESOLINE) injection 20 mg, Q6H PRN  cinacalcet (SENSIPAR) tablet 60 mg, Daily  folic acid (FOLVITE) tablet 1 mg, Daily  insulin glargine (LANTUS) injection vial 20 Units, BID  NIFEdipine (PROCARDIA XL) extended release tablet 60 mg, Daily  pantoprazole (PROTONIX) tablet 40 mg, BID  rosuvastatin (CRESTOR) tablet 5 mg, Daily  topiramate (TOPAMAX) tablet 100 mg, BID  traZODone (DESYREL) tablet 50 mg, Nightly  heparin (porcine) injection 7,800 Units, PRN  heparin (porcine) injection 3,900 Units, PRN  heparin 25,000 units in dextrose 5% 250 mL (premix) infusion, Continuous  hydrALAZINE (APRESOLINE) tablet 100 mg, TID  glucose chewable tablet 16 g, PRN  dextrose bolus 10% 125 mL, PRN   Or  dextrose bolus 10% 250 mL, PRN  glucagon injection 1 mg, PRN  dextrose 10 % infusion, Continuous PRN  warfarin (COUMADIN) tablet 7.5 mg, Once  warfarin placeholder: dosing by pharmacy, RX Placeholder        Allergies:  Dexamethasone    Social History:    Social History

## 2025-01-09 NOTE — PROGRESS NOTES
Occupational Therapy  DATE: 2025    NAME: Hayes Brock  MRN: [id number]   : 1965    Patient not seen this date for Occupational Therapy due to:      [] Cancel by RN or physician due to:    [x] Hemodialysis    [] Critical Lab Value Level     [] Blood transfusion in progress    [] Acute or unstable cardiovascular status   _MAP < 55 or more than >115  _HR < 40 or > 130    [] Acute or unstable pulmonary status   -FiO2 > 60%   _RR < 5 or >40    _O2 sats < 85%    [] Strict Bedrest    [] Off Unit for surgery or procedure    [] Off Unit for testing       [] Pending imaging to R/O fracture    [] Refusal by Patient      [] Other      [] OT being discontinued at this time. Patient independent. No further needs.     [] OT being discontinued at this time as the patient has been transferred to hospice care. No further needs.      Nery Rdz, OT

## 2025-01-09 NOTE — H&P
- 31 mmol/L    Anion Gap 14 9 - 16 mmol/L    Glucose 140 (H) 74 - 99 mg/dL    BUN 36 (H) 6 - 20 mg/dL    Creatinine 7.1 (HH) 0.70 - 1.20 mg/dL    Est, Glom Filt Rate 8 (L) >60 mL/min/1.73m2    Calcium 8.8 8.6 - 10.4 mg/dL   CBC with Auto Differential    Collection Time: 01/09/25  1:54 AM   Result Value Ref Range    WBC 5.2 3.5 - 11.3 k/uL    RBC 3.22 (L) 4.21 - 5.77 m/uL    Hemoglobin 10.1 (L) 13.0 - 17.0 g/dL    Hematocrit 30.2 (L) 40.7 - 50.3 %    MCV 93.8 82.6 - 102.9 fL    MCH 31.4 25.2 - 33.5 pg    MCHC 33.4 28.4 - 34.8 g/dL    RDW 13.6 11.8 - 14.4 %    Platelets 177 138 - 453 k/uL    MPV 9.4 8.1 - 13.5 fL    NRBC Automated 0.0 0.0 per 100 WBC    Neutrophils % 70 (H) 36 - 65 %    Lymphocytes % 15 (L) 24 - 43 %    Monocytes % 10 3 - 12 %    Eosinophils % 4 1 - 4 %    Basophils % 1 0 - 2 %    Immature Granulocytes % 0 0 %    Neutrophils Absolute 3.60 1.50 - 8.10 k/uL    Lymphocytes Absolute 0.80 (L) 1.10 - 3.70 k/uL    Monocytes Absolute 0.54 0.10 - 1.20 k/uL    Eosinophils Absolute 0.21 0.00 - 0.44 k/uL    Basophils Absolute 0.03 0.00 - 0.20 k/uL    Immature Granulocytes Absolute 0.02 0.00 - 0.30 k/uL   Protime-INR    Collection Time: 01/09/25  1:54 AM   Result Value Ref Range    Protime 15.3 (H) 11.5 - 14.2 sec    INR 1.2    Magnesium    Collection Time: 01/09/25  1:54 AM   Result Value Ref Range    Magnesium 2.0 1.6 - 2.6 mg/dL       Imaging/Diagnostics:  CTA CHEST W CONTRAST    Result Date: 1/9/2025  1. Central venous stenosis is demonstrated at the level of the distal right subclavian vein/SVC junction with collateral flow in the neck.  The downstream SVC is otherwise patent. 2. No acute airspace disease identified. 3. Stable subcentimeter pulmonary nodules for which no dedicated follow-up is necessary.       Assessment :      Hospital Problems             Last Modified POA    * (Principal) Vein stenosis 1/9/2025 Yes    Type 2 diabetes mellitus with stage 3 chronic kidney disease, with long-term current use  of insulin (HCC) 1/9/2025 Yes    Essential hypertension 1/9/2025 Yes    ESRD needing dialysis ([facility]) 1/9/2025 Yes    Chronic anticoagulation 1/9/2025 Yes       Plan:     Patient status inpatient in the  Med/Surge    Facial swelling: Consult vascular surgery, consult IR today for fistulogram, hold blood thinning medications until after procedure, keep n.p.o.  ESRD: Consult to nephrology for dialysis today  H/o DM, HTN: Stable, resume home medications when no longer n.p.o.    Consultations:   IP CONSULT TO VASCULAR SURGERY  IP CONSULT TO NEPHROLOGY     Patient is admitted as inpatient status because of co-morbidities listed above, severity of signs and symptoms as outlined, requirement for current medical therapies and most importantly because of direct risk to patient if care not provided in a hospital setting.  Expected length of stay > 48 hours.    Teresa Ragnel, APRN - CNP  1/9/2025  6:02 AM    Copy sent to Dr. Gibson, Misael CASTRO MD

## 2025-01-10 LAB
ANION GAP SERPL CALCULATED.3IONS-SCNC: 13 MMOL/L (ref 9–16)
ANTI-XA UNFRAC HEPARIN: 0.28 IU/L (ref 0.3–0.7)
ANTI-XA UNFRAC HEPARIN: 0.47 IU/L (ref 0.3–0.7)
ANTI-XA UNFRAC HEPARIN: 0.6 IU/L (ref 0.3–0.7)
BASOPHILS # BLD: 0.04 K/UL (ref 0–0.2)
BASOPHILS NFR BLD: 1 % (ref 0–2)
BUN SERPL-MCNC: 21 MG/DL (ref 6–20)
CALCIUM SERPL-MCNC: 8.4 MG/DL (ref 8.6–10.4)
CHLORIDE SERPL-SCNC: 99 MMOL/L (ref 98–107)
CO2 SERPL-SCNC: 23 MMOL/L (ref 20–31)
CREAT SERPL-MCNC: 5.7 MG/DL (ref 0.7–1.2)
EOSINOPHIL # BLD: 0.27 K/UL (ref 0–0.44)
EOSINOPHILS RELATIVE PERCENT: 5 % (ref 1–4)
ERYTHROCYTE [DISTWIDTH] IN BLOOD BY AUTOMATED COUNT: 13.7 % (ref 11.8–14.4)
GFR, ESTIMATED: 11 ML/MIN/1.73M2
GLUCOSE BLD-MCNC: 109 MG/DL (ref 75–110)
GLUCOSE BLD-MCNC: 119 MG/DL (ref 75–110)
GLUCOSE BLD-MCNC: 148 MG/DL (ref 75–110)
GLUCOSE BLD-MCNC: 156 MG/DL (ref 75–110)
GLUCOSE SERPL-MCNC: 145 MG/DL (ref 74–99)
HCT VFR BLD AUTO: 32.5 % (ref 40.7–50.3)
HGB BLD-MCNC: 10.7 G/DL (ref 13–17)
IMM GRANULOCYTES # BLD AUTO: 0.03 K/UL (ref 0–0.3)
IMM GRANULOCYTES NFR BLD: 1 %
INR PPP: 1.7
LYMPHOCYTES NFR BLD: 1.09 K/UL (ref 1.1–3.7)
LYMPHOCYTES RELATIVE PERCENT: 18 % (ref 24–43)
MCH RBC QN AUTO: 30.9 PG (ref 25.2–33.5)
MCHC RBC AUTO-ENTMCNC: 32.9 G/DL (ref 28.4–34.8)
MCV RBC AUTO: 93.9 FL (ref 82.6–102.9)
MONOCYTES NFR BLD: 0.44 K/UL (ref 0.1–1.2)
MONOCYTES NFR BLD: 7 % (ref 3–12)
NEUTROPHILS NFR BLD: 68 % (ref 36–65)
NEUTS SEG NFR BLD: 4.12 K/UL (ref 1.5–8.1)
NRBC BLD-RTO: 0 PER 100 WBC
PLATELET # BLD AUTO: 198 K/UL (ref 138–453)
PMV BLD AUTO: 9.6 FL (ref 8.1–13.5)
POTASSIUM SERPL-SCNC: 4.5 MMOL/L (ref 3.7–5.3)
PROTHROMBIN TIME: 20 SEC (ref 11.5–14.2)
RBC # BLD AUTO: 3.46 M/UL (ref 4.21–5.77)
SODIUM SERPL-SCNC: 135 MMOL/L (ref 136–145)
WBC OTHER # BLD: 6 K/UL (ref 3.5–11.3)

## 2025-01-10 PROCEDURE — 82947 ASSAY GLUCOSE BLOOD QUANT: CPT

## 2025-01-10 PROCEDURE — 6360000002 HC RX W HCPCS: Performed by: FAMILY MEDICINE

## 2025-01-10 PROCEDURE — 85025 COMPLETE CBC W/AUTO DIFF WBC: CPT

## 2025-01-10 PROCEDURE — 85520 HEPARIN ASSAY: CPT

## 2025-01-10 PROCEDURE — 6370000000 HC RX 637 (ALT 250 FOR IP): Performed by: FAMILY MEDICINE

## 2025-01-10 PROCEDURE — 2060000000 HC ICU INTERMEDIATE R&B

## 2025-01-10 PROCEDURE — 85610 PROTHROMBIN TIME: CPT

## 2025-01-10 PROCEDURE — 36415 COLL VENOUS BLD VENIPUNCTURE: CPT

## 2025-01-10 PROCEDURE — 80048 BASIC METABOLIC PNL TOTAL CA: CPT

## 2025-01-10 RX ORDER — WARFARIN SODIUM 7.5 MG/1
7.5 TABLET ORAL
Status: COMPLETED | OUTPATIENT
Start: 2025-01-10 | End: 2025-01-10

## 2025-01-10 RX ADMIN — NIFEDIPINE 60 MG: 30 TABLET, FILM COATED, EXTENDED RELEASE ORAL at 08:36

## 2025-01-10 RX ADMIN — HYDRALAZINE HYDROCHLORIDE 100 MG: 50 TABLET ORAL at 08:36

## 2025-01-10 RX ADMIN — PANTOPRAZOLE SODIUM 40 MG: 40 TABLET, DELAYED RELEASE ORAL at 21:06

## 2025-01-10 RX ADMIN — CINACALCET HYDROCHLORIDE 60 MG: 30 TABLET, FILM COATED ORAL at 08:35

## 2025-01-10 RX ADMIN — ROSUVASTATIN CALCIUM 5 MG: 5 TABLET, FILM COATED ORAL at 08:36

## 2025-01-10 RX ADMIN — TRAZODONE HYDROCHLORIDE 50 MG: 50 TABLET ORAL at 21:05

## 2025-01-10 RX ADMIN — WARFARIN SODIUM 7.5 MG: 7.5 TABLET ORAL at 17:41

## 2025-01-10 RX ADMIN — HEPARIN SODIUM 3900 UNITS: 1000 INJECTION INTRAVENOUS; SUBCUTANEOUS at 13:00

## 2025-01-10 RX ADMIN — TOPIRAMATE 100 MG: 100 TABLET, FILM COATED ORAL at 08:35

## 2025-01-10 RX ADMIN — INSULIN GLARGINE 20 UNITS: 100 INJECTION, SOLUTION SUBCUTANEOUS at 08:36

## 2025-01-10 RX ADMIN — INSULIN GLARGINE 20 UNITS: 100 INJECTION, SOLUTION SUBCUTANEOUS at 21:05

## 2025-01-10 RX ADMIN — HYDRALAZINE HYDROCHLORIDE 100 MG: 50 TABLET ORAL at 15:30

## 2025-01-10 RX ADMIN — HEPARIN SODIUM 15 UNITS/KG/HR: 10000 INJECTION, SOLUTION INTRAVENOUS at 04:17

## 2025-01-10 RX ADMIN — HYDRALAZINE HYDROCHLORIDE 100 MG: 50 TABLET ORAL at 21:05

## 2025-01-10 RX ADMIN — PANTOPRAZOLE SODIUM 40 MG: 40 TABLET, DELAYED RELEASE ORAL at 08:36

## 2025-01-10 RX ADMIN — FOLIC ACID 1 MG: 1 TABLET ORAL at 08:35

## 2025-01-10 RX ADMIN — TOPIRAMATE 100 MG: 100 TABLET, FILM COATED ORAL at 21:05

## 2025-01-10 RX ADMIN — HEPARIN SODIUM 17 UNITS/KG/HR: 10000 INJECTION, SOLUTION INTRAVENOUS at 21:10

## 2025-01-10 ASSESSMENT — ENCOUNTER SYMPTOMS
DIARRHEA: 0
COUGH: 0
BACK PAIN: 0
NAUSEA: 0
WHEEZING: 0
CHEST TIGHTNESS: 0
CONSTIPATION: 0
CHOKING: 0
SHORTNESS OF BREATH: 0
RHINORRHEA: 0
SINUS PRESSURE: 0
VOICE CHANGE: 0
VOMITING: 0
ABDOMINAL PAIN: 0

## 2025-01-10 NOTE — PLAN OF CARE
Shift uneventful, waiting INR to reach 2-3, plans for dialysis treatment Saturday and discharge home if INR is therapeutic    Problem: Safety - Adult  Goal: Free from fall injury  Outcome: Progressing     Problem: Pain  Goal: Verbalizes/displays adequate comfort level or baseline comfort level  1/10/2025 0840 by Brittani Bosch RN  Outcome: Progressing     Problem: Chronic Conditions and Co-morbidities  Goal: Patient's chronic conditions and co-morbidity symptoms are monitored and maintained or improved  1/10/2025 0840 by Brittani Bosch, RN  Outcome: Progressing     Problem: Discharge Planning  Goal: Discharge to home or other facility with appropriate resources  1/10/2025 0840 by Brittani Bosch, RN  Outcome: Progressing

## 2025-01-10 NOTE — PROGRESS NOTES
Pt remained calm and cooperative tonight. Pt remained on RA. Heparin gtt continues running at 15 units/kg/hr. At around 2300, pt's anti-xa came back 1.05. Heparin gtt held for an hour, then decreased from 18 to 15 units/kg/hr per protocol. At 0503, pt's anti-xa came back therapeutic. No rate change, so next draw at 1100. Will continue with care plan.

## 2025-01-10 NOTE — PROGRESS NOTES
Warfarin Dosing - Pharmacy Consult Note  Consulting Provider: Quincy Oliva MD  Indication: SVC Syndrome   Warfarin Dose prior to admission: 5mg on Mondays, 7.5mg all other days    Concurrent anticoagulants/antiplatelets: heparin drip (until INR therapeutic)  Significant Drug Interactions: No obvious interactions    Recent Labs     01/09/25  0154 01/09/25  1021 01/10/25  0503   INR 1.2 1.4 1.7   HGB 10.1* 11.4* 10.7*    208 198      Date   INR    Dose  1/9        1.4        7.5 mg  1/10      1.7    Assessment/Plan  (Goal INR: 2 - 3)  INR sub-therapeutic but climbing. Patient is covered with heparin drip until INR therapeutic. Will continue with home dose regimen.    Warfarin 7.5 mg today. INR in the morning.    Active problem list reviewed.  INR orders are placed.  Chart reviewed for pertinent labs, drug/diet interactions, and past doses.  Documentation of patient's clinical condition was reviewed.    Pharmacy Dosing:  Pharmacy will continue to follow.

## 2025-01-10 NOTE — PROGRESS NOTES
Physical Therapy  DATE: 1/10/2025    NAME: Hayes Brock  MRN: [id number]   : 1965    Patient not seen this date for Physical Therapy due to:      [] Cancel by RN or physician due to:    [] Hemodialysis    [] Critical Lab Value Level     [] Blood transfusion in progress    [] Acute or unstable cardiovascular status   _MAP < 55 or more than >115  _HR < 40 or > 130    [] Acute or unstable pulmonary status   -FiO2 > 60%   _RR < 5 or >40    _O2 sats < 85%    [] Strict Bedrest    [] Off Unit for surgery or procedure    [] Off Unit for testing       [] Pending imaging to R/O fracture    [] Refusal by Patient      [] Other      [x] PT being discontinued at this time. Patient independent. No further needs. Patient was observed ambulating indep in room.     [] PT being discontinued at this time as the patient has been transferred to hospice care. No further needs.      Kaleigh Street, PT

## 2025-01-10 NOTE — PROGRESS NOTES
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)     Headache     Essential hypertension     Class 2 severe obesity due to excess calories with serious comorbidity and body mass index (BMI) of 35.0 to 35.9 in adult     ESRD on hemodialysis (HCC)     Chronic anticoagulation     Complication of AV dialysis fistula, initial encounter     Subtherapeutic international normalized ratio (INR)     Vein stenosis     Facial swelling     History of type 2 diabetes mellitus      Plan:   Continue IV heparin  Continue Coumadin keeping his INR between 2 and 3  Discharge planning    Electronically signed by Chrsi Gates MD on 1/10/2025 at 2:31 PM

## 2025-01-10 NOTE — PROGRESS NOTES
Renal Progress Note    Patient :  Hayes Brock; 59 y.o. MRN# [id number]  Location:  1022/1022-01  Attending:  Quincy Oliva MD  Admit Date:  1/9/2025   Hospital Day: 1      Subjective:     Oral intake good.  Feels well.  Facial swelling improving.  IV heparin continues.  Coumadin has been started.  INR up to 1.7.  Awaiting therapeutic INR.  Had hemodialysis yesterday 3 L removed.  Left AV fistula works well.  Vascular evaluation noted.  No surgical intervention needed.  He does have a known history of SVC syndrome according to the records and previously has had angioplasty at [facility].  Labs today showed a sodium of 135 potassium 4.5 chloride 99 bicarb 23 BUN 21 creatinine 5.7 calcium 8.4 hemoglobin 10.7 white count 6 platelets 198    History reviewed  Known history of ESRD on hemodialysis Tuesday Thursday Saturday at the  renal [facility] via left AV fistula under Dr. Love.  Known history of SVC syndrome is on chronic anticoagulation.  Came into the hospital with worsening facial swelling.  INR was subtherapeutic.  Imaging confirmed evidence of SVC syndrome.  Stenosis at the level of right subclavian and SVC junction.  Anticoagulation started.  Symptoms improving.  Had hemodialysis yesterday    Outpatient Medications:     Medications Prior to Admission: NIFEdipine (PROCARDIA) 10 MG immediate release capsule, Take 3 capsules by mouth daily  prochlorperazine (COMPAZINE) 10 MG tablet, Take 1 tablet by mouth 2 times daily as needed  cinacalcet (SENSIPAR) 30 MG tablet, Take 2 tablets by mouth daily  aluminum & magnesium hydroxide-simethicone (MYLANTA) 400-400-40 MG/5ML SUSP, Take 15 mLs by mouth every 6 hours as needed for Indigestion  Magnesium Oxide 420 MG TABS, Take 1 tablet by mouth 2 times daily  sevelamer hcl (RENAGEL) 800 MG tablet, Take 1 tablet by mouth 3 times daily (with meals)  warfarin (COUMADIN) 5 MG tablet, Take 1-1.5 tablets by mouth See Admin Instructions Indications: 5mg Mondays,  ondansetron, polyethylene glycol, bisacodyl, acetaminophen **OR** acetaminophen, SUMAtriptan, hydrALAZINE, heparin (porcine), heparin (porcine), glucose, dextrose bolus **OR** dextrose bolus, glucagon (rDNA), dextrose    Input/Output:       I/O last 3 completed shifts:  In: 784.6 [I.V.:284.6]  Out: 3800 [Urine:300].    Patient Vitals for the past 96 hrs (Last 3 readings):   Weight   01/10/25 0520 99.1 kg (218 lb 6.4 oz)   25 1821 98 kg (216 lb 0.8 oz)   25 1450 100.4 kg (221 lb 5.5 oz)       Vital Signs:   Temperature:  Temp: 97.7 °F (36.5 °C)  TMax:   Temp (24hrs), Av.8 °F (36.6 °C), Min:97.5 °F (36.4 °C), Max:98.1 °F (36.7 °C)    Respirations:  Respirations: 18  Pulse:   Pulse: 81  BP:    BP: (!) 154/86  BP Range: Systolic (24hrs), Av , Min:90 , Max:181       Diastolic (24hrs), Av, Min:52, Max:92      Physical Examination:     General:  AAO x 3, speaking in full sentences, no accessory muscle use.  HEENT: Atraumatic, normocephalic, no throat congestion, moist mucosa.  Eyes:   Pupils equal, round and reactive to light, EOMI.  Neck:   No JVD, no thyromegaly, no lymphadenopathy.  Chest:  Bilateral vesicular breath sounds, no rales or wheezes.  Cardiac:  S1 S2 RR, no murmurs, gallops or rubs, JVP not raised.  Abdomen: Soft, non-tender, no masses or organomegaly, BS audible.  :   No suprapubic or flank tenderness.  Neuro:  AAO x 3, No FND.  SKIN:  No rashes, good skin turgor.  Extremities:  No edema, palpable peripheral pulses, no calf tenderness.    Labs:       Recent Labs     25  0154 25  1021 01/10/25  0503   WBC 5.2 6.4 6.0   RBC 3.22* 3.65* 3.46*   HGB 10.1* 11.4* 10.7*   HCT 30.2* 34.5* 32.5*   MCV 93.8 94.5 93.9   MCH 31.4 31.2 30.9   MCHC 33.4 33.0 32.9   RDW 13.6 13.6 13.7    208 198   MPV 9.4 9.4 9.6      BMP:   Recent Labs     25  0154 01/10/25  0503    135*   K 4.2 4.5   CL 99 99   CO2 25 23   BUN 36* 21*   CREATININE 7.1* 5.7*   GLUCOSE 140* 145*

## 2025-01-10 NOTE — PROGRESS NOTES
[facility]  Office: [phone]  Tray Ornelas DO, Lloyd Gamez DO, Robbie Almanzar DO, Amaury Mares DO, Brissa Iglesias MD, Deirdre Cross MD, Quincy Oliva MD, Simran Marshall MD,  Dex Mckeon MD, Barrington Collins MD, Trevon Manuel MD,  Hellen Vides DO, Rhonda Alva MD, Mario Watson MD, Kermit Ornelas DO, Keiko Mackenzie MD,  Rojelio Roland DO, Annelise Richey MD, Claudia Glover MD, Leanna Ace MD, Tiara Carvalho MD,  Eugenio Null MD, Da Sun MD, Mayra Ridley MD, Rafita Sanders MD, Dakotah Calero MD, Eleanor López MD, Tre Moreland DO, Rufus Sky MD, Hellen Hanson MD, Mohsin Reza, MD, Shirley Waterhouse, CNP,  Allegra Al CNP, Tre Julien, ELISA,  Blessing High, RAZIA, Natasha Harris, CNP, Sherrill Pineda, CNP, Yessica Rubio, ELISA, Estrellita Valentin, CNP, Sonal Self, PA-C, Lesia Yoder PA-C, Dinora Lloyd, CNP, Cassie Sharpe, CNP,  Teresa Rangel, CNP, Nena London, CNP, Elissa Pagan, CNP,  Lyndsay Light, ELISA, Reyna Rose, CNP       [facility]      Daily Progress Note     Admit Date: 1/9/2025  Bed/Room No.  1022/1022-01  Admitting Physician : Quincy Oliva MD  Code Status :Full Code  Hospital Day:  LOS: 1 day   Chief Complaint:     Chief Complaint   Patient presents with    Facial Swelling     Started Tuesday.  Hx of blood clots. On blood thinner      Principal Problem:    Vein stenosis  Active Problems:    Type 2 diabetes mellitus with stage 3 chronic kidney disease, with long-term current use of insulin (HCC)    Essential hypertension    ESRD on hemodialysis (HCC)    Chronic anticoagulation    Complication of AV dialysis fistula, initial encounter    Subtherapeutic international normalized ratio (INR)    Facial swelling    History of type 2 diabetes mellitus  Resolved Problems:    * No resolved hospital problems. *    Subjective :        Interval History/Significant events :  01/10/25    Patient remains on heparin infusion.  INR  No murmur heard.  Pulmonary:      Effort: Pulmonary effort is normal.      Breath sounds: Normal breath sounds. No wheezing or rales.   Abdominal:      Palpations: Abdomen is soft. There is no mass.      Tenderness: There is no abdominal tenderness.   Musculoskeletal:      Cervical back: Full passive range of motion without pain and neck supple.      Comments: LUE AV fistula in place   Lymphadenopathy:      Head:      Right side of head: No submandibular adenopathy.      Left side of head: No submandibular adenopathy.      Cervical: No cervical adenopathy.   Skin:     General: Skin is warm.   Neurological:      Mental Status: He is alert and oriented to person, place, and time.      Motor: No tremor.   Psychiatric:         Behavior: Behavior is cooperative.           Laboratory findings:    Recent Labs     01/09/25  0154 01/09/25  1021 01/10/25  0503   WBC 5.2 6.4 6.0   HGB 10.1* 11.4* 10.7*   HCT 30.2* 34.5* 32.5*    208 198   INR 1.2 1.4 1.7     Recent Labs     01/09/25  0154 01/10/25  0503    135*   K 4.2 4.5   CL 99 99   CO2 25 23   GLUCOSE 140* 145*   BUN 36* 21*   CREATININE 7.1* 5.7*   MG 2.0  --    CALCIUM 8.8 8.4*     No results for input(s): \"LABALBU\", \"LABA1C\", \"J0VUDSE\", \"FT4\", \"TSH\", \"AST\", \"ALT\", \"LDH\", \"GGT\", \"ALKPHOS\", \"BILITOT\", \"BILIDIR\", \"AMMONIA\", \"AMYLASE\", \"LIPASE\", \"LACTATE\", \"CHOL\", \"HDL\", \"CHOLHDLRATIO\", \"TRIG\", \"VLDL\", \"BNP\", \"TROPONINI\", \"CKTOTAL\", \"CKMB\", \"CKMBINDEX\", \"RF\", \"YEIMI\" in the last 72 hours.    Invalid input(s): \"PROT\", \"L6LXCRN\", \"LDLCHOLESTEROL\"       Protein, UA   Date Value Ref Range Status   03/01/2020 2+ (A) NEGATIVE Final     RBC, UA   Date Value Ref Range Status   03/01/2020 0 TO 2 0 - 2 /HPF Final     Bacteria, UA   Date Value Ref Range Status   03/01/2020 NOT REPORTED None Final     Nitrite, Urine   Date Value Ref Range Status   03/01/2020 NEGATIVE NEGATIVE Final     WBC, UA   Date Value Ref Range Status   03/01/2020 5 TO 10 0 - 5 /HPF Final     Leukocyte

## 2025-01-10 NOTE — PLAN OF CARE
Problem: Chronic Conditions and Co-morbidities  Goal: Patient's chronic conditions and co-morbidity symptoms are monitored and maintained or improved  Outcome: Progressing  Flowsheets (Taken 1/9/2025 2000)  Care Plan - Patient's Chronic Conditions and Co-Morbidity Symptoms are Monitored and Maintained or Improved:   Monitor and assess patient's chronic conditions and comorbid symptoms for stability, deterioration, or improvement   Collaborate with multidisciplinary team to address chronic and comorbid conditions and prevent exacerbation or deterioration   Update acute care plan with appropriate goals if chronic or comorbid symptoms are exacerbated and prevent overall improvement and discharge     Problem: Discharge Planning  Goal: Discharge to home or other facility with appropriate resources  Outcome: Progressing  Flowsheets (Taken 1/9/2025 2000)  Discharge to home or other facility with appropriate resources:   Identify barriers to discharge with patient and caregiver   Arrange for needed discharge resources and transportation as appropriate   Identify discharge learning needs (meds, wound care, etc)     Problem: ABCDS Injury Assessment  Goal: Absence of physical injury  Outcome: Progressing     Problem: Pain  Goal: Verbalizes/displays adequate comfort level or baseline comfort level  Outcome: Progressing

## 2025-01-10 NOTE — PROGRESS NOTES
Occupational Therapy  DATE: 1/10/2025    NAME: Hayes Brock  MRN: [id number]   : 1965    Patient not seen this date for Occupational Therapy due to:      [] Cancel by RN or physician due to:    [] Hemodialysis    [] Critical Lab Value Level     [] Blood transfusion in progress    [] Acute or unstable cardiovascular status   _MAP < 55 or more than >115  _HR < 40 or > 130    [] Acute or unstable pulmonary status   -FiO2 > 60%   _RR < 5 or >40    _O2 sats < 85%    [] Strict Bedrest    [] Off Unit for surgery or procedure    [] Off Unit for testing       [] Pending imaging to R/O fracture    [] Refusal by Patient      [] Other      [x] OT being discontinued at this time. Patient independent, observed pt up functionally in room and reaching down to floor level with Good balance. No further acute care OT needs.     [] OT being discontinued at this time as the patient has been transferred to hospice care. No further needs.      Nery Rdz, OT

## 2025-01-11 VITALS
HEART RATE: 78 BPM | OXYGEN SATURATION: 97 % | SYSTOLIC BLOOD PRESSURE: 169 MMHG | HEIGHT: 69 IN | DIASTOLIC BLOOD PRESSURE: 87 MMHG | TEMPERATURE: 98 F | WEIGHT: 216.05 LBS | BODY MASS INDEX: 32 KG/M2 | RESPIRATION RATE: 16 BRPM

## 2025-01-11 LAB
ANION GAP SERPL CALCULATED.3IONS-SCNC: 14 MMOL/L (ref 9–16)
ANTI-XA UNFRAC HEPARIN: 0.31 IU/L (ref 0.3–0.7)
ANTI-XA UNFRAC HEPARIN: 0.36 IU/L (ref 0.3–0.7)
BUN SERPL-MCNC: 40 MG/DL (ref 6–20)
CALCIUM SERPL-MCNC: 7.9 MG/DL (ref 8.6–10.4)
CHLORIDE SERPL-SCNC: 101 MMOL/L (ref 98–107)
CO2 SERPL-SCNC: 21 MMOL/L (ref 20–31)
CREAT SERPL-MCNC: 8.6 MG/DL (ref 0.7–1.2)
ERYTHROCYTE [DISTWIDTH] IN BLOOD BY AUTOMATED COUNT: 13.8 % (ref 11.8–14.4)
GFR, ESTIMATED: 7 ML/MIN/1.73M2
GLUCOSE BLD-MCNC: 130 MG/DL (ref 75–110)
GLUCOSE BLD-MCNC: 139 MG/DL (ref 75–110)
GLUCOSE BLD-MCNC: 99 MG/DL (ref 75–110)
GLUCOSE SERPL-MCNC: 142 MG/DL (ref 74–99)
HCT VFR BLD AUTO: 31.4 % (ref 40.7–50.3)
HGB BLD-MCNC: 10.4 G/DL (ref 13–17)
INR PPP: 2
MCH RBC QN AUTO: 31.4 PG (ref 25.2–33.5)
MCHC RBC AUTO-ENTMCNC: 33.1 G/DL (ref 28.4–34.8)
MCV RBC AUTO: 94.9 FL (ref 82.6–102.9)
NRBC BLD-RTO: 0 PER 100 WBC
PLATELET # BLD AUTO: 186 K/UL (ref 138–453)
PMV BLD AUTO: 9.5 FL (ref 8.1–13.5)
POTASSIUM SERPL-SCNC: 4.9 MMOL/L (ref 3.7–5.3)
PROTHROMBIN TIME: 22.4 SEC (ref 11.5–14.2)
RBC # BLD AUTO: 3.31 M/UL (ref 4.21–5.77)
SODIUM SERPL-SCNC: 136 MMOL/L (ref 136–145)
WBC OTHER # BLD: 5.2 K/UL (ref 3.5–11.3)

## 2025-01-11 PROCEDURE — 85027 COMPLETE CBC AUTOMATED: CPT

## 2025-01-11 PROCEDURE — 85610 PROTHROMBIN TIME: CPT

## 2025-01-11 PROCEDURE — 36415 COLL VENOUS BLD VENIPUNCTURE: CPT

## 2025-01-11 PROCEDURE — 82947 ASSAY GLUCOSE BLOOD QUANT: CPT

## 2025-01-11 PROCEDURE — 6370000000 HC RX 637 (ALT 250 FOR IP): Performed by: FAMILY MEDICINE

## 2025-01-11 PROCEDURE — 85520 HEPARIN ASSAY: CPT

## 2025-01-11 PROCEDURE — 90935 HEMODIALYSIS ONE EVALUATION: CPT

## 2025-01-11 PROCEDURE — 80048 BASIC METABOLIC PNL TOTAL CA: CPT

## 2025-01-11 RX ORDER — WARFARIN SODIUM 5 MG/1
5-7.5 TABLET ORAL SEE ADMIN INSTRUCTIONS
Qty: 30 TABLET | Refills: 3 | Status: SHIPPED | OUTPATIENT
Start: 2025-01-11

## 2025-01-11 RX ORDER — WARFARIN SODIUM 7.5 MG/1
7.5 TABLET ORAL
Status: COMPLETED | OUTPATIENT
Start: 2025-01-11 | End: 2025-01-11

## 2025-01-11 RX ORDER — HYDRALAZINE HYDROCHLORIDE 50 MG/1
100 TABLET, FILM COATED ORAL 3 TIMES DAILY
Qty: 90 TABLET | Refills: 1 | Status: SHIPPED | OUTPATIENT
Start: 2025-01-11

## 2025-01-11 RX ADMIN — INSULIN GLARGINE 20 UNITS: 100 INJECTION, SOLUTION SUBCUTANEOUS at 09:25

## 2025-01-11 RX ADMIN — NIFEDIPINE 60 MG: 30 TABLET, FILM COATED, EXTENDED RELEASE ORAL at 12:27

## 2025-01-11 RX ADMIN — WARFARIN SODIUM 7.5 MG: 7.5 TABLET ORAL at 17:39

## 2025-01-11 RX ADMIN — HYDRALAZINE HYDROCHLORIDE 100 MG: 50 TABLET ORAL at 15:02

## 2025-01-11 RX ADMIN — FOLIC ACID 1 MG: 1 TABLET ORAL at 09:31

## 2025-01-11 RX ADMIN — PANTOPRAZOLE SODIUM 40 MG: 40 TABLET, DELAYED RELEASE ORAL at 09:31

## 2025-01-11 RX ADMIN — HYDRALAZINE HYDROCHLORIDE 100 MG: 50 TABLET ORAL at 09:31

## 2025-01-11 ASSESSMENT — ENCOUNTER SYMPTOMS
SINUS PRESSURE: 0
VOMITING: 0
CHOKING: 0
SHORTNESS OF BREATH: 0
ABDOMINAL PAIN: 0
NAUSEA: 0
COUGH: 0
RHINORRHEA: 0
CHEST TIGHTNESS: 0
DIARRHEA: 0
VOICE CHANGE: 0
BACK PAIN: 0
CONSTIPATION: 0
WHEEZING: 0

## 2025-01-11 NOTE — PROGRESS NOTES
HEMODIALYSIS POST TREATMENT NOTE    Treatment time ordered: 3.5hours    Actual treatment time: 3.5hours    UltraFiltration Goal: 2.0kgs (Patient wanted to remove 2.0kgs he was at dry weight)  UltraFiltration Removed: 2.0kgs      Pre Treatment weight: 100.0kgs  Post Treatment weight: 98.0kgs  Estimated Dry Weight: 100.0kgs    Access used:     Central Venous Catheter:          Tunneled or Non-tunneled: N/A           Site: N/A          Access Flow: N/A      Internal Access:       AV Fistula or AV Graft: AVF         Site: Left Lower Arm       Access Flow: Good       Sign and symptoms of infection: No       If YES: N/A    Medications or blood products given: See MAR    Chronic outpatient schedule: MICHELLE    Chronic outpatient unit: Froylan Stokes    Summary of response to treatment: Patient tolerated treatment good.  2.0kgs of fluid removed without difficulty.  Needles pulled and sites held by patient for 5 min per site.  Bruit and thrill are still present.    Explain if orders NOT met, was physician notified:N/A      ACES flowsheet faxed to patient unit/ placed in patient chart: Yes    Post assessment completed: Kristy Coy    Report given to: Brittani SALCIDO      * Intra-treatment documented Safety Checks include the followin) Access and face visible at all times.     2) All connections and blood lines are secure with no kinks.     3) NVL alarm engaged.     4) Hemosafe device applied (if applicable).     5) No collapse of Arterial or Venous blood chambers.     6) All blood lines / pump segments in the air detectors.

## 2025-01-11 NOTE — PROGRESS NOTES
Warfarin Dosing - Pharmacy Consult Note  Consulting Provider: Quincy Oliva MD  Indication: SVC Syndrome   Warfarin Dose prior to admission: 5mg on Mondays, 7.5mg all other days    Concurrent anticoagulants/antiplatelets: heparin drip (until INR therapeutic)  Significant Drug Interactions: No obvious interactions    Recent Labs     01/09/25  1021 01/10/25  0503 01/11/25  0601   INR 1.4 1.7 2.0   HGB 11.4* 10.7* 10.4*    198 186      Date   INR    Dose  1/9        1.4        7.5 mg  1/10      1.7        7.5 mg    Assessment/Plan  (Goal INR: 2 - 3)  INR 2.0 today, on low end of range. Per notes heparin drip will continue for now. If INR continues to rise tomorrow I will recommend d/c heparin. Warfarin 7.5mg tonight and INR in AM    Warfarin 7.5 mg today. INR in the morning.    Active problem list reviewed.  INR orders are placed.  Chart reviewed for pertinent labs, drug/diet interactions, and past doses.  Documentation of patient's clinical condition was reviewed.    Pharmacy Dosing:  Pharmacy will continue to follow.

## 2025-01-11 NOTE — PROGRESS NOTES
IV and telemetry removed, discharge teaching/instructions reviewed with patient using AVS. Prescription pick-up information provided, patient voiced understanding regarding prescriptions, resuming home medications, follow up appointments and care of self at home. Pt transported self to hospital, accompanied to emergency room exit where vehicle was parked with personal belongings and discharged home.

## 2025-01-11 NOTE — PROGRESS NOTES
HEMODIALYSIS PRE-TREATMENT NOTE    Patient Identifiers prior to treatment: Name  MRN    Isolation Required: No                      Isolation Type: N/A       (please document if patient is being managed as a PUI/COVID-19 patient)        Hepatitis status:                           Date Drawn                             Result  Hepatitis B Surface Antigen 2024     NEG                     Hepatitis B Surface Antibody 2024 POS     96   Hepatitis B Core Antibody N/A N/A          How was Hepatitis Status verified: Epic &  Renal Chart     Was a copy of the labs you documented provided to facility for the patient's chart: Yes    Hemodialysis orders verified: Yes by Kristy Coy    Access Within normal limits ( I.e. s/s of infection,...): WNL     Pre-Assessment completed: Yes by Kristy Coy    Pre-dialysis report received from: Brittani SALCIDO                      Time: 830

## 2025-01-11 NOTE — PROGRESS NOTES
Renal Progress Note    Patient :  Hayes Brock; 59 y.o. MRN# [id number]  Location:  1022/1022-01  Attending:  Quincy Oliva MD  Admit Date:  1/9/2025   Hospital Day: 2      Subjective:     Patient was seen and evaluated inside exam room.  No acute events overnight.  IV heparin continues.  Patient's INR was up to 1.7 yesterday.  2.0 today.  Awaiting therapeutic INR, between 2 and 3.  Had hemodialysis last on Thursday 3 L removed.  Left AV fistula works well.  Vascular evaluation noted.  No surgical intervention needed.  He does have a known history of SVC syndrome according to the records and previously has had angioplasty at [facility].  Labs today showed a sodium of 136 potassium 4.9 chloride 101 bicarb 21 BUN 40 creatinine 8.6 7 calcium 7.9 hemoglobin 10.4 white count 5.2 platelets 186    History reviewed  Known history of ESRD on hemodialysis Tuesday Thursday Saturday at the  renal [facility] via left AV fistula under Dr. Love.  Known history of SVC syndrome is on chronic anticoagulation.  Came into the hospital with worsening facial swelling.  INR was subtherapeutic.  Imaging confirmed evidence of SVC syndrome.  Stenosis at the level of right subclavian and SVC junction.  Anticoagulation started.  Symptoms improving.  Had hemodialysis last on Thursday.    Outpatient Medications:     Medications Prior to Admission: NIFEdipine (PROCARDIA) 10 MG immediate release capsule, Take 3 capsules by mouth daily  prochlorperazine (COMPAZINE) 10 MG tablet, Take 1 tablet by mouth 2 times daily as needed  cinacalcet (SENSIPAR) 30 MG tablet, Take 2 tablets by mouth daily  aluminum & magnesium hydroxide-simethicone (MYLANTA) 400-400-40 MG/5ML SUSP, Take 15 mLs by mouth every 6 hours as needed for Indigestion  Magnesium Oxide 420 MG TABS, Take 1 tablet by mouth 2 times daily  sevelamer hcl (RENAGEL) 800 MG tablet, Take 1 tablet by mouth 3 times daily (with meals)  warfarin (COUMADIN) 5 MG tablet, Take 1-1.5

## 2025-01-11 NOTE — PROGRESS NOTES
Pt remained calm and cooperative tonight. Pt remained on RA. Pt had two therapeutic anti-xa results, next draw at 0600 and order changed to daily draws. Heparin gtt continues running at 17 units. Standard safety precautions in place. Will continue with care plan.

## 2025-01-11 NOTE — PLAN OF CARE
Problem: Chronic Conditions and Co-morbidities  Goal: Patient's chronic conditions and co-morbidity symptoms are monitored and maintained or improved  Outcome: Progressing  Flowsheets (Taken 1/10/2025 2000)  Care Plan - Patient's Chronic Conditions and Co-Morbidity Symptoms are Monitored and Maintained or Improved:   Monitor and assess patient's chronic conditions and comorbid symptoms for stability, deterioration, or improvement   Collaborate with multidisciplinary team to address chronic and comorbid conditions and prevent exacerbation or deterioration   Update acute care plan with appropriate goals if chronic or comorbid symptoms are exacerbated and prevent overall improvement and discharge     Problem: Discharge Planning  Goal: Discharge to home or other facility with appropriate resources  Outcome: Progressing  Flowsheets (Taken 1/10/2025 2000)  Discharge to home or other facility with appropriate resources:   Identify barriers to discharge with patient and caregiver   Arrange for needed discharge resources and transportation as appropriate   Identify discharge learning needs (meds, wound care, etc)     Problem: ABCDS Injury Assessment  Goal: Absence of physical injury  Outcome: Progressing     Problem: Pain  Goal: Verbalizes/displays adequate comfort level or baseline comfort level  Outcome: Progressing     Problem: Safety - Adult  Goal: Free from fall injury  Outcome: Progressing

## 2025-01-11 NOTE — PROGRESS NOTES
1529 (!) 144/74 98.1 °F (36.7 °C) -- 81 12 97 % --   01/10/25 1130 (!) 154/86 97.7 °F (36.5 °C) Oral 81 18 100 % --     Intake / output   01/09 1901 - 01/11 0700  In: 879.2 [P.O.:240; I.V.:639.2]  Out: -   Physical Exam:  Physical Exam  Vitals and nursing note reviewed.   Constitutional:       General: He is not in acute distress.     Appearance: He is not diaphoretic.   HENT:      Head: Normocephalic and atraumatic.      Nose:      Right Sinus: No maxillary sinus tenderness or frontal sinus tenderness.      Left Sinus: No maxillary sinus tenderness or frontal sinus tenderness.      Mouth/Throat:      Pharynx: No oropharyngeal exudate.   Eyes:      General: No scleral icterus.     Conjunctiva/sclera: Conjunctivae normal.      Pupils: Pupils are equal, round, and reactive to light.   Neck:      Thyroid: No thyromegaly.      Vascular: No JVD.   Cardiovascular:      Rate and Rhythm: Normal rate and regular rhythm.      Pulses:           Dorsalis pedis pulses are 2+ on the right side and 2+ on the left side.      Heart sounds: Normal heart sounds. No murmur heard.  Pulmonary:      Effort: Pulmonary effort is normal.      Breath sounds: Normal breath sounds. No wheezing or rales.   Abdominal:      Palpations: Abdomen is soft. There is no mass.      Tenderness: There is no abdominal tenderness.   Musculoskeletal:      Cervical back: Full passive range of motion without pain and neck supple.      Comments: LUE AV fistula in place   Lymphadenopathy:      Head:      Right side of head: No submandibular adenopathy.      Left side of head: No submandibular adenopathy.      Cervical: No cervical adenopathy.   Skin:     General: Skin is warm.   Neurological:      Mental Status: He is alert and oriented to person, place, and time.      Motor: No tremor.   Psychiatric:         Behavior: Behavior is cooperative.           Laboratory findings:    Recent Labs     01/09/25  1021 01/10/25  0503 01/11/25  0601   WBC 6.4 6.0 5.2   HGB  11.4* 10.7* 10.4*   HCT 34.5* 32.5* 31.4*    198 186   INR 1.4 1.7 2.0     Recent Labs     01/09/25  0154 01/10/25  0503    135*   K 4.2 4.5   CL 99 99   CO2 25 23   GLUCOSE 140* 145*   BUN 36* 21*   CREATININE 7.1* 5.7*   MG 2.0  --    CALCIUM 8.8 8.4*     No results for input(s): \"LABALBU\", \"LABA1C\", \"B8XQAMB\", \"FT4\", \"TSH\", \"AST\", \"ALT\", \"LDH\", \"GGT\", \"ALKPHOS\", \"BILITOT\", \"BILIDIR\", \"AMMONIA\", \"AMYLASE\", \"LIPASE\", \"LACTATE\", \"CHOL\", \"HDL\", \"CHOLHDLRATIO\", \"TRIG\", \"VLDL\", \"BNP\", \"TROPONINI\", \"CKTOTAL\", \"CKMB\", \"CKMBINDEX\", \"RF\", \"YEIMI\" in the last 72 hours.    Invalid input(s): \"PROT\", \"Y3AXIRA\", \"LDLCHOLESTEROL\"       Protein, UA   Date Value Ref Range Status   03/01/2020 2+ (A) NEGATIVE Final     RBC, UA   Date Value Ref Range Status   03/01/2020 0 TO 2 0 - 2 /HPF Final     Bacteria, UA   Date Value Ref Range Status   03/01/2020 NOT REPORTED None Final     Nitrite, Urine   Date Value Ref Range Status   03/01/2020 NEGATIVE NEGATIVE Final     WBC, UA   Date Value Ref Range Status   03/01/2020 5 TO 10 0 - 5 /HPF Final     Leukocyte Esterase, Urine   Date Value Ref Range Status   03/01/2020 NEGATIVE NEGATIVE Final       Imaging / Clinical Data :-   IR DIALYSIS FISTULAGRAM EVAL   Final Result   Left radiocephalic fistula demonstrates 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      Attempted Ree cannulation of the right brachiocephalic vein.         CTA CHEST W CONTRAST   Final Result   1. Central venous stenosis is demonstrated at the level of the distal right   subclavian vein/SVC junction with collateral flow in the neck.  The   downstream SVC is otherwise patent.   2. No acute airspace disease identified.   3. Stable subcentimeter pulmonary nodules for which no dedicated follow-up is   necessary.              Clinical Course

## 2025-01-11 NOTE — PROGRESS NOTES
VASCULAR SURGERY  PROGRESS NOTE      1/11/2025 5:39 PM  Subjective:   Admit Date: 1/9/2025  PCP: Misael Gibson MD    Chief Complaint   Patient presents with    Facial Swelling     Started Tuesday.  Hx of blood clots. On blood thinner      Interval History: No complaints.  INR 2.0 today.    Diet: ADULT DIET; Regular    Medications:   Scheduled Meds:   warfarin  7.5 mg Oral Once    sodium chloride flush  5-40 mL IntraVENous 2 times per day    cinacalcet  60 mg Oral Daily    folic acid  1 mg Oral Daily    insulin glargine  20 Units SubCUTAneous BID    NIFEdipine  60 mg Oral Daily    pantoprazole  40 mg Oral BID    rosuvastatin  5 mg Oral Daily    topiramate  100 mg Oral BID    traZODone  50 mg Oral Nightly    hydrALAZINE  100 mg Oral TID    warfarin placeholder: dosing by pharmacy   Oral RX Placeholder     Continuous Infusions:   sodium chloride 25 mL (01/09/25 0852)    dextrose           Labs:   CBC:   Recent Labs     01/09/25  1021 01/10/25  0503 01/11/25  0601   WBC 6.4 6.0 5.2   HGB 11.4* 10.7* 10.4*    198 186     BMP:    Recent Labs     01/09/25  0154 01/10/25  0503 01/11/25  0923    135* 136   K 4.2 4.5 4.9   CL 99 99 101   CO2 25 23 21   BUN 36* 21* 40*   CREATININE 7.1* 5.7* 8.6*   GLUCOSE 140* 145* 142*     Hepatic: No results for input(s): \"AST\", \"ALT\", \"BILITOT\", \"ALKPHOS\" in the last 72 hours.    Invalid input(s): \"ALB\"  Troponin: Invalid input(s): \"TROPONIN\"  BNP: No results for input(s): \"BNP\" in the last 72 hours.  Lipids: No results for input(s): \"CHOL\", \"HDL\" in the last 72 hours.    Invalid input(s): \"LDLCALCU\"  INR:   Recent Labs     01/09/25  1021 01/10/25  0503 01/11/25  0601   INR 1.4 1.7 2.0       Objective:   Vitals: BP (!) 169/87   Pulse 78   Temp 98 °F (36.7 °C)   Resp 16   Ht 1.753 m (5' 9\")   Wt 98 kg (216 lb 0.8 oz)   SpO2 97%   BMI 31.91 kg/m²   General appearance: alert, cooperative and no distress  Mental Status: oriented to person, place and time with

## 2025-01-12 NOTE — DISCHARGE SUMMARY
Essential hypertension [I10]     Type 2 diabetes mellitus with stage 3 chronic kidney disease, with long-term current use of insulin (HCC) [E11.22, N18.30, Z79.4]        Admission Condition:  fair     Discharged Condition: stable    Hospital Stay:     Hospital Course:  Hayes Brock is a 59 y.o. male who was admitted for the management of   Vein stenosis , presented to ER with Facial Swelling (Started Tuesday.  Hx of blood clots. On blood thinner )  Patient presented to emergency room with facial swelling.  Swelling was present for 3 days.  Patient reported that he had prior history of similar symptoms when he had stenosis of subclavian vein and required thrombectomy in October 2024.  He has known history of ESRD on hemodialysis and has left forearm AV fistula in place.  Patient denied missing any dialysis.  He is on long-term anticoagulation with warfarin.  INR was subtherapeutic at presentation.  Patient usually takes 7.5 mg daily except Mondays when he takes 5 mg.  He reports compliance to the medication.  Patient has history of diabetes mellitus, hypertension.  He denies smoking, alcohol, drug use.  Patient lives with his wife at home.  If follows Dr. Becerra for dialysis at  [facility].   CTA chest with contrast was obtained and showed central venous stenosis and distal subclavian vein, SVC junction with collateral flow and neck.  Patient was started on heparin infusion.  IR attempted thrombectomy however was unable to cross.  Vascular surgery was consulted and recommended nonsurgical management and continue with anticoagulation.  Patient was subtherapeutic on admission.  He was started on heparin infusion until INR was elevated more than 2.0.  He was discharged home in stable condition and advised to follow-up with [facility] [place] closely.  Patient was also recommended to stay consistent with the diet to avoid frequent changes in anticoagulation.  He was recommended

## (undated) DEVICE — SUTURE MONOCRYL SZ 4-0 L27IN ABSRB UD L19MM PS-2 1/2 CIR PRIM Y426H

## (undated) DEVICE — GEL US 20GM NONIRRITATING OVERWRAPPED FILE PCH TRNSMIT

## (undated) DEVICE — ELECTRODE ES L3IN S STL BLDE INSUL DISP VALLEYLAB EDGE

## (undated) DEVICE — DRAPE,HAND,STERILE: Brand: MEDLINE

## (undated) DEVICE — 3F 80 CM NOVASIL SILICONE SINGLE LUMEN EMBOLECTOMY CATHETER, EIFU: Brand: LEMAITRE EMBOLECTOMY CATHETER

## (undated) DEVICE — SUTURE VICRYL + SZ 3-0 L27IN ABSRB UD L26MM SH 1/2 CIR VCP416H

## (undated) DEVICE — SUTURE NONABSORBABLE MONOFILAMENT 6-0 BV-1 1X30 IN PROLENE 8709H

## (undated) DEVICE — IMMOBILIZER ORTH CONTACT CLOSURE STRP LG 18X9 IN PROCARE

## (undated) DEVICE — DRESSING TRNSPAR W5XL4.5IN FLM SHT SEMIPERMEABLE WIND

## (undated) DEVICE — NEEDLE HYPO 25GA L1.5IN BLU POLYPR HUB S STL REG BVL STR

## (undated) DEVICE — LOOP VES W13MM THK09MM MINI RED SIL FLD REPELLENT

## (undated) DEVICE — GLOVE SURG SZ 8 L11.77IN FNGR THK9.8MIL STRW LTX POLYMER

## (undated) DEVICE — SUTURE VICRYL SZ 3-0 L54IN ABSRB UD LIGAPAK REEL POLYGLACTIN J285G

## (undated) DEVICE — 4F 40 CM NOVASIL SILICONE SINGLE LUMEN EMBOLECTOMY CATHETER, EIFU: Brand: LEMAITRE EMBOLECTOMY CATHETER

## (undated) DEVICE — SYRINGE MED 10ML TRNSLUC BRL PLUNG BLK MRK POLYPR CTRL

## (undated) DEVICE — Device

## (undated) DEVICE — BLANKET WRM W40.2XL55.9IN IORT LO BODY + MISTRAL AIR